# Patient Record
Sex: MALE | Race: WHITE | NOT HISPANIC OR LATINO | Employment: OTHER | URBAN - NONMETROPOLITAN AREA
[De-identification: names, ages, dates, MRNs, and addresses within clinical notes are randomized per-mention and may not be internally consistent; named-entity substitution may affect disease eponyms.]

---

## 2017-01-25 ENCOUNTER — OFFICE VISIT (OUTPATIENT)
Dept: NEUROSURGERY | Facility: CLINIC | Age: 72
End: 2017-01-25

## 2017-01-25 VITALS
WEIGHT: 162 LBS | BODY MASS INDEX: 25.43 KG/M2 | DIASTOLIC BLOOD PRESSURE: 78 MMHG | HEIGHT: 67 IN | SYSTOLIC BLOOD PRESSURE: 130 MMHG

## 2017-01-25 DIAGNOSIS — G89.4 CHRONIC PAIN SYNDROME: Primary | ICD-10-CM

## 2017-01-25 DIAGNOSIS — F17.200 SMOKER: ICD-10-CM

## 2017-01-25 PROBLEM — S22.009A THORACIC SPINE FRACTURE (HCC): Chronic | Status: ACTIVE | Noted: 2017-01-25

## 2017-01-25 PROCEDURE — 99212 OFFICE O/P EST SF 10 MIN: CPT | Performed by: NEUROLOGICAL SURGERY

## 2017-01-25 RX ORDER — AZELASTINE 1 MG/ML
1 SPRAY, METERED NASAL DAILY
COMMUNITY
End: 2022-01-01 | Stop reason: HOSPADM

## 2017-01-25 RX ORDER — TRAMADOL HYDROCHLORIDE 50 MG/1
50 TABLET ORAL 3 TIMES DAILY PRN
COMMUNITY
End: 2017-01-25 | Stop reason: SDUPTHER

## 2017-01-25 RX ORDER — TRAMADOL HYDROCHLORIDE 50 MG/1
50 TABLET ORAL 3 TIMES DAILY PRN
Qty: 90 TABLET | Refills: 0 | Status: SHIPPED | OUTPATIENT
Start: 2017-01-25

## 2017-01-25 RX ORDER — MONTELUKAST SODIUM 10 MG/1
10 TABLET ORAL NIGHTLY
COMMUNITY

## 2017-01-25 RX ORDER — OMEPRAZOLE 20 MG/1
20 CAPSULE, DELAYED RELEASE ORAL 2 TIMES DAILY
COMMUNITY

## 2017-01-25 RX ORDER — METAXALONE 800 MG/1
800 TABLET ORAL 2 TIMES DAILY PRN
Qty: 60 TABLET | Refills: 0 | Status: SHIPPED | OUTPATIENT
Start: 2017-01-25

## 2017-01-25 RX ORDER — METAXALONE 800 MG/1
800 TABLET ORAL 2 TIMES DAILY PRN
COMMUNITY
End: 2017-01-25 | Stop reason: SDUPTHER

## 2017-01-25 RX ORDER — SIMVASTATIN 40 MG
40 TABLET ORAL NIGHTLY
Status: ON HOLD | COMMUNITY
End: 2021-01-01

## 2017-01-25 RX ORDER — TRIAMCINOLONE ACETONIDE 0.1 %
PASTE (GRAM) DENTAL
Refills: 0 | Status: ON HOLD | COMMUNITY
Start: 2016-10-20 | End: 2021-01-01

## 2017-01-25 RX ORDER — NITROGLYCERIN 0.4 MG/1
TABLET SUBLINGUAL
Refills: 5 | COMMUNITY
Start: 2016-12-14

## 2017-01-25 RX ORDER — CILOSTAZOL 100 MG/1
100 TABLET ORAL 2 TIMES DAILY
Refills: 1 | COMMUNITY
Start: 2016-11-07 | End: 2022-01-01 | Stop reason: HOSPADM

## 2017-01-25 RX ORDER — METOPROLOL SUCCINATE 25 MG/1
25 TABLET, EXTENDED RELEASE ORAL DAILY
Status: ON HOLD | COMMUNITY
End: 2021-01-01

## 2017-01-25 RX ORDER — FLUOROURACIL 50 MG/G
CREAM TOPICAL
Refills: 0 | Status: ON HOLD | COMMUNITY
Start: 2017-01-19 | End: 2021-01-01

## 2017-01-25 NOTE — LETTER
January 25, 2017     KARLA Bell  [address] [place] [address]    Patient: Chucky Jamison   YOB: 1945   Date of Visit: 1/25/2017       Dear KARLA Dill:    Chucky Jamison was in my office today. Below is a copy of my note.    If you have questions, please do not hesitate to call me. I look forward to following Chucky along with you.         Sincerely,        Baldev Phipps MD        CC: Jaswinder Curry III, MD    Patient ID: Chucky Jamison is a 71 y.o. 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  He has had chronic pain.  He had a thoracic fracture.  He states he has reached a settlement.  He has continued chronic pain.  He intermittently takes Ultram and Robaxin.  He has had no new injury.  He gets some numbness in his feet which is vascular in nature.  He has had a lesion in his left long followed by his physician in [facility].  Probably calcification.  An attempted ability to bend.  Standard sit long periods of time due to the interscapular back pain related to old thoracic fracture    The following portions of the patient's history were reviewed and updated as appropriate: allergies, current medications, past family history, past medical history, past social history, past surgical history and problem list.    Review of Systems   Constitutional: Negative.    HENT: Negative.    Eyes: Negative.    Respiratory: Negative.         C OPD, uses inhalers   Cardiovascular: Negative.         Coronary artery disease with previous stents   Gastrointestinal: Negative.    Endocrine: Negative.    Genitourinary: Negative.    Musculoskeletal: Positive for arthralgias and back pain.   Skin: Negative.    Allergic/Immunologic: Negative.    Neurological: Positive for numbness.   Hematological: Negative.    Psychiatric/Behavioral: Negative.    All other systems  reviewed and are negative.      No past medical history on file.    No family history on file.    Social History   Substance Use Topics   • Smoking status: Current Some Day Smoker     Packs/day: 0.50   • Smokeless tobacco: None      Comment: Trying to cut back   • Alcohol use No       No past surgical history on file.    Azithromycin; Penicillins; and Shellfish-derived products      Physical Exam:  []  Awake alert oriented properly.  Speech normal gait normal with limitation forward flexion and with straightening of  thoracolumbar area.  Chest clear and heart regular without murmur or bruit.  Cranial nerves intact good equal strength upper and lower extremities.  Reflexes 1-2+ and equal.  Cerebellar function intact.  No specific sensory abnormality.    Review of Radiographic Studies:  [] No recent x-rays    Medical Decision Making:  []  He may be seen on an as necessary basis.  His primary care physician may monitor his chronic pain this point.    No diagnosis found. closed thoracic spine fracture,  chronic pain    No Follow-up on file.  prn

## 2017-01-25 NOTE — PROGRESS NOTES
Patient ID: Chucky Jamison is a 71 y.o. 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  He has had chronic pain.  He had a thoracic fracture.  He states he has reached a settlement.  He has continued chronic pain.  He intermittently takes Ultram and Robaxin.  He has had no new injury.  He gets some numbness in his feet which is vascular in nature.  He has had a lesion in his left long followed by his physician in [facility].  Probably calcification.  An attempted ability to bend.  Standard sit long periods of time due to the interscapular back pain related to old thoracic fracture    The following portions of the patient's history were reviewed and updated as appropriate: allergies, current medications, past family history, past medical history, past social history, past surgical history and problem list.    Review of Systems   Constitutional: Negative.    HENT: Negative.    Eyes: Negative.    Respiratory: Negative.         C OPD, uses inhalers   Cardiovascular: Negative.         Coronary artery disease with previous stents   Gastrointestinal: Negative.    Endocrine: Negative.    Genitourinary: Negative.    Musculoskeletal: Positive for arthralgias and back pain.   Skin: Negative.    Allergic/Immunologic: Negative.    Neurological: Positive for numbness.   Hematological: Negative.    Psychiatric/Behavioral: Negative.    All other systems reviewed and are negative.      No past medical history on file.    No family history on file.    Social History   Substance Use Topics   • Smoking status: Current Some Day Smoker     Packs/day: 0.50   • Smokeless tobacco: None      Comment: Trying to cut back   • Alcohol use No       No past surgical history on file.    Azithromycin; Penicillins; and Shellfish-derived products      Physical Exam:  []  Awake alert oriented properly.  Speech normal gait normal with  limitation forward flexion and with straightening of  thoracolumbar area.  Chest clear and heart regular without murmur or bruit.  Cranial nerves intact good equal strength upper and lower extremities.  Reflexes 1-2+ and equal.  Cerebellar function intact.  No specific sensory abnormality.    Review of Radiographic Studies:  [] No recent x-rays    Medical Decision Making:  []  He may be seen on an as necessary basis.  His primary care physician may monitor his chronic pain this point.    No diagnosis found. closed thoracic spine fracture,  chronic pain    No Follow-up on file.  prn

## 2017-01-25 NOTE — MR AVS SNAPSHOT
Chucky Jamison   1/25/2017 10:00 AM   Office Visit    Dept Phone:  [phone]   Encounter #:  [id number]    Provider:  Baldev Phipps MD   Department:  [facility] NEUROSURGERY                Your Full Care Plan              Where to Get Your Medications      You can get these medications from any pharmacy     Bring a paper prescription for each of these medications     metaxalone 800 MG tablet    traMADol 50 MG tablet            Your Updated Medication List          This list is accurate as of: 1/25/17 10:23 AM.  Always use your most recent med list.                aspirin 81 MG tablet       azelastine 0.1 % nasal spray   Commonly known as:  ASTELIN       BREO ELLIPTA 200-25 MCG/INH aerosol powder    Generic drug:  Fluticasone Furoate-Vilanterol       cilostazol 100 MG tablet   Commonly known as:  PLETAL       fluorouracil 5 % cream   Commonly known as:  EFUDEX       metaxalone 800 MG tablet   Commonly known as:  SKELAXIN   Take 1 tablet by mouth 2 (Two) Times a Day As Needed for muscle spasms.       metoprolol succinate XL 25 MG 24 hr tablet   Commonly known as:  TOPROL-XL       metoprolol tartrate 25 MG tablet   Commonly known as:  LOPRESSOR       montelukast 10 MG tablet   Commonly known as:  SINGULAIR       nitroglycerin 0.4 MG SL tablet   Commonly known as:  NITROSTAT       omeprazole 20 MG capsule   Commonly known as:  priLOSEC       PULMICORT FLEXHALER IN       simvastatin 40 MG tablet   Commonly known as:  ZOCOR       traMADol 50 MG tablet   Commonly known as:  ULTRAM   Take 1 tablet by mouth 3 (Three) Times a Day As Needed for moderate pain (4-6).       triamcinolone 0.1 % paste   Commonly known as:  KENALOG       Vitamin B12 1000 MCG tablet controlled-release       Vitamin D3 2000 UNITS capsule               You Were Diagnosed With        Codes Comments    Chronic pain syndrome    -  Primary ICD-10-CM: G89.4  ICD-9-CM: 338.4     Smoker     ICD-10-CM:  "F17.200  ICD-9-CM: 305.1       Instructions     None    Patient Instructions History      Upcoming Appointments     Visit Type Date Time Department    FOLLOW UP 1/25/2017 10:00 AM [facility]      MyChart Signup     Our records indicate that you have declined [facility] MyChart signup. If you would like to sign up for [facility], please email [email or web address] or call [phone] to obtain an activation code.             Other Info from Your Visit           Allergies     Azithromycin      abd pain.     Penicillins  Rash    And pain    Shellfish-derived Products  Itching, Rash      Reason for Visit     Back Pain Chronic      Vital Signs     Blood Pressure Height Weight Body Mass Index Smoking Status       130/78 67\" (170.2 cm) 162 lb (73.5 kg) 25.37 kg/m2 Current Some Day Smoker       Problems and Diagnoses Noted     Chronic pain syndrome    -  Primary    Smoker            "

## 2017-03-07 ENCOUNTER — TELEPHONE (OUTPATIENT)
Dept: NEUROSURGERY | Facility: CLINIC | Age: 72
End: 2017-03-07

## 2017-03-07 NOTE — TELEPHONE ENCOUNTER
Patient called requesting a letter for his W/C rep Price Benitez regarding release to PCP for medical care.  Mr. Jamison worked for [employer], this was a W/C claim. [employer].  Chucky called Mr. Benitez and was told they would need a letter to be relased to his PCP. Phone#[phone] Ext [phone] Fax # [phone].

## 2017-06-01 ENCOUNTER — OFFICE VISIT (OUTPATIENT)
Dept: CARDIOLOGY | Age: 72
End: 2017-06-01
Payer: COMMERCIAL

## 2017-06-01 VITALS
HEART RATE: 74 BPM | BODY MASS INDEX: 24.64 KG/M2 | WEIGHT: 157 LBS | HEIGHT: 67 IN | SYSTOLIC BLOOD PRESSURE: 108 MMHG | DIASTOLIC BLOOD PRESSURE: 72 MMHG

## 2017-06-01 DIAGNOSIS — Z72.0 TOBACCO ABUSE: ICD-10-CM

## 2017-06-01 DIAGNOSIS — I25.10 CORONARY ARTERY DISEASE INVOLVING NATIVE CORONARY ARTERY OF NATIVE HEART WITHOUT ANGINA PECTORIS: Primary | ICD-10-CM

## 2017-06-01 DIAGNOSIS — E78.2 MIXED HYPERLIPIDEMIA: ICD-10-CM

## 2017-06-01 DIAGNOSIS — I73.9 PVD (PERIPHERAL VASCULAR DISEASE) (HCC): ICD-10-CM

## 2017-06-01 PROCEDURE — 99406 BEHAV CHNG SMOKING 3-10 MIN: CPT | Performed by: CLINICAL NURSE SPECIALIST

## 2017-06-01 PROCEDURE — 99213 OFFICE O/P EST LOW 20 MIN: CPT | Performed by: CLINICAL NURSE SPECIALIST

## 2017-06-01 RX ORDER — ROSUVASTATIN CALCIUM 10 MG/1
10 TABLET, COATED ORAL DAILY
COMMUNITY
End: 2018-06-13 | Stop reason: ALTCHOICE

## 2017-06-01 ASSESSMENT — ENCOUNTER SYMPTOMS
ABDOMINAL PAIN: 0
NAUSEA: 0
COUGH: 0
ORTHOPNEA: 0
BLURRED VISION: 0
HEARTBURN: 0
VOMITING: 0
CHEST TIGHTNESS: 0
SHORTNESS OF BREATH: 1

## 2017-06-06 ENCOUNTER — TELEPHONE (OUTPATIENT)
Dept: NEUROSURGERY | Facility: CLINIC | Age: 72
End: 2017-06-06

## 2017-06-06 NOTE — TELEPHONE ENCOUNTER
Chucky called wanting to know if Dr. Phipps knew of someone he could be referred to.  His PCP does not take W/C.  Advised Dr. Phipps would be happy to refer him to anyone he would like. The doctors here does not see patient's long term if surgery is not needed.  Chucky will check with his union to see if they could recommend someone.

## 2017-12-01 ENCOUNTER — OFFICE VISIT (OUTPATIENT)
Dept: CARDIOLOGY | Age: 72
End: 2017-12-01
Payer: COMMERCIAL

## 2017-12-01 VITALS
DIASTOLIC BLOOD PRESSURE: 62 MMHG | BODY MASS INDEX: 24.64 KG/M2 | SYSTOLIC BLOOD PRESSURE: 112 MMHG | HEIGHT: 67 IN | WEIGHT: 157 LBS | HEART RATE: 68 BPM

## 2017-12-01 DIAGNOSIS — E78.2 MIXED HYPERLIPIDEMIA: ICD-10-CM

## 2017-12-01 DIAGNOSIS — F17.219 NICOTINE DEPENDENCE, CIGARETTES, W UNSP DISORDERS: ICD-10-CM

## 2017-12-01 DIAGNOSIS — I25.10 CORONARY ARTERY DISEASE INVOLVING NATIVE CORONARY ARTERY OF NATIVE HEART WITHOUT ANGINA PECTORIS: Primary | ICD-10-CM

## 2017-12-01 DIAGNOSIS — I73.9 PVD (PERIPHERAL VASCULAR DISEASE) (HCC): ICD-10-CM

## 2017-12-01 PROCEDURE — 99213 OFFICE O/P EST LOW 20 MIN: CPT | Performed by: CLINICAL NURSE SPECIALIST

## 2017-12-01 PROCEDURE — 93000 ELECTROCARDIOGRAM COMPLETE: CPT | Performed by: CLINICAL NURSE SPECIALIST

## 2017-12-01 PROCEDURE — 99406 BEHAV CHNG SMOKING 3-10 MIN: CPT | Performed by: CLINICAL NURSE SPECIALIST

## 2017-12-01 RX ORDER — NITROGLYCERIN 0.4 MG/1
0.4 TABLET SUBLINGUAL EVERY 5 MIN PRN
Qty: 25 TABLET | Refills: 5 | Status: SHIPPED | OUTPATIENT
Start: 2017-12-01 | End: 2021-12-02 | Stop reason: SDUPTHER

## 2017-12-01 ASSESSMENT — ENCOUNTER SYMPTOMS
NAUSEA: 0
HEARTBURN: 0
VOMITING: 0
BLURRED VISION: 0
ORTHOPNEA: 0
SHORTNESS OF BREATH: 1
COUGH: 0

## 2017-12-01 NOTE — PROGRESS NOTES
[facility], [address], [address] [address]  Phone: [phone]  Fax: [phone]    OFFICE VISIT:  12/1/2017    [address]: 1945    Reason For Visit:  Anais Rider is a 81 y.o. male who is here for 6 Month Follow-Up (pt states no cardiac symptoms at this time ) and Coronary Artery Disease    HPI   Patient is here for follow-up with a history of CAD. His PCI's were done at [address] [place] [address]. He is denying any cardiac symptoms such as chest pain, worsening dyspnea, orthopnea, PND, edema, or palpitations. He is followed by vascular surgery for PVD. He continues to smoke    Lorna Rodriguez NP, APRN is PCP.   Dilan Carrion has the following history as recorded in [facility]:    Patient Active Problem List    Diagnosis Date Noted    Nicotine dependence, cigarettes, w unsp disorders 12/01/2017    CAD (coronary artery disease)     Hyperlipidemia     P[facility]) [facility] [address]     Tobacco abuse      Past Medical History:   Diagnosis Date    CAD (coronary artery disease)     RUFINO to LCX 1/13    Hyperlipidemia     Nicotine dependence, cigarettes, w unsp disorders 12/1/2017    PVD (peripheral vascular disease) ([facility] [address]     Tobacco abuse      Past Surgical History:   Procedure Laterality Date    CARDIAC CATHETERIZATION  02/02/2013    EF 65%, patent stent to LCX, no new occlusive disease    CATARACT REMOVAL WITH IMPLANT Bilateral 2014    CORONARY ANGIOPLASTY  01/28/2013    RUFINO to LCX    SHOULDER SURGERY Left      Family History   Problem Relation Age of Onset    Hypertension Mother     Stroke Mother      Social History   Substance Use Topics    Smoking status: Current Every Day Smoker     Packs/day: 0.50    Smokeless tobacco: Never Used    Alcohol use No      Current Outpatient Prescriptions   Medication Sig Dispense Refill    nitroGLYCERIN (NITROSTAT) 0.4 MG SL tablet Place 1 tablet under the tongue every 5 minutes as needed for Chest pain As directed 25 tablet 5    metoprolol tartrate (LOPRESSOR) 25 MG tablet TAKE 1 TABLET BY MOUTH TWICE A  tablet 3    rosuvastatin (CRESTOR) 10 MG tablet Take 10 mg by mouth daily      omeprazole (PRILOSEC) 20 MG delayed release capsule Take 20 mg by mouth daily      Multiple Minerals-Vitamins (CALCIUM & VIT D3 BONE HEALTH PO) Take by mouth      Cholecalciferol (VITAMIN D3) 2000 UNITS CAPS Take by mouth      vitamin B-12 (CYANOCOBALAMIN) 1000 MCG tablet Take 1,000 mcg by mouth daily      aspirin 81 MG tablet Take 81 mg by mouth 2 times daily      folic acid (FOLVITE) 1 MG tablet Take 1 mg by mouth daily      Budesonide (PULMICORT FLEXHALER IN) Inhale into the lungs      Fluticasone Furoate-Vilanterol (BREO ELLIPTA) 200-25 MCG/INH AEPB Inhale into the lungs      azelastine HCl 0.15 % SOLN by Nasal route      cilostazol (PLETAL) 100 MG tablet Take 100 mg by mouth 2 times daily       No current facility-administered medications for this visit. Allergies: Zithromax [azithromycin]; Penicillins; and Shellfish-derived products    Review of Systems  Review of Systems   Constitutional: Negative for chills, fever and malaise/fatigue. HENT: Negative for nosebleeds. Eyes: Negative for blurred vision. Respiratory: Positive for shortness of breath. Negative for cough. Sputum production: over exertion. Cardiovascular: Negative for chest pain, palpitations, orthopnea, leg swelling and PND. Gastrointestinal: Negative for heartburn, nausea and vomiting. Musculoskeletal: Negative for falls and myalgias. Skin: Negative for rash. Neurological: Negative for dizziness, sensory change, speech change and focal weakness. Endo/Heme/Allergies: Does not bruise/bleed easily. Psychiatric/Behavioral: Negative for depression. The patient is not nervous/anxious.         Objective  Vital Signs - /62   Pulse 68   Ht 5' 7\" (1.702 m)   Wt 157 lb (71.2 kg)   BMI 24.59 kg/m²   Physical Exam Constitutional: He is oriented to person, place, and time. He appears well-developed and well-nourished. No distress. HENT:   Head: Normocephalic and atraumatic. Eyes: Pupils are equal, round, and reactive to light. Right eye exhibits no discharge. Left eye exhibits no discharge. Neck: No JVD present. No tracheal deviation present. Cardiovascular: Normal rate, regular rhythm, normal heart sounds and intact distal pulses. Exam reveals no gallop and no friction rub. No murmur heard. No carotid bruit   Pulmonary/Chest: Effort normal and breath sounds normal. No respiratory distress. He has no wheezes. He has no rales. Abdominal: Soft. There is no tenderness. Musculoskeletal: He exhibits no edema. Normal gait and station   Neurological: He is alert and oriented to person, place, and time. No cranial nerve deficit. Skin: Skin is warm and dry. No rash noted. Psychiatric: He has a normal mood and affect. His behavior is normal. Judgment normal.   Nursing note and vitals reviewed. Assessment:    1. Coronary artery disease involving native coronary artery of native heart without angina pectoris  EKG 12 lead    nitroGLYCERIN (NITROSTAT) 0.4 MG SL tablet   2. PVD (peripheral vascular disease) ([address]     3. Mixed hyperlipidemia     4. Nicotine dependence, cigarettes, w unsp disorders       Patient is taking medications as prescribed  EKG shows normal sinus rhythm at rate 68    CAD-stable without angina  PVD-followed by vascular surgery  Hyperlipidemia-on statin and managed by PCP. Good control per patient report  Nicotine dependence-Instructed patient in smoking cessation rationales, strategies, and available resources x 3 minutes. Smokes 0.5ppd. He will consider not smoking    Stable cardiovascular status. No evidence of overt heart failure, angina or dysrhythmia. Plan    Orders Placed This Encounter   Procedures    EKG 12 lead     Order Specific Question:   Reason for Exam?     Answer:    Other

## 2017-12-01 NOTE — PATIENT INSTRUCTIONS
Followup With Dr. Venessa Gallardo 6mo  Quit smoking. Call the [address] [place] [address] [phone]    Call with any questions or concerns  Follow up with Dominga Byrd NP, APRN for non cardiac problems  Report any new problems  Cardiovascular Fitness-Exercise as tolerated. Strive for 15 minutes of exercise most days of the week. Cardiac / Healthy Diet  Continue current medications as directed  Continue plan of treatment  It is always recommended that you bring your medications bottles with you to each visit - this is for your safety! Patient Education        Stopping Smoking: Care Instructions  Your Care Instructions  Cigarette smokers crave the nicotine in cigarettes. Giving it up is much harder than simply changing a habit. Your body has to stop craving the nicotine. It is hard to quit, but you can do it. There are many tools that people use to quit smoking. You may find that combining tools works best for you. There are several steps to quitting. First you get ready to quit. Then you get support to help you. After that, you learn new skills and behaviors to become a nonsmoker. For many people, a necessary step is getting and using medicine. Your doctor will help you set up the plan that best meets your needs. You may want to attend a smoking cessation program to help you quit smoking. When you choose a program, look for one that has proven success. Ask your doctor for ideas. You will greatly increase your chances of success if you take medicine as well as get counseling or join a cessation program.  Some of the changes you feel when you first quit tobacco are uncomfortable. Your body will miss the nicotine at first, and you may feel short-tempered and grumpy. You may have trouble sleeping or concentrating. Medicine can help you deal with these symptoms. You may struggle with changing your smoking habits and rituals. The last step is the tricky one: Be prepared for the smoking urge to continue for a time. inhaler  · Ask your doctor about bupropion (Wellbutrin) or varenicline (Chantix), which are prescription medicines. They do not contain nicotine. They help you by reducing withdrawal symptoms, such as stress and anxiety. · Some people find hypnosis, acupuncture, and massage helpful for ending the smoking habit. · Eat a healthy diet and get regular exercise. Having healthy habits will help your body move past its craving for nicotine. · Be prepared to keep trying. Most people are not successful the first few times they try to quit. Do not get mad at yourself if you smoke again. Make a list of things you learned and think about when you want to try again, such as next week, next month, or next year. Where can you learn more? Go to https://[employer]. org and sign in to your [employer] account. Enter I106 in the [employer] box to learn more about \"Stopping Smoking: Care Instructions. \"     If you do not have an account, please click on the \"Sign Up Now\" link. Current as of: March 20, 2017  Content Version: 11.3  © 6701-7975 [employer]. Care instructions adapted under license by [facility] ([facility]). If you have questions about a medical condition or this instruction, always ask your healthcare professional. [address] any warranty or liability for your use of this information.

## 2018-06-13 ENCOUNTER — OFFICE VISIT (OUTPATIENT)
Dept: CARDIOLOGY | Age: 73
End: 2018-06-13
Payer: COMMERCIAL

## 2018-06-13 VITALS
HEIGHT: 67 IN | DIASTOLIC BLOOD PRESSURE: 76 MMHG | BODY MASS INDEX: 25.11 KG/M2 | SYSTOLIC BLOOD PRESSURE: 124 MMHG | WEIGHT: 160 LBS | HEART RATE: 65 BPM | RESPIRATION RATE: 16 BRPM

## 2018-06-13 DIAGNOSIS — I10 ESSENTIAL HYPERTENSION: ICD-10-CM

## 2018-06-13 DIAGNOSIS — I25.10 ARTERIOSCLEROTIC HEART DISEASE: Primary | ICD-10-CM

## 2018-06-13 PROCEDURE — 99213 OFFICE O/P EST LOW 20 MIN: CPT | Performed by: INTERNAL MEDICINE

## 2018-06-13 RX ORDER — METAXALONE 800 MG/1
800 TABLET ORAL
COMMUNITY
Start: 2017-01-25

## 2018-06-13 RX ORDER — SIMVASTATIN 40 MG
40 TABLET ORAL
COMMUNITY
End: 2019-06-10 | Stop reason: ALTCHOICE

## 2018-06-13 RX ORDER — TRAMADOL HYDROCHLORIDE 50 MG/1
50 TABLET ORAL
COMMUNITY
Start: 2017-01-25

## 2018-12-05 ENCOUNTER — OFFICE VISIT (OUTPATIENT)
Dept: CARDIOLOGY | Age: 73
End: 2018-12-05
Payer: COMMERCIAL

## 2018-12-05 VITALS
DIASTOLIC BLOOD PRESSURE: 60 MMHG | HEART RATE: 62 BPM | WEIGHT: 158 LBS | SYSTOLIC BLOOD PRESSURE: 118 MMHG | BODY MASS INDEX: 24.8 KG/M2 | HEIGHT: 67 IN

## 2018-12-05 DIAGNOSIS — I25.10 CORONARY ARTERY DISEASE INVOLVING NATIVE CORONARY ARTERY OF NATIVE HEART WITHOUT ANGINA PECTORIS: Primary | ICD-10-CM

## 2018-12-05 DIAGNOSIS — E78.2 MIXED HYPERLIPIDEMIA: ICD-10-CM

## 2018-12-05 DIAGNOSIS — I10 ESSENTIAL HYPERTENSION: ICD-10-CM

## 2018-12-05 PROCEDURE — 93000 ELECTROCARDIOGRAM COMPLETE: CPT | Performed by: NURSE PRACTITIONER

## 2018-12-05 PROCEDURE — 99214 OFFICE O/P EST MOD 30 MIN: CPT | Performed by: NURSE PRACTITIONER

## 2018-12-05 RX ORDER — NITROGLYCERIN 0.4 MG/1
0.4 TABLET SUBLINGUAL EVERY 5 MIN PRN
Qty: 25 TABLET | Refills: 3 | Status: SHIPPED | OUTPATIENT
Start: 2018-12-05 | End: 2019-06-10 | Stop reason: SDUPTHER

## 2018-12-05 NOTE — PATIENT INSTRUCTIONS
Continue current medications as prescribed. Continue to follow up with primary care provider for non cardiac medical problems. Call the office with any problems, questions or concerns at [phone]. Follow up as scheduled with your cardiologist- Dr. Ever Sawyer 6 months. The following educational material has been included in this after visit summary for your review: Life simple 7. Heart health. Smoking cessation.     Additional instructions:  Coronary artery disease risk factors you can control: Smoking, high blood pressure, high cholesterol, diabetes, being overweight, lack of exercise and stress. Continue heart healthy diet. Take medications as directed. Exercise as tolerated. Strive for 15 minutes of exercise most days of the week. If asked to keep a blood pressure log, do so for 2 weeks. Call the office to report readings at [phone]. Blood pressure goal  is less than 120/70. Elevated blood pressure at 120-129/80 or less. High blood pressure at 130-139/80-89. If you are taking cholesterol lowering medications, it is recommended that lab work be checked annually. Always keep a current medication list. Bring your medications to every office visit. Life simple 7  1) Manage blood pressure - high blood pressure is a major risk factor for heart disease and stroke. Keeping blood pressure in health range reduces strain on your heart, arteries and kidneys. 2) Control cholesterol - contributes to plaque, which can clog arteries and lead to heart disease and stroke. When you control your cholesterol you are giving your arteries their best chance to remain clear. 3) Reduce blood sugar - most of the food we eat is turning into glucose or blood sugar that our body uses for energy. Over time, high levels of blood sugar can damage your heart, kidneys, eyes and nerves. 4) Get active - living an active life is one of the most rewarding gifts you can give yourself and those you love.   Simply put, daily physical activity increases your length and quality of life. 5)  Eat better - A healthy diet is one of your best weapons for fighting cardiovascular disease. When you eat a heart healthy diet, you improve your chances for feeling good and staying healthy for life. 6)  Lose weight - when you shed extra fat an unnecessary pounds, you reduce the burden on your hear, lungs, blood vessels and skeleton. You give yourself the gift of active living, you lower your blood pressure and help yourself feel better. 7) Stop smoking - cigarette smokers have a higher risk of developing cardiovascular disease. If  You smoke, quitting is the best thing you can do for your health. Check American Heart Association on line for more information on Life's Simple 7 and tips for healthy living.     How to take:  NITROGLYCERIN (Nitrostat) 0.4 mg tablets, sublingual.  Nitroglycerin is in a group of drugs called nitrates. Nitroglycerin dilates (widens) blood vessels, making it easier for blood to flow through them and easier for the heart to pump. Dosing Guidelines for Nitroglycerin Tablets  · At the start of an angina (chest pain) attack, place one tablet under the tongue or between the cheek and gum. Do not swallow or chew the tablet; let it dissolve on its own. If necessary, a second and third tablet may be used, with five minutes between using each tablet. If you use a third tablet and your chest pain continues, it is time to seek immediate medical attention. Call 911 immediately and have someone drive you to the emergency room. You may be having a heart attack or other serious heart problem. · To prevent angina from exercise or stress, use 1 tablet 5 to 10 minutes before the activity. Patient Education        A Healthy Heart: Care Instructions  Your Care Instructions    Heart disease occurs when a substance called plaque builds up in the vessels that supply oxygen-rich blood to your heart.  This can narrow the blood problems. Where can you learn more? Go to [email or web address] org and sign in to your [employer] account. Enter U672 in the [facility] box to learn more about \"A Healthy Heart: Care Instructions. \"     If you do not have an account, please click on the \"Sign Up Now\" link. Current as of: December 6, 2017  Content Version: 11.8  © 2272-5253 Healthwise, [employer]. Care instructions adapted under license by [facility] ([facility]). If you have questions about a medical condition or this instruction, always ask your healthcare professional. [place] [address] any warranty or liability for your use of this information. QUIT NOW [place] SMOKING CESSATION PROGRAM    How Do I Get Started  Quitting tobacco is a process. The first step is the hardest. When you are ready to quit, [employer] [place] can help you with each step in the quitting process. The Program  Quit Now [place] is a FREE online service available to [place] residents 13years of age and over. When you become a member, you get special tools, a support team of coaches, research-based information, and a community of others trying to become tobacco free. Our expert coaches can talk to you about overcoming common barriers, such as dealing with stress, fighting cravings, coping with irritability, and controlling weight gain. We also offer a free telephone service, so you can speak to a  in person, if you would prefer. Call the [facility] or [phone]. What if I don't qualify for the Program?  You must be 13years of age or older to participate in the program. It is also helpful to discuss quitting with your doctor. How do I enroll in the Quit Now [place] online program?  Complete the brief Enroll Now form. If you are a [place] resident over 13years of age, you will receive an email letting you know that you are enrolled. You can use the online service as often as you want!    How do I enroll in both the online and telephone program?  Complete the brief Enroll Now form. If you qualify, you will receive an email letting you know that you are enrolled. You can use the online service as often as you want! While completing the Enroll Now form you indicate the best time for a  to give you a call. If you would like, you can call the QuitLine at [phone]-QUITNOW. We're here 7 days a week. Monday - Sunday 7 a.m. - 12 a.m. CST  Monday - Sunday 8 a.m. - 1 a.m. EST  If you call when we are closed, please leave a message and we will call you back. Tips to Help You Stop Smoking   Cigarette smoking is a preventable cause of death in the [place]. If you have thought about quitting but haven't been able to, here are some reasons why you should and some ways to do it. Here's Why   Quitting smoking now can decrease your risk of getting smoking-related illnesses like:   Heart disease, Stroke,  Cataracts, Macular degeneration, Thyroid conditions, Hearing loss, Erectile dysfunction, Dementia, Osteoporosis. Several types of cancer, including:   Lung, Mouth, Esophagus, Larynx, Bladder, Pancreas, Kidney   Chronic lung diseases:   Bronchitis, Emphysema, Asthma   Here's How   Once you've decided to quit smoking, set your target quit date a few weeks away. In the time leading up to your quit day, try some of these ideas offered by the [address] to help you successfully quit smoking. For the best results, work with your doctor. Together, you can test your lung function and compare the results to those of a nonsmoking person. The results can be given to you as your lung age. Finding out your lung age right after having the test done may help you to stop smoking. Your doctor can also discuss with you all of your options and refer you to smoking-cessation support groups.  You may wish to use nicotine replacement (gum, patches, inhaler)

## 2018-12-05 NOTE — PROGRESS NOTES
significant hearing loss. Eyes - no sudden vision change or amaurosis. No corneal arcus, xantholasma, subconjunctival hemorrhage or discharge. Respiratory - no significant wheezing, stridor, apnea or cough. No dyspnea on exertion or shortness of air. Cardiovascular - no exertional chest pain to suggest myocardial ischemia. No orthopnea or PND. No sensation of sustained arrythmia. No occurrence of slow heart rate. No palpitations. No claudication. No leg edema. Gastrointestinal - no abdominal swelling or pain. No blood in stool. No severe constipation, diarrhea, nausea, or vomiting. Genitourinary - no dysuria, frequency, or urgency. No flank pain or hematuria. Musculoskeletal - no back pain or myalgia. No problems with gait. Extremities - no clubbing, cyanosis or edema. Skin - no color change or rash. No pallor. No new surgical incision. Neurologic - no speech difficulty, facial asymmetry or lateralizing weakness. No seizures, presyncope or syncope. No significant dizziness. Hematologic - no easy bruising or excessive bleeding. Psychiatric - no severe anxiety or insomnia. No confusion. All other review of systems are negative. Objective  Vital Signs - /60   Pulse 62   Ht 5' 7\" (1.702 m)   Wt 158 lb (71.7 kg)   BMI 24.75 kg/m²   General - Soraida Ortez is alert, cooperative, and pleasant. Well groomed. No acute distress. Body habitus - Body mass index is 24.75 kg/m². HEENT - Head is normocephalic. No circumoral cyanosis. Dentition is normal.  EYES -   Lids normal without ptosis. No discharge, edema or subconjunctival hemorrhage. Neck - Symmetrical without apparent mass or lymphadenopathy. Respiratory - Normal respiratory effort without use of accessory muscles. Ausculatation reveals vesicular breath sounds without crackles, wheezes, rub or rhonchi. Cardiovascular - No jugular venous distention. Auscultation reveals regular rate and rhythm.   No audible clicks, gallop or rub. No murmur. No lower extremity varicosities. No carotid bruits. Abdominal -  No visible distention, mass or pulsations. Extremities - No clubbing or cyanosis. No statis dermatitis or ulcers. No edema. Musculoskeletal -   No Osler's nodes. No kyphosis or scoliosis. Gait is even and regular without limp or shuffle. Ambulates without assistance. Skin -  Warm and dry; no rash or pallor. No new surgical wound. Neurological - No focal neurological deficits. Thought processes coherent. No apparent tremor. Oriented to person, place and time. Psychiatric -  Appropriate affect and mood. Assessment:     Diagnosis Orders   1. Coronary artery disease involving native coronary artery of native heart without angina pectoris     2. Essential hypertension     3. Mixed hyperlipidemia       EKG reviewed:  NSR 61 BPM; no acute ischemic changes or ectopy. Stable CV status without symptoms of overt heart failure, arrhythmia or angina. CAD medical management includes Lopressor, Zocor and ASA. BP well controlled. PCP follows lipids - tolerating Zocor well. Patient continues to smoke but reports cutting back. Contact for [address] provided as well as tips to help stop smoking handout. Patient is compliant with medication regimen. BP Readings from Last 3 Encounters:   12/05/18 118/60   06/13/18 124/76   12/01/17 112/62    Pulse Readings from Last 3 Encounters:   12/05/18 62   06/13/18 65   12/01/17 68        Wt Readings from Last 3 Encounters:   12/05/18 158 lb (71.7 kg)   06/13/18 160 lb (72.6 kg)   12/01/17 157 lb (71.2 kg)     Plan  Previous cardiac history and records reviewed. Continue current medications as prescribed. Continue to follow up with primary care provider for non cardiac medical problems. Call the office with any problems, questions or concerns at [phone]. Follow up as scheduled with your cardiologist- Dr. Verena Beckwith 6 months.   The following educational material has been included in this after visit summary for your review: Life simple 7. Heart health. Smoking cessation.     Additional instructions:  Coronary artery disease risk factors you can control: Smoking, high blood pressure, high cholesterol, diabetes, being overweight, lack of exercise and stress. Continue heart healthy diet. Take medications as directed. Exercise as tolerated. Strive for 15 minutes of exercise most days of the week. If asked to keep a blood pressure log, do so for 2 weeks. Call the office to report readings at [phone]. Blood pressure goal  is less than 120/70. Elevated blood pressure at 120-129/80 or less. High blood pressure at 130-139/80-89. If you are taking cholesterol lowering medications, it is recommended that lab work be checked annually. Always keep a current medication list. Bring your medications to every office visit. Life simple 7  1) Manage blood pressure - high blood pressure is a major risk factor for heart disease and stroke. Keeping blood pressure in health range reduces strain on your heart, arteries and kidneys. 2) Control cholesterol - contributes to plaque, which can clog arteries and lead to heart disease and stroke. When you control your cholesterol you are giving your arteries their best chance to remain clear. 3) Reduce blood sugar - most of the food we eat is turning into glucose or blood sugar that our body uses for energy. Over time, high levels of blood sugar can damage your heart, kidneys, eyes and nerves. 4) Get active - living an active life is one of the most rewarding gifts you can give yourself and those you love. Simply put, daily physical activity increases your length and quality of life. 5)  Eat better - A healthy diet is one of your best weapons for fighting cardiovascular disease. When you eat a heart healthy diet, you improve your chances for feeling good and staying healthy for life.   6)  Lose weight - when you shed extra fat an

## 2019-06-10 ENCOUNTER — OFFICE VISIT (OUTPATIENT)
Dept: CARDIOLOGY | Age: 74
End: 2019-06-10
Payer: COMMERCIAL

## 2019-06-10 VITALS
WEIGHT: 152 LBS | BODY MASS INDEX: 23.86 KG/M2 | HEIGHT: 67 IN | DIASTOLIC BLOOD PRESSURE: 80 MMHG | SYSTOLIC BLOOD PRESSURE: 128 MMHG | HEART RATE: 73 BPM

## 2019-06-10 DIAGNOSIS — I25.10 CORONARY ARTERY DISEASE INVOLVING NATIVE CORONARY ARTERY OF NATIVE HEART WITHOUT ANGINA PECTORIS: Primary | ICD-10-CM

## 2019-06-10 DIAGNOSIS — Z72.0 TOBACCO ABUSE: ICD-10-CM

## 2019-06-10 DIAGNOSIS — Z95.5 H/O HEART ARTERY STENT: ICD-10-CM

## 2019-06-10 DIAGNOSIS — I10 ESSENTIAL HYPERTENSION: ICD-10-CM

## 2019-06-10 DIAGNOSIS — E78.2 MIXED HYPERLIPIDEMIA: ICD-10-CM

## 2019-06-10 DIAGNOSIS — R06.09 DYSPNEA ON EFFORT: ICD-10-CM

## 2019-06-10 PROCEDURE — 99214 OFFICE O/P EST MOD 30 MIN: CPT | Performed by: INTERNAL MEDICINE

## 2019-06-10 PROCEDURE — 93000 ELECTROCARDIOGRAM COMPLETE: CPT | Performed by: INTERNAL MEDICINE

## 2019-06-10 RX ORDER — IBUPROFEN 200 MG
1 CAPSULE ORAL DAILY
COMMUNITY
End: 2022-08-29

## 2019-06-10 ASSESSMENT — ENCOUNTER SYMPTOMS
GASTROINTESTINAL NEGATIVE: 1
VOMITING: 0
DIARRHEA: 0
SHORTNESS OF BREATH: 0
RESPIRATORY NEGATIVE: 1
NAUSEA: 0
EYES NEGATIVE: 1

## 2019-06-10 NOTE — PROGRESS NOTES
[facility]                            Date:  6/10/2019  Patient: Gustavo Marx  Age:  72 y.o., 1945      Reason for evaluation:         SUBJECTIVE:  Seen today follow-up assessment. Underwent previous stent placement January 2013. No recent testing. Scheduled for prostate procedure 7/29/2019. Continues to smoke 1/2 pack/day. Has some moderate exertional dyspnea denies anginal chest pain. He has not taken any nitroglycerin in the past 12 months. No other complaints. Review of Systems   Constitutional: Negative. Negative for chills, fever and unexpected weight change. HENT: Negative. Eyes: Negative. Respiratory: Negative. Negative for shortness of breath. Cardiovascular: Negative. Negative for chest pain. Gastrointestinal: Negative. Negative for diarrhea, nausea and vomiting. Endocrine: Negative. Genitourinary: Negative. Musculoskeletal: Negative. Skin: Negative. Neurological: Negative. All other systems reviewed and are negative. OBJECTIVE:     /80   Pulse 73   Ht 5' 7\" (1.702 m)   Wt 152 lb (68.9 kg)   BMI 23.81 kg/m²     Labs:   CBC: No results for input(s): WBC, HGB, HCT, PLT in the last 72 hours. BMP:No results for input(s): NA, K, CO2, BUN, CREATININE, LABGLOM, GLUCOSE in the last 72 hours. BNP: No results for input(s): BNP in the last 72 hours. PT/INR: No results for input(s): PROTIME, INR in the last 72 hours. APTT:No results for input(s): APTT in the last 72 hours. CARDIAC ENZYMES:No results for input(s): CKTOTAL, CKMB, CKMBINDEX, TROPONINI in the last 72 hours. FASTING LIPID PANEL:No results found for: HDL, LDLDIRECT, LDLCALC, TRIG  LIVER PROFILE:No results for input(s): AST, ALT, LABALBU in the last 72 hours.         Past Medical History:   Diagnosis Date    CAD (coronary artery disease)     RUFINO to LCX 1/13    Hyperlipidemia     Nicotine dependence, cigarettes, w unsp disorders 12/1/2017    PVD [facility]peripheral vascular disease) ([facility] [address]     Tobacco abuse      Past Surgical History:   Procedure Laterality Date    CARDIAC CATHETERIZATION  02/02/2013    EF 65%, patent stent to LCX, no new occlusive disease    CATARACT REMOVAL WITH IMPLANT Bilateral 2014    CORONARY ANGIOPLASTY  01/28/2013    RUFINO to LCX    SHOULDER SURGERY Left      Family History   Problem Relation Age of Onset    Hypertension Mother     Stroke Mother      Allergies   Allergen Reactions    Zithromax [Azithromycin]      abd pain.  Penicillins Rash     And pain      Shellfish-Derived Products Rash     Current Outpatient Medications   Medication Sig Dispense Refill    rosuvastatin (CRESTOR) 10 MG tablet Take 10 mg by mouth daily      calcium citrate (CALCITRATE) 950 MG tablet Take 1 tablet by mouth daily      metoprolol tartrate (LOPRESSOR) 25 MG tablet TAKE 1 TABLET BY MOUTH TWICE A  tablet 3    metaxalone (SKELAXIN) 800 MG tablet Take 800 mg by mouth      traMADol (ULTRAM) 50 MG tablet Take 50 mg by mouth. Arcadio Aguilar nitroGLYCERIN (NITROSTAT) 0.4 MG SL tablet Place 1 tablet under the tongue every 5 minutes as needed for Chest pain As directed 25 tablet 5    omeprazole (PRILOSEC) 20 MG delayed release capsule Take 20 mg by mouth daily      Multiple Minerals-Vitamins (CALCIUM & VIT D3 BONE HEALTH PO) Take by mouth      Cholecalciferol (VITAMIN D3) 2000 UNITS CAPS Take by mouth      vitamin B-12 (CYANOCOBALAMIN) 1000 MCG tablet Take 1,000 mcg by mouth daily      aspirin 81 MG tablet Take 81 mg by mouth 2 times daily      folic acid (FOLVITE) 1 MG tablet Take 1 mg by mouth daily      Budesonide (PULMICORT FLEXHALER IN) Inhale into the lungs      Fluticasone Furoate-Vilanterol (BREO ELLIPTA) 200-25 MCG/INH AEPB Inhale into the lungs      azelastine HCl 0.15 % SOLN by Nasal route      cilostazol (PLETAL) 100 MG tablet Take 100 mg by mouth 2 times daily       No current facility-administered medications for this visit.

## 2019-06-20 ENCOUNTER — TELEPHONE (OUTPATIENT)
Dept: CARDIOLOGY | Age: 74
End: 2019-06-20

## 2019-06-20 NOTE — TELEPHONE ENCOUNTER
Request for prostatectomy clearance and ASA/Pletal hold. Pt having [facility] 7/9 waiting results. Seen by Dr Meryle Senters 6/10/19.

## 2019-07-09 ENCOUNTER — HOSPITAL ENCOUNTER (OUTPATIENT)
Dept: NUCLEAR MEDICINE | Age: 74
Discharge: HOME | End: 2019-07-11
Payer: MEDICARE

## 2019-07-09 ENCOUNTER — HOSPITAL ENCOUNTER (OUTPATIENT)
Dept: NON INVASIVE DIAGNOSTICS | Age: 74
Discharge: HOME | End: 2019-07-09
Payer: MEDICARE

## 2019-07-09 DIAGNOSIS — I10 ESSENTIAL HYPERTENSION: ICD-10-CM

## 2019-07-09 DIAGNOSIS — I25.10 CORONARY ARTERY DISEASE INVOLVING NATIVE CORONARY ARTERY OF NATIVE HEART WITHOUT ANGINA PECTORIS: ICD-10-CM

## 2019-07-09 DIAGNOSIS — E78.2 MIXED HYPERLIPIDEMIA: ICD-10-CM

## 2019-07-09 DIAGNOSIS — Z72.0 TOBACCO ABUSE: ICD-10-CM

## 2019-07-09 LAB
LV EF: 58 %
LVEF MODALITY: NORMAL

## 2019-07-09 PROCEDURE — A9500 TC99M SESTAMIBI: HCPCS | Performed by: INTERNAL MEDICINE

## 2019-07-09 PROCEDURE — 6360000002 HC RX W HCPCS: Performed by: INTERNAL MEDICINE

## 2019-07-09 PROCEDURE — 78452 HT MUSCLE IMAGE SPECT MULT: CPT

## 2019-07-09 PROCEDURE — 93306 TTE W/DOPPLER COMPLETE: CPT

## 2019-07-09 PROCEDURE — 93017 CV STRESS TEST TRACING ONLY: CPT

## 2019-07-09 PROCEDURE — 3430000000 HC RX DIAGNOSTIC RADIOPHARMACEUTICAL: Performed by: INTERNAL MEDICINE

## 2019-07-09 RX ADMIN — TETRAKIS(2-METHOXYISOBUTYLISOCYANIDE)COPPER(I) TETRAFLUOROBORATE 10 MILLICURIE: 1 INJECTION, POWDER, LYOPHILIZED, FOR SOLUTION INTRAVENOUS at 16:08

## 2019-07-09 RX ADMIN — REGADENOSON 0.4 MG: 0.08 INJECTION, SOLUTION INTRAVENOUS at 15:35

## 2019-07-09 RX ADMIN — TETRAKIS(2-METHOXYISOBUTYLISOCYANIDE)COPPER(I) TETRAFLUOROBORATE 30 MILLICURIE: 1 INJECTION, POWDER, LYOPHILIZED, FOR SOLUTION INTRAVENOUS at 16:08

## 2019-07-11 LAB
LV EF: 82 %
LVEF MODALITY: NORMAL

## 2019-07-11 NOTE — TELEPHONE ENCOUNTER
Dr. Toni Wilson,  This pt Ivone was done on 7/9/19 for his clearance for Prostatectomy on 7/29/19. It does not appear to have been read yet. Can you please dictate it and let me know if this pt is cleared for his surgery? They are also asking for him to stop ASA and Pletal prior to his surgery. They did not give a length of time.  Unsure reason for Pletal.

## 2019-07-12 NOTE — TELEPHONE ENCOUNTER
Grossly negative Cardiolyte for the presence of ischemia or infarction   with normal wall motion and ejection fraction at rest     Just looked to see if this was resulted yet. I'm not sure if he will advise on the Pletal but I can ask him when he returns.

## 2019-07-15 NOTE — TELEPHONE ENCOUNTER
Dr. Brain Mason says he should not have to stop those two medications for this procedure. He did clear for him to have this but advised holding those meds were not necessary.

## 2019-12-19 ENCOUNTER — OFFICE VISIT (OUTPATIENT)
Dept: CARDIOLOGY | Age: 74
End: 2019-12-19
Payer: MEDICARE

## 2019-12-19 VITALS
BODY MASS INDEX: 24.01 KG/M2 | WEIGHT: 153 LBS | DIASTOLIC BLOOD PRESSURE: 72 MMHG | HEIGHT: 67 IN | HEART RATE: 68 BPM | SYSTOLIC BLOOD PRESSURE: 118 MMHG

## 2019-12-19 DIAGNOSIS — I25.10 CORONARY ARTERY DISEASE INVOLVING NATIVE CORONARY ARTERY OF NATIVE HEART WITHOUT ANGINA PECTORIS: Primary | ICD-10-CM

## 2019-12-19 DIAGNOSIS — R06.09 DYSPNEA ON EFFORT: ICD-10-CM

## 2019-12-19 DIAGNOSIS — E78.2 MIXED HYPERLIPIDEMIA: ICD-10-CM

## 2019-12-19 DIAGNOSIS — Z95.5 H/O HEART ARTERY STENT: ICD-10-CM

## 2019-12-19 DIAGNOSIS — I73.9 PVD (PERIPHERAL VASCULAR DISEASE) (HCC): ICD-10-CM

## 2019-12-19 DIAGNOSIS — I10 ESSENTIAL HYPERTENSION: ICD-10-CM

## 2019-12-19 PROCEDURE — 99213 OFFICE O/P EST LOW 20 MIN: CPT | Performed by: INTERNAL MEDICINE

## 2019-12-19 ASSESSMENT — ENCOUNTER SYMPTOMS
SHORTNESS OF BREATH: 0
DIARRHEA: 0
NAUSEA: 0
EYES NEGATIVE: 1
GASTROINTESTINAL NEGATIVE: 1
VOMITING: 0
RESPIRATORY NEGATIVE: 1

## 2020-02-27 ENCOUNTER — OFFICE VISIT (OUTPATIENT)
Dept: CARDIOLOGY | Age: 75
End: 2020-02-27
Payer: MEDICARE

## 2020-02-27 VITALS
HEIGHT: 67 IN | DIASTOLIC BLOOD PRESSURE: 76 MMHG | HEART RATE: 76 BPM | SYSTOLIC BLOOD PRESSURE: 128 MMHG | BODY MASS INDEX: 24.8 KG/M2 | WEIGHT: 158 LBS

## 2020-02-27 PROBLEM — I71.40 ABDOMINAL AORTIC ANEURYSM (AAA): Status: ACTIVE | Noted: 2020-02-27

## 2020-02-27 PROCEDURE — 99406 BEHAV CHNG SMOKING 3-10 MIN: CPT | Performed by: CLINICAL NURSE SPECIALIST

## 2020-02-27 PROCEDURE — 99214 OFFICE O/P EST MOD 30 MIN: CPT | Performed by: CLINICAL NURSE SPECIALIST

## 2020-02-27 RX ORDER — GABAPENTIN 100 MG/1
100 CAPSULE ORAL DAILY
COMMUNITY
End: 2021-12-23 | Stop reason: SDUPTHER

## 2020-02-27 RX ORDER — MONTELUKAST SODIUM 10 MG/1
10 TABLET ORAL NIGHTLY
COMMUNITY

## 2020-02-27 ASSESSMENT — ENCOUNTER SYMPTOMS
NAUSEA: 0
COUGH: 0
WHEEZING: 0
CHEST TIGHTNESS: 0
FACIAL SWELLING: 0
VOMITING: 0
EYE REDNESS: 0
SHORTNESS OF BREATH: 1
ABDOMINAL PAIN: 0

## 2020-02-27 NOTE — PROGRESS NOTES
[facility], [address] [place] [address], [address] [id number]  Phone: [phone]  Fax: [phone]    OFFICE VISIT:  2/27/2020    [address]: 1945    Reason For Visit:  Rachel Nelson is a 69 y.o. male who is here for Follow-up (pt has had SOB) and Coronary Artery Disease       Diagnosis Orders   1. Coronary artery disease involving native coronary artery of native heart without angina pectoris     2. Mixed hyperlipidemia     3. Essential hypertension     4. Tobacco abuse     5. Nicotine dependence, cigarettes, w unsp disorders     6. Abdominal aortic aneurysm (AAA) without rupture (HCC)         HPI  Patient returns for office for early follow-up. He recently had a CT scan that showed an abdominal aortic aneurysm. His PCP wanted him to come to our office for follow-up. He has a history of CAD, hypertension, hyperlipidemia, smoking. He denies any chest pain, unusual dyspnea, orthopnea, PND, edema, palpitations. He had prostate surgery in July and states he has been fairly sedentary since this time. Patient reports he was recently diagnosed with neuropathy in his legs and has started gabapentin which has been helpful     Jose David Gresham NP, APRN - CNP is PCP.   Akbar Paul has the following history as [facility] in [facility]:    Patient Active Problem List    Diagnosis Date Noted    Dyspnea on effort 06/10/2019     Priority: High    H/O heart artery stent 06/10/2019     Priority: High    Abdominal aortic aneurysm (AAA) ([facility] [address] 02/27/2020    Essential hypertension 12/05/2018    Nicotine dependence, cigarettes, w unsp disorders 12/01/2017    CAD (coronary artery disease)     Mixed hyperlipidemia     PVD (peripheral vascular disease) ([address]     Tobacco abuse      Past Medical History:   Diagnosis Date    CAD (coronary artery disease)     RUFINO to LCX 1/13    Hyperlipidemia     Nicotine dependence, cigarettes, w unsp disorders 12/1/2017    Prostate cancer ([address]     PVD [facility]peripheral vascular disease) ([facility] [address]     Tobacco abuse      Past Surgical History:   Procedure Laterality Date    CARDIAC CATHETERIZATION  02/02/2013    EF 65%, patent stent to LCX, no new occlusive disease    CATARACT REMOVAL WITH IMPLANT Bilateral 2014    CORONARY ANGIOPLASTY  01/28/2013    RUFINO to LCX    PROSTATE SURGERY      SHOULDER SURGERY Left      Family History   Problem Relation Age of Onset    Hypertension Mother     Stroke Mother      Social History     Tobacco Use    Smoking status: Current Every Day Smoker     Packs/day: 0.50    Smokeless tobacco: Never Used   Substance Use Topics    Alcohol use: No      Current Outpatient Medications   Medication Sig Dispense Refill    montelukast (SINGULAIR) 10 MG tablet Take 10 mg by mouth nightly      gabapentin (NEURONTIN) 100 MG capsule Take 100 mg by mouth daily.  metoprolol tartrate (LOPRESSOR) 25 MG tablet TAKE 1 TABLET BY MOUTH TWICE A  tablet 3    rosuvastatin (CRESTOR) 10 MG tablet Take 10 mg by mouth daily      calcium citrate (CALCITRATE) 950 MG tablet Take 1 tablet by mouth daily      metaxalone (SKELAXIN) 800 MG tablet Take 800 mg by mouth      traMADol (ULTRAM) 50 MG tablet Take 50 mg by mouth. Marioliva Bellcorey nitroGLYCERIN (NITROSTAT) 0.4 MG SL tablet Place 1 tablet under the tongue every 5 minutes as needed for Chest pain As directed 25 tablet 5    omeprazole (PRILOSEC) 20 MG delayed release capsule Take 20 mg by mouth daily      Multiple Minerals-Vitamins (CALCIUM & VIT D3 BONE HEALTH PO) Take by mouth      Cholecalciferol (VITAMIN D3) 2000 UNITS CAPS Take by mouth      vitamin B-12 (CYANOCOBALAMIN) 1000 MCG tablet Take 1,000 mcg by mouth daily      aspirin 81 MG tablet Take 81 mg by mouth 2 times daily      folic acid (FOLVITE) 1 MG tablet Take 1 mg by mouth daily      Budesonide (PULMICORT FLEXHALER IN) Inhale into the lungs      Fluticasone Furoate-Vilanterol (BREO ELLIPTA) 200-25 MCG/INH AEPB Inhale into the lungs      discussed starting with nicotine replacement therapy. He will consider this    Abdominal aortic aneurysm-patient has a 3.1 cm and 2.4 cm area. We discussed that yearly monitoring would be appropriate and he could have this done in his hometown. We discussed good blood pressure control and smoking cessation    Stable cardiovascular status. No evidence of overt heart failure,angina or dysrhythmia. Plan    Return in about 6 months (around 8/27/2020) for Dr. Kike Finch. Quit smoking. Call the [address] [place] [address] [phone]  Monitor abdominal aortic aneurysm yearly-    Call with any questionsor concerns  Follow up with Danay Bradley NP, APRN - CNP for non cardiac problems  Report any new problems  Cardiovascular Fitness-Exercise as tolerated. Strive for 15 minutes of exercise most days of the week. Cardiac / HealthyDiet  Continue current medications as directed  Continue plan of treatment  It is always recommended that you bring your medicationsbottles with you to each visit - this is for your safety!        Yola Guerrero APRN

## 2020-02-27 NOTE — PATIENT INSTRUCTIONS
Return in about 6 months (around 8/27/2020) for Dr. Gokul Freeman. Quit smoking.   Call the [address] [place] [address] [phone]  Monitor abdominal aortic aneurysm yearly-

## 2020-09-03 ENCOUNTER — OFFICE VISIT (OUTPATIENT)
Dept: CARDIOLOGY | Age: 75
End: 2020-09-03
Payer: MEDICARE

## 2020-09-03 VITALS
HEIGHT: 67 IN | HEART RATE: 64 BPM | BODY MASS INDEX: 23.54 KG/M2 | DIASTOLIC BLOOD PRESSURE: 70 MMHG | SYSTOLIC BLOOD PRESSURE: 120 MMHG | WEIGHT: 150 LBS

## 2020-09-03 PROCEDURE — 99213 OFFICE O/P EST LOW 20 MIN: CPT | Performed by: INTERNAL MEDICINE

## 2020-09-03 ASSESSMENT — ENCOUNTER SYMPTOMS
VOMITING: 0
RESPIRATORY NEGATIVE: 1
NAUSEA: 0
SHORTNESS OF BREATH: 0
DIARRHEA: 0
EYES NEGATIVE: 1
GASTROINTESTINAL NEGATIVE: 1

## 2020-09-03 NOTE — PROGRESS NOTES
[facility] Progress Note                            Date:  9/3/2020  Patient: Maci Garcia  Age:  70 y.o., 1945      Reason for evaluation:         SUBJECTIVE:    Returns today follow-up assessment. Lexiscan stress test 7/9/2019 ejection fraction 82% normal perfusion study. Echocardiogram 7/9/2019 unremarkable study except for evidence of grade 1 diastolic dysfunction. Overall feels well denies chest pain palpitations dyspnea etc.  He still smoking no other complaints. Review of Systems   Constitutional: Negative. Negative for chills, fever and unexpected weight change. HENT: Negative. Eyes: Negative. Respiratory: Negative. Negative for shortness of breath. Cardiovascular: Negative. Negative for chest pain. Gastrointestinal: Negative. Negative for diarrhea, nausea and vomiting. Endocrine: Negative. Genitourinary: Negative. Musculoskeletal: Negative. Skin: Negative. Neurological: Negative. All other systems reviewed and are negative. OBJECTIVE:     /70   Pulse 64   Ht 5' 7\" (1.702 m)   Wt 150 lb (68 kg)   BMI 23.49 kg/m²     Labs:   CBC: No results for input(s): WBC, HGB, HCT, PLT in the last 72 hours. BMP:No results for input(s): NA, K, CO2, BUN, CREATININE, LABGLOM, GLUCOSE in the last 72 hours. BNP: No results for input(s): BNP in the last 72 hours. PT/INR: No results for input(s): PROTIME, INR in the last 72 hours. APTT:No results for input(s): APTT in the last 72 hours. CARDIAC ENZYMES:No results for input(s): CKTOTAL, CKMB, CKMBINDEX, TROPONINI in the last 72 hours. FASTING LIPID PANEL:No results found for: HDL, LDLDIRECT, LDLCALC, TRIG  LIVER PROFILE:No results for input(s): AST, ALT, LABALBU in the last 72 hours.         Past Medical History:   Diagnosis Date    CAD (coronary artery disease)     RUFINO to LCX 1/13    Hyperlipidemia     Nicotine dependence, cigarettes, w unsp disorders 12/1/2017    Prostate cancer ([facility] [address]     PVD (peripheral vascular disease) ([facility] [address]     Tobacco abuse      Past Surgical History:   Procedure Laterality Date    CARDIAC CATHETERIZATION  02/02/2013    EF 65%, patent stent to LCX, no new occlusive disease    CATARACT REMOVAL WITH IMPLANT Bilateral 2014    CORONARY ANGIOPLASTY  01/28/2013    RUFINO to LCX    HERNIA REPAIR      PROSTATE SURGERY      SHOULDER SURGERY Left      Family History   Problem Relation Age of Onset    Hypertension Mother     Stroke Mother      Allergies   Allergen Reactions    Zithromax [Azithromycin]      abd pain.  Penicillins Rash     And pain      Shellfish-Derived Products Rash     Current Outpatient Medications   Medication Sig Dispense Refill    montelukast (SINGULAIR) 10 MG tablet Take 10 mg by mouth nightly      gabapentin (NEURONTIN) 100 MG capsule Take 100 mg by mouth daily.  metoprolol tartrate (LOPRESSOR) 25 MG tablet TAKE 1 TABLET BY MOUTH TWICE A  tablet 3    rosuvastatin (CRESTOR) 10 MG tablet Take 10 mg by mouth daily      calcium citrate (CALCITRATE) 950 MG tablet Take 1 tablet by mouth daily      metaxalone (SKELAXIN) 800 MG tablet Take 800 mg by mouth      traMADol (ULTRAM) 50 MG tablet Take 50 mg by mouth. Genetta Dieter nitroGLYCERIN (NITROSTAT) 0.4 MG SL tablet Place 1 tablet under the tongue every 5 minutes as needed for Chest pain As directed 25 tablet 5    omeprazole (PRILOSEC) 20 MG delayed release capsule Take 20 mg by mouth daily      Multiple Minerals-Vitamins (CALCIUM & VIT D3 BONE HEALTH PO) Take by mouth      Cholecalciferol (VITAMIN D3) 2000 UNITS CAPS Take by mouth      vitamin B-12 (CYANOCOBALAMIN) 1000 MCG tablet Take 1,000 mcg by mouth daily      aspirin 81 MG tablet Take 81 mg by mouth 2 times daily      folic acid (FOLVITE) 1 MG tablet Take 1 mg by mouth daily      Budesonide (PULMICORT FLEXHALER IN) Inhale into the lungs      Fluticasone Furoate-Vilanterol (BREO ELLIPTA) 200-25 MCG/INH AEPB Inhale into the lungs      azelastine HCl 0.15 % SOLN by Nasal route      cilostazol (PLETAL) 100 MG tablet Take 100 mg by mouth 2 times daily       No current facility-administered medications for this visit. Social History     Socioeconomic History    Marital status:      Spouse name: Not on file    Number of children: Not on file    Years of education: Not on file    Highest education level: Not on file   Occupational History    Not on file   Social Needs    Financial resource strain: Not on file    Food insecurity     Worry: Not on file     Inability: Not on file    Transportation needs     Medical: Not on file     Non-medical: Not on file   Tobacco Use    Smoking status: Current Every Day Smoker     Packs/day: 0.50    Smokeless tobacco: Never Used   Substance and Sexual Activity    Alcohol use: No    Drug use: No    Sexual activity: Never   Lifestyle    Physical activity     Days per week: Not on file     Minutes per session: Not on file    Stress: Not on file   Relationships    Social connections     Talks on phone: Not on file     Gets together: Not on file     Attends [employer] service: Not on file     Active member of club or organization: Not on file     Attends meetings of clubs or organizations: Not on file     Relationship status: Not on file    Intimate partner violence     Fear of current or ex partner: Not on file     Emotionally abused: Not on file     Physically abused: Not on file     Forced sexual activity: Not on file   Other Topics Concern    Not on file   Social History Narrative    Not on file       Physical Examination:  /70   Pulse 64   Ht 5' 7\" (1.702 m)   Wt 150 lb (68 kg)   BMI 23.49 kg/m²   Physical Exam  Vitals signs reviewed. Constitutional:       Appearance: He is well-developed. Neck:      Vascular: No carotid bruit or JVD. Cardiovascular:      Rate and Rhythm: Normal rate and regular rhythm. Heart sounds: Normal heart sounds. No murmur. No friction rub.  No gallop. Pulmonary:      Effort: Pulmonary effort is normal. No respiratory distress. Breath sounds: Normal breath sounds. No wheezing or rales. Abdominal:      General: There is no distension. Tenderness: There is no abdominal tenderness. Lymphadenopathy:      Cervical: No cervical adenopathy. Skin:     General: Skin is warm and dry. ASSESSMENT:     Diagnosis Orders   1. Coronary artery disease involving native coronary artery of native heart without angina pectoris     2. Essential hypertension     3. Mixed hyperlipidemia     4. PVD (peripheral vascular disease) ([address]         PLAN:  No orders of the defined types were placed in this encounter. No orders of the defined types were placed in this encounter. 1. Continue present medications  2. Recommend follow-up assessment in 6 months    Return in about 6 months (around 3/3/2021) for return to Dr. Carmen Boucher only. Teddy Foster MD 9/3/2020 10:56 AM CDT    [facility]      Thisdictation was generated by voice recognition computer software. Although all attempts are made to edit the dictation for accuracy, there may be errors in the transcription that are not intended.

## 2021-01-01 ENCOUNTER — LAB (OUTPATIENT)
Dept: LAB | Facility: HOSPITAL | Age: 76
End: 2021-01-01

## 2021-01-01 ENCOUNTER — ANESTHESIA (OUTPATIENT)
Dept: PERIOP | Facility: HOSPITAL | Age: 76
End: 2021-01-01

## 2021-01-01 ENCOUNTER — HOSPITAL ENCOUNTER (OUTPATIENT)
Facility: HOSPITAL | Age: 76
Discharge: HOME | End: 2021-11-18
Attending: OTOLARYNGOLOGY | Admitting: OTOLARYNGOLOGY

## 2021-01-01 ENCOUNTER — HOSPITAL ENCOUNTER (OUTPATIENT)
Dept: GENERAL RADIOLOGY | Facility: HOSPITAL | Age: 76
Setting detail: HOSPITAL OUTPATIENT SURGERY
Discharge: HOME | End: 2021-11-17

## 2021-01-01 ENCOUNTER — OFFICE VISIT (OUTPATIENT)
Dept: OTOLARYNGOLOGY | Facility: CLINIC | Age: 76
End: 2021-01-01

## 2021-01-01 ENCOUNTER — APPOINTMENT (OUTPATIENT)
Dept: MRI IMAGING | Facility: HOSPITAL | Age: 76
End: 2021-01-01

## 2021-01-01 ENCOUNTER — ANESTHESIA EVENT (OUTPATIENT)
Dept: PERIOP | Facility: HOSPITAL | Age: 76
End: 2021-01-01

## 2021-01-01 ENCOUNTER — TRANSCRIBE ORDERS (OUTPATIENT)
Dept: ADMINISTRATIVE | Facility: HOSPITAL | Age: 76
End: 2021-01-01

## 2021-01-01 ENCOUNTER — TELEPHONE (OUTPATIENT)
Dept: OTOLARYNGOLOGY | Facility: CLINIC | Age: 76
End: 2021-01-01

## 2021-01-01 VITALS
HEART RATE: 67 BPM | SYSTOLIC BLOOD PRESSURE: 98 MMHG | OXYGEN SATURATION: 98 % | RESPIRATION RATE: 15 BRPM | WEIGHT: 143.96 LBS | DIASTOLIC BLOOD PRESSURE: 57 MMHG | HEIGHT: 67 IN | BODY MASS INDEX: 22.6 KG/M2 | TEMPERATURE: 97.6 F

## 2021-01-01 VITALS
BODY MASS INDEX: 22.43 KG/M2 | SYSTOLIC BLOOD PRESSURE: 103 MMHG | WEIGHT: 142.9 LBS | HEART RATE: 82 BPM | HEIGHT: 67 IN | TEMPERATURE: 97.7 F | DIASTOLIC BLOOD PRESSURE: 71 MMHG

## 2021-01-01 VITALS
SYSTOLIC BLOOD PRESSURE: 119 MMHG | TEMPERATURE: 98.4 F | RESPIRATION RATE: 16 BRPM | WEIGHT: 144 LBS | HEART RATE: 84 BPM | BODY MASS INDEX: 22.6 KG/M2 | HEIGHT: 67 IN | DIASTOLIC BLOOD PRESSURE: 62 MMHG

## 2021-01-01 DIAGNOSIS — Z01.818 PREOP EXAMINATION: Primary | ICD-10-CM

## 2021-01-01 DIAGNOSIS — R13.14 PHARYNGOESOPHAGEAL DYSPHAGIA: Primary | ICD-10-CM

## 2021-01-01 DIAGNOSIS — C34.02 MALIGNANT NEOPLASM OF HILUS OF LEFT LUNG (HCC): ICD-10-CM

## 2021-01-01 DIAGNOSIS — C34.12 MALIGNANT NEOPLASM OF UPPER LOBE OF LEFT LUNG (HCC): ICD-10-CM

## 2021-01-01 DIAGNOSIS — M79.89 MASS OF SOFT TISSUE OF NECK: ICD-10-CM

## 2021-01-01 DIAGNOSIS — R22.1 MASS OF RIGHT SIDE OF NECK: ICD-10-CM

## 2021-01-01 DIAGNOSIS — C73 THYROID CARCINOMA (HCC): Primary | ICD-10-CM

## 2021-01-01 DIAGNOSIS — C76.0 SQUAMOUS CELL CARCINOMA OF HEAD AND NECK (HCC): Primary | ICD-10-CM

## 2021-01-01 DIAGNOSIS — J38.01 UNILATERAL COMPLETE PARALYSIS OF VOCAL CORD: ICD-10-CM

## 2021-01-01 DIAGNOSIS — C34.92 MALIGNANT NEOPLASM OF LEFT LUNG, UNSPECIFIED PART OF LUNG (HCC): Primary | ICD-10-CM

## 2021-01-01 DIAGNOSIS — R13.14 PHARYNGOESOPHAGEAL DYSPHAGIA: ICD-10-CM

## 2021-01-01 DIAGNOSIS — R22.1 MASS OF RIGHT SIDE OF NECK: Primary | ICD-10-CM

## 2021-01-01 DIAGNOSIS — R51.9 NEW ONSET HEADACHE: Primary | ICD-10-CM

## 2021-01-01 LAB
ALBUMIN SERPL-MCNC: 3.9 G/DL (ref 3.5–5.2)
ALBUMIN/GLOB SERPL: 1.2 G/DL
ALP SERPL-CCNC: 82 U/L (ref 39–117)
ALT SERPL W P-5'-P-CCNC: 17 U/L (ref 1–41)
ANION GAP SERPL CALCULATED.3IONS-SCNC: 11 MMOL/L (ref 5–15)
AST SERPL-CCNC: 18 U/L (ref 1–40)
BILIRUB SERPL-MCNC: 0.2 MG/DL (ref 0–1.2)
BUN SERPL-MCNC: 15 MG/DL (ref 8–23)
BUN/CREAT SERPL: 19 (ref 7–25)
CALCIUM SPEC-SCNC: 9.8 MG/DL (ref 8.6–10.5)
CHLORIDE SERPL-SCNC: 102 MMOL/L (ref 98–107)
CO2 SERPL-SCNC: 28 MMOL/L (ref 22–29)
CREAT SERPL-MCNC: 0.79 MG/DL (ref 0.76–1.27)
CYTO UR: NORMAL
GFR SERPL CREATININE-BSD FRML MDRD: 96 ML/MIN/1.73
GLOBULIN UR ELPH-MCNC: 3.3 GM/DL
GLUCOSE SERPL-MCNC: 93 MG/DL (ref 65–99)
LAB AP CASE REPORT: NORMAL
LAB AP DIAGNOSIS COMMENT: NORMAL
PATH REPORT.FINAL DX SPEC: NORMAL
PATH REPORT.GROSS SPEC: NORMAL
POTASSIUM SERPL-SCNC: 4.3 MMOL/L (ref 3.5–5.2)
PROT SERPL-MCNC: 7.2 G/DL (ref 6–8.5)
QT INTERVAL: 390 MS
QTC INTERVAL: 427 MS
SARS-COV-2 ORF1AB RESP QL NAA+PROBE: NOT DETECTED
SODIUM SERPL-SCNC: 141 MMOL/L (ref 136–145)

## 2021-01-01 PROCEDURE — C1889 IMPLANT/INSERT DEVICE, NOC: HCPCS | Performed by: OTOLARYNGOLOGY

## 2021-01-01 PROCEDURE — 99024 POSTOP FOLLOW-UP VISIT: CPT | Performed by: OTOLARYNGOLOGY

## 2021-01-01 PROCEDURE — 25010000002 FENTANYL CITRATE (PF) 100 MCG/2ML SOLUTION: Performed by: NURSE ANESTHETIST, CERTIFIED REGISTERED

## 2021-01-01 PROCEDURE — 25010000002 DEXAMETHASONE PER 1 MG: Performed by: NURSE ANESTHETIST, CERTIFIED REGISTERED

## 2021-01-01 PROCEDURE — 88342 IMHCHEM/IMCYTCHM 1ST ANTB: CPT | Performed by: OTOLARYNGOLOGY

## 2021-01-01 PROCEDURE — 93010 ELECTROCARDIOGRAM REPORT: CPT | Performed by: INTERNAL MEDICINE

## 2021-01-01 PROCEDURE — 80053 COMPREHEN METABOLIC PANEL: CPT

## 2021-01-01 PROCEDURE — 31575 DIAGNOSTIC LARYNGOSCOPY: CPT | Performed by: OTOLARYNGOLOGY

## 2021-01-01 PROCEDURE — 0 POTASSIUM CHLORIDE PER 2 MEQ: Performed by: OTOLARYNGOLOGY

## 2021-01-01 PROCEDURE — 94799 UNLISTED PULMONARY SVC/PX: CPT

## 2021-01-01 PROCEDURE — 99204 OFFICE O/P NEW MOD 45 MIN: CPT | Performed by: OTOLARYNGOLOGY

## 2021-01-01 PROCEDURE — 93005 ELECTROCARDIOGRAM TRACING: CPT | Performed by: OTOLARYNGOLOGY

## 2021-01-01 PROCEDURE — 60220 PARTIAL REMOVAL OF THYROID: CPT | Performed by: OTOLARYNGOLOGY

## 2021-01-01 PROCEDURE — 25010000002 ONDANSETRON PER 1 MG: Performed by: NURSE ANESTHETIST, CERTIFIED REGISTERED

## 2021-01-01 PROCEDURE — 25010000002 PROPOFOL 10 MG/ML EMULSION: Performed by: NURSE ANESTHETIST, CERTIFIED REGISTERED

## 2021-01-01 PROCEDURE — U0004 COV-19 TEST NON-CDC HGH THRU: HCPCS

## 2021-01-01 PROCEDURE — 21552 EXC NECK LES SC 3 CM/>: CPT | Performed by: OTOLARYNGOLOGY

## 2021-01-01 PROCEDURE — 88307 TISSUE EXAM BY PATHOLOGIST: CPT | Performed by: OTOLARYNGOLOGY

## 2021-01-01 PROCEDURE — 36415 COLL VENOUS BLD VENIPUNCTURE: CPT

## 2021-01-01 PROCEDURE — C9803 HOPD COVID-19 SPEC COLLECT: HCPCS

## 2021-01-01 PROCEDURE — 94640 AIRWAY INHALATION TREATMENT: CPT

## 2021-01-01 PROCEDURE — 71046 X-RAY EXAM CHEST 2 VIEWS: CPT

## 2021-01-01 PROCEDURE — 88341 IMHCHEM/IMCYTCHM EA ADD ANTB: CPT | Performed by: OTOLARYNGOLOGY

## 2021-01-01 DEVICE — SEAL HEMO SURG ARISTA/AH ABS/PWDR 3GM: Type: IMPLANTABLE DEVICE | Site: NECK | Status: FUNCTIONAL

## 2021-01-01 RX ORDER — MORPHINE SULFATE 2 MG/ML
4 INJECTION, SOLUTION INTRAMUSCULAR; INTRAVENOUS
Status: DISCONTINUED | OUTPATIENT
Start: 2021-01-01 | End: 2021-01-01 | Stop reason: HOSPADM

## 2021-01-01 RX ORDER — UREA 10 %
LOTION (ML) TOPICAL DAILY
COMMUNITY

## 2021-01-01 RX ORDER — LIDOCAINE HYDROCHLORIDE 20 MG/ML
INJECTION, SOLUTION EPIDURAL; INFILTRATION; INTRACAUDAL; PERINEURAL AS NEEDED
Status: DISCONTINUED | OUTPATIENT
Start: 2021-01-01 | End: 2021-01-01 | Stop reason: SURG

## 2021-01-01 RX ORDER — DEXAMETHASONE SODIUM PHOSPHATE 4 MG/ML
INJECTION, SOLUTION INTRA-ARTICULAR; INTRALESIONAL; INTRAMUSCULAR; INTRAVENOUS; SOFT TISSUE AS NEEDED
Status: DISCONTINUED | OUTPATIENT
Start: 2021-01-01 | End: 2021-01-01 | Stop reason: SURG

## 2021-01-01 RX ORDER — LABETALOL HYDROCHLORIDE 5 MG/ML
5 INJECTION, SOLUTION INTRAVENOUS
Status: DISCONTINUED | OUTPATIENT
Start: 2021-01-01 | End: 2021-01-01 | Stop reason: HOSPADM

## 2021-01-01 RX ORDER — FLUMAZENIL 0.1 MG/ML
0.2 INJECTION INTRAVENOUS AS NEEDED
Status: DISCONTINUED | OUTPATIENT
Start: 2021-01-01 | End: 2021-01-01 | Stop reason: HOSPADM

## 2021-01-01 RX ORDER — SODIUM CHLORIDE AND POTASSIUM CHLORIDE 150; 450 MG/100ML; MG/100ML
100 INJECTION, SOLUTION INTRAVENOUS CONTINUOUS
Status: DISCONTINUED | OUTPATIENT
Start: 2021-01-01 | End: 2021-01-01 | Stop reason: HOSPADM

## 2021-01-01 RX ORDER — NALOXONE HCL 0.4 MG/ML
0.4 VIAL (ML) INJECTION
Status: DISCONTINUED | OUTPATIENT
Start: 2021-01-01 | End: 2021-01-01 | Stop reason: HOSPADM

## 2021-01-01 RX ORDER — LIDOCAINE HYDROCHLORIDE 10 MG/ML
0.5 INJECTION, SOLUTION EPIDURAL; INFILTRATION; INTRACAUDAL; PERINEURAL ONCE AS NEEDED
Status: DISCONTINUED | OUTPATIENT
Start: 2021-01-01 | End: 2021-01-01 | Stop reason: HOSPADM

## 2021-01-01 RX ORDER — MULTIPLE VITAMINS W/ MINERALS TAB 9MG-400MCG
1 TAB ORAL DAILY
COMMUNITY

## 2021-01-01 RX ORDER — SODIUM CHLORIDE, SODIUM LACTATE, POTASSIUM CHLORIDE, CALCIUM CHLORIDE 600; 310; 30; 20 MG/100ML; MG/100ML; MG/100ML; MG/100ML
1000 INJECTION, SOLUTION INTRAVENOUS CONTINUOUS
Status: DISCONTINUED | OUTPATIENT
Start: 2021-01-01 | End: 2021-01-01 | Stop reason: HOSPADM

## 2021-01-01 RX ORDER — PHENYLEPHRINE HCL IN 0.9% NACL 1 MG/10 ML
SYRINGE (ML) INTRAVENOUS AS NEEDED
Status: DISCONTINUED | OUTPATIENT
Start: 2021-01-01 | End: 2021-01-01 | Stop reason: SURG

## 2021-01-01 RX ORDER — PANTOPRAZOLE SODIUM 40 MG/1
40 TABLET, DELAYED RELEASE ORAL EVERY MORNING
Status: DISCONTINUED | OUTPATIENT
Start: 2021-01-01 | End: 2021-01-01 | Stop reason: HOSPADM

## 2021-01-01 RX ORDER — ROSUVASTATIN CALCIUM 20 MG/1
20 TABLET, COATED ORAL DAILY
COMMUNITY

## 2021-01-01 RX ORDER — IBUPROFEN 200 MG
400 TABLET ORAL EVERY 6 HOURS PRN
Status: DISCONTINUED | OUTPATIENT
Start: 2021-01-01 | End: 2021-01-01 | Stop reason: HOSPADM

## 2021-01-01 RX ORDER — SUCCINYLCHOLINE/SOD CL,ISO/PF 200MG/10ML
SYRINGE (ML) INTRAVENOUS AS NEEDED
Status: DISCONTINUED | OUTPATIENT
Start: 2021-01-01 | End: 2021-01-01 | Stop reason: SURG

## 2021-01-01 RX ORDER — ROSUVASTATIN CALCIUM 20 MG/1
20 TABLET, COATED ORAL DAILY
Status: DISCONTINUED | OUTPATIENT
Start: 2021-01-01 | End: 2021-01-01 | Stop reason: HOSPADM

## 2021-01-01 RX ORDER — FENTANYL CITRATE 50 UG/ML
25 INJECTION, SOLUTION INTRAMUSCULAR; INTRAVENOUS
Status: DISCONTINUED | OUTPATIENT
Start: 2021-01-01 | End: 2021-01-01 | Stop reason: HOSPADM

## 2021-01-01 RX ORDER — SODIUM CHLORIDE 0.9 % (FLUSH) 0.9 %
3 SYRINGE (ML) INJECTION AS NEEDED
Status: DISCONTINUED | OUTPATIENT
Start: 2021-01-01 | End: 2021-01-01 | Stop reason: HOSPADM

## 2021-01-01 RX ORDER — CALCIUM CARBONATE 200(500)MG
1 TABLET,CHEWABLE ORAL DAILY
Status: ON HOLD | COMMUNITY
End: 2021-01-01

## 2021-01-01 RX ORDER — SODIUM CHLORIDE 0.9 % (FLUSH) 0.9 %
10 SYRINGE (ML) INJECTION AS NEEDED
Status: DISCONTINUED | OUTPATIENT
Start: 2021-01-01 | End: 2021-01-01 | Stop reason: HOSPADM

## 2021-01-01 RX ORDER — EZETIMIBE 10 MG/1
10 TABLET ORAL DAILY
COMMUNITY

## 2021-01-01 RX ORDER — METAXALONE 800 MG/1
800 TABLET ORAL 2 TIMES DAILY PRN
Status: DISCONTINUED | OUTPATIENT
Start: 2021-01-01 | End: 2021-01-01 | Stop reason: HOSPADM

## 2021-01-01 RX ORDER — SODIUM CHLORIDE 0.9 % (FLUSH) 0.9 %
10 SYRINGE (ML) INJECTION EVERY 12 HOURS SCHEDULED
Status: DISCONTINUED | OUTPATIENT
Start: 2021-01-01 | End: 2021-01-01 | Stop reason: HOSPADM

## 2021-01-01 RX ORDER — OXYCODONE AND ACETAMINOPHEN 10; 325 MG/1; MG/1
1 TABLET ORAL ONCE AS NEEDED
Status: COMPLETED | OUTPATIENT
Start: 2021-01-01 | End: 2021-01-01

## 2021-01-01 RX ORDER — ONDANSETRON 2 MG/ML
INJECTION INTRAMUSCULAR; INTRAVENOUS AS NEEDED
Status: DISCONTINUED | OUTPATIENT
Start: 2021-01-01 | End: 2021-01-01 | Stop reason: SURG

## 2021-01-01 RX ORDER — IBUPROFEN 600 MG/1
600 TABLET ORAL ONCE AS NEEDED
Status: DISCONTINUED | OUTPATIENT
Start: 2021-01-01 | End: 2021-01-01 | Stop reason: HOSPADM

## 2021-01-01 RX ORDER — DEXTROSE MONOHYDRATE 25 G/50ML
12.5 INJECTION, SOLUTION INTRAVENOUS AS NEEDED
Status: DISCONTINUED | OUTPATIENT
Start: 2021-01-01 | End: 2021-01-01 | Stop reason: HOSPADM

## 2021-01-01 RX ORDER — MAGNESIUM HYDROXIDE 1200 MG/15ML
LIQUID ORAL AS NEEDED
Status: DISCONTINUED | OUTPATIENT
Start: 2021-01-01 | End: 2021-01-01 | Stop reason: HOSPADM

## 2021-01-01 RX ORDER — GABAPENTIN 100 MG/1
100 CAPSULE ORAL 2 TIMES DAILY
COMMUNITY
End: 2022-01-01

## 2021-01-01 RX ORDER — ONDANSETRON 2 MG/ML
4 INJECTION INTRAMUSCULAR; INTRAVENOUS AS NEEDED
Status: DISCONTINUED | OUTPATIENT
Start: 2021-01-01 | End: 2021-01-01 | Stop reason: HOSPADM

## 2021-01-01 RX ORDER — GABAPENTIN 100 MG/1
100 CAPSULE ORAL NIGHTLY
Status: DISCONTINUED | OUTPATIENT
Start: 2021-01-01 | End: 2021-01-01 | Stop reason: HOSPADM

## 2021-01-01 RX ORDER — ONDANSETRON 4 MG/1
4 TABLET, FILM COATED ORAL ONCE AS NEEDED
Status: DISCONTINUED | OUTPATIENT
Start: 2021-01-01 | End: 2021-01-01 | Stop reason: HOSPADM

## 2021-01-01 RX ORDER — NALOXONE HCL 0.4 MG/ML
0.04 VIAL (ML) INJECTION AS NEEDED
Status: DISCONTINUED | OUTPATIENT
Start: 2021-01-01 | End: 2021-01-01 | Stop reason: HOSPADM

## 2021-01-01 RX ORDER — BUDESONIDE AND FORMOTEROL FUMARATE DIHYDRATE 160; 4.5 UG/1; UG/1
2 AEROSOL RESPIRATORY (INHALATION)
Refills: 1 | Status: DISCONTINUED | OUTPATIENT
Start: 2021-01-01 | End: 2021-01-01 | Stop reason: HOSPADM

## 2021-01-01 RX ORDER — HYDROCODONE BITARTRATE AND ACETAMINOPHEN 7.5; 325 MG/1; MG/1
1 TABLET ORAL EVERY 4 HOURS PRN
Status: DISCONTINUED | OUTPATIENT
Start: 2021-01-01 | End: 2021-01-01 | Stop reason: HOSPADM

## 2021-01-01 RX ORDER — NITROGLYCERIN 0.4 MG/1
0.4 TABLET SUBLINGUAL
Status: DISCONTINUED | OUTPATIENT
Start: 2021-01-01 | End: 2021-01-01 | Stop reason: HOSPADM

## 2021-01-01 RX ORDER — ONDANSETRON 2 MG/ML
4 INJECTION INTRAMUSCULAR; INTRAVENOUS ONCE AS NEEDED
Status: DISCONTINUED | OUTPATIENT
Start: 2021-01-01 | End: 2021-01-01 | Stop reason: HOSPADM

## 2021-01-01 RX ORDER — SODIUM CHLORIDE, SODIUM LACTATE, POTASSIUM CHLORIDE, CALCIUM CHLORIDE 600; 310; 30; 20 MG/100ML; MG/100ML; MG/100ML; MG/100ML
9 INJECTION, SOLUTION INTRAVENOUS CONTINUOUS
Status: DISCONTINUED | OUTPATIENT
Start: 2021-01-01 | End: 2021-01-01 | Stop reason: HOSPADM

## 2021-01-01 RX ORDER — HYDROMORPHONE HYDROCHLORIDE 1 MG/ML
0.5 INJECTION, SOLUTION INTRAMUSCULAR; INTRAVENOUS; SUBCUTANEOUS
Status: DISCONTINUED | OUTPATIENT
Start: 2021-01-01 | End: 2021-01-01 | Stop reason: HOSPADM

## 2021-01-01 RX ORDER — PROPOFOL 10 MG/ML
VIAL (ML) INTRAVENOUS AS NEEDED
Status: DISCONTINUED | OUTPATIENT
Start: 2021-01-01 | End: 2021-01-01 | Stop reason: SURG

## 2021-01-01 RX ORDER — TRAMADOL HYDROCHLORIDE 50 MG/1
50 TABLET ORAL 3 TIMES DAILY PRN
Status: DISCONTINUED | OUTPATIENT
Start: 2021-01-01 | End: 2021-01-01 | Stop reason: HOSPADM

## 2021-01-01 RX ORDER — FENTANYL CITRATE 50 UG/ML
INJECTION, SOLUTION INTRAMUSCULAR; INTRAVENOUS AS NEEDED
Status: DISCONTINUED | OUTPATIENT
Start: 2021-01-01 | End: 2021-01-01 | Stop reason: SURG

## 2021-01-01 RX ADMIN — FENTANYL CITRATE 25 MCG: 50 INJECTION, SOLUTION INTRAMUSCULAR; INTRAVENOUS at 11:21

## 2021-01-01 RX ADMIN — IBUPROFEN 400 MG: 200 TABLET, FILM COATED ORAL at 02:00

## 2021-01-01 RX ADMIN — Medication 100 MCG: at 09:57

## 2021-01-01 RX ADMIN — Medication 100 MCG: at 10:21

## 2021-01-01 RX ADMIN — METOPROLOL TARTRATE 25 MG: 25 TABLET, FILM COATED ORAL at 20:40

## 2021-01-01 RX ADMIN — FENTANYL CITRATE 25 MCG: 50 INJECTION, SOLUTION INTRAMUSCULAR; INTRAVENOUS at 09:19

## 2021-01-01 RX ADMIN — METOPROLOL TARTRATE 25 MG: 25 TABLET, FILM COATED ORAL at 07:58

## 2021-01-01 RX ADMIN — POTASSIUM CHLORIDE AND SODIUM CHLORIDE 100 ML/HR: 450; 150 INJECTION, SOLUTION INTRAVENOUS at 15:45

## 2021-01-01 RX ADMIN — FENTANYL CITRATE 25 MCG: 50 INJECTION, SOLUTION INTRAMUSCULAR; INTRAVENOUS at 09:13

## 2021-01-01 RX ADMIN — BUDESONIDE AND FORMOTEROL FUMARATE DIHYDRATE 2 PUFF: 160; 4.5 AEROSOL RESPIRATORY (INHALATION) at 07:39

## 2021-01-01 RX ADMIN — Medication 100 MCG: at 09:41

## 2021-01-01 RX ADMIN — METOPROLOL TARTRATE 25 MG: 25 TABLET, FILM COATED ORAL at 15:45

## 2021-01-01 RX ADMIN — GABAPENTIN 100 MG: 100 CAPSULE ORAL at 20:40

## 2021-01-01 RX ADMIN — FENTANYL CITRATE 25 MCG: 50 INJECTION, SOLUTION INTRAMUSCULAR; INTRAVENOUS at 10:18

## 2021-01-01 RX ADMIN — PANTOPRAZOLE 40 MG: 40 TABLET, DELAYED RELEASE ORAL at 07:20

## 2021-01-01 RX ADMIN — PROPOFOL 50 MG: 10 INJECTION, EMULSION INTRAVENOUS at 10:03

## 2021-01-01 RX ADMIN — DEXAMETHASONE SODIUM PHOSPHATE 8 MG: 4 INJECTION, SOLUTION INTRA-ARTICULAR; INTRALESIONAL; INTRAMUSCULAR; INTRAVENOUS; SOFT TISSUE at 09:38

## 2021-01-01 RX ADMIN — ROSUVASTATIN CALCIUM 20 MG: 20 TABLET, FILM COATED ORAL at 15:44

## 2021-01-01 RX ADMIN — PROPOFOL 100 MG: 10 INJECTION, EMULSION INTRAVENOUS at 09:13

## 2021-01-01 RX ADMIN — ONDANSETRON 4 MG: 2 INJECTION INTRAMUSCULAR; INTRAVENOUS at 09:38

## 2021-01-01 RX ADMIN — ROSUVASTATIN CALCIUM 20 MG: 20 TABLET, FILM COATED ORAL at 07:58

## 2021-01-01 RX ADMIN — SODIUM CHLORIDE, POTASSIUM CHLORIDE, SODIUM LACTATE AND CALCIUM CHLORIDE: 600; 310; 30; 20 INJECTION, SOLUTION INTRAVENOUS at 10:52

## 2021-01-01 RX ADMIN — POTASSIUM CHLORIDE AND SODIUM CHLORIDE 100 ML/HR: 450; 150 INJECTION, SOLUTION INTRAVENOUS at 01:31

## 2021-01-01 RX ADMIN — OXYCODONE AND ACETAMINOPHEN 1 TABLET: 325; 10 TABLET ORAL at 11:51

## 2021-01-01 RX ADMIN — BUDESONIDE AND FORMOTEROL FUMARATE DIHYDRATE 2 PUFF: 160; 4.5 AEROSOL RESPIRATORY (INHALATION) at 20:02

## 2021-01-01 RX ADMIN — IBUPROFEN 400 MG: 200 TABLET, FILM COATED ORAL at 14:41

## 2021-01-01 RX ADMIN — SODIUM CHLORIDE, POTASSIUM CHLORIDE, SODIUM LACTATE AND CALCIUM CHLORIDE 1000 ML: 600; 310; 30; 20 INJECTION, SOLUTION INTRAVENOUS at 07:16

## 2021-01-01 RX ADMIN — LIDOCAINE HYDROCHLORIDE 100 MG: 20 INJECTION, SOLUTION EPIDURAL; INFILTRATION; INTRACAUDAL; PERINEURAL at 09:13

## 2021-01-01 RX ADMIN — Medication 100 MG: at 09:13

## 2021-01-11 DIAGNOSIS — I25.10 CORONARY ARTERY DISEASE INVOLVING NATIVE CORONARY ARTERY OF NATIVE HEART WITHOUT ANGINA PECTORIS: ICD-10-CM

## 2021-05-27 ENCOUNTER — OFFICE VISIT (OUTPATIENT)
Dept: CARDIOLOGY CLINIC | Age: 76
End: 2021-05-27
Payer: OTHER GOVERNMENT

## 2021-05-27 VITALS
HEART RATE: 88 BPM | HEIGHT: 67 IN | BODY MASS INDEX: 22.6 KG/M2 | DIASTOLIC BLOOD PRESSURE: 72 MMHG | SYSTOLIC BLOOD PRESSURE: 118 MMHG | WEIGHT: 144 LBS

## 2021-05-27 DIAGNOSIS — E78.2 MIXED HYPERLIPIDEMIA: ICD-10-CM

## 2021-05-27 DIAGNOSIS — Z95.5 H/O HEART ARTERY STENT: ICD-10-CM

## 2021-05-27 DIAGNOSIS — I10 ESSENTIAL HYPERTENSION: ICD-10-CM

## 2021-05-27 DIAGNOSIS — I25.10 CORONARY ARTERY DISEASE INVOLVING NATIVE CORONARY ARTERY OF NATIVE HEART WITHOUT ANGINA PECTORIS: Primary | ICD-10-CM

## 2021-05-27 DIAGNOSIS — R06.09 DYSPNEA ON EFFORT: ICD-10-CM

## 2021-05-27 PROCEDURE — 99213 OFFICE O/P EST LOW 20 MIN: CPT | Performed by: INTERNAL MEDICINE

## 2021-05-27 PROCEDURE — 93000 ELECTROCARDIOGRAM COMPLETE: CPT | Performed by: INTERNAL MEDICINE

## 2021-05-27 ASSESSMENT — ENCOUNTER SYMPTOMS
VOMITING: 0
SHORTNESS OF BREATH: 0
NAUSEA: 0
RESPIRATORY NEGATIVE: 1
DIARRHEA: 0
GASTROINTESTINAL NEGATIVE: 1
EYES NEGATIVE: 1

## 2021-05-27 NOTE — PROGRESS NOTES
[facility]                            Date:  5/27/2021  Patient: Loida Recinos  Age:  70 y.o., 1945      Reason for evaluation:         SUBJECTIVE:    Returns today follow-up assessment coronary artery disease hypertension hyperlipidemia previous stent placement. Recently completed 38 radiation treatments in March for prostate cancer. Feels tired and worn out all the time a little bit dyspneic no definite anginal chest pain does not take nitroglycerin. Blood pressure 118/72 heart 88. Overall just not feeling very well. Review of Systems   Constitutional: Negative. Negative for chills, fever and unexpected weight change. HENT: Negative. Eyes: Negative. Respiratory: Negative. Negative for shortness of breath. Cardiovascular: Negative. Negative for chest pain. Gastrointestinal: Negative. Negative for diarrhea, nausea and vomiting. Endocrine: Negative. Genitourinary: Negative. Musculoskeletal: Negative. Skin: Negative. Neurological: Negative. All other systems reviewed and are negative. OBJECTIVE:     /72   Pulse 88   Ht 5' 7\" (1.702 m)   Wt 144 lb (65.3 kg)   BMI 22.55 kg/m²     Labs:   CBC: No results for input(s): WBC, HGB, HCT, PLT in the last 72 hours. BMP:No results for input(s): NA, K, CO2, BUN, CREATININE, LABGLOM, GLUCOSE in the last 72 hours. BNP: No results for input(s): BNP in the last 72 hours. PT/INR: No results for input(s): PROTIME, INR in the last 72 hours. APTT:No results for input(s): APTT in the last 72 hours. CARDIAC ENZYMES:No results for input(s): CKTOTAL, CKMB, CKMBINDEX, TROPONINI in the last 72 hours. FASTING LIPID PANEL:No results found for: HDL, LDLDIRECT, LDLCALC, TRIG  LIVER PROFILE:No results for input(s): AST, ALT, LABALBU in the last 72 hours.         Past Medical History:   Diagnosis Date    CAD (coronary artery disease)     RUFINO to LCX 1/13    Hyperlipidemia     Nicotine dependence, cigarettes, w unsp disorders 12/1/2017    Pro[facility] ([facility] [address]     PVD (peripheral vascular disease) ([facility] [address]     Tobacco abuse      Past Surgical History:   Procedure Laterality Date    CARDIAC CATHETERIZATION  02/02/2013    EF 65%, patent stent to LCX, no new occlusive disease    CATARACT REMOVAL WITH IMPLANT Bilateral 2014    CORONARY ANGIOPLASTY  01/28/2013    RUFINO to LCX    HERNIA REPAIR      PROSTATE SURGERY      SHOULDER SURGERY Left      Family History   Problem Relation Age of Onset    Hypertension Mother     Stroke Mother      Allergies   Allergen Reactions    Zithromax [Azithromycin]      abd pain.  Penicillins Rash     And pain      Shellfish-Derived Products Rash     Current Outpatient Medications   Medication Sig Dispense Refill    metoprolol tartrate (LOPRESSOR) 25 MG tablet TAKE 1 TABLET BY MOUTH TWICE A  tablet 3    montelukast (SINGULAIR) 10 MG tablet Take 10 mg by mouth nightly      gabapentin (NEURONTIN) 100 MG capsule Take 100 mg by mouth daily.       rosuvastatin (CRESTOR) 10 MG tablet Take 10 mg by mouth daily      calcium citrate (CALCITRATE) 950 MG tablet Take 1 tablet by mouth daily      metaxalone (SKELAXIN) 800 MG tablet Take 800 mg by mouth      nitroGLYCERIN (NITROSTAT) 0.4 MG SL tablet Place 1 tablet under the tongue every 5 minutes as needed for Chest pain As directed 25 tablet 5    omeprazole (PRILOSEC) 20 MG delayed release capsule Take 20 mg by mouth daily      Multiple Minerals-Vitamins (CALCIUM & VIT D3 BONE HEALTH PO) Take by mouth      Cholecalciferol (VITAMIN D3) 2000 UNITS CAPS Take by mouth      vitamin B-12 (CYANOCOBALAMIN) 1000 MCG tablet Take 1,000 mcg by mouth daily      aspirin 81 MG tablet Take 81 mg by mouth 2 times daily      folic acid (FOLVITE) 1 MG tablet Take 1 mg by mouth daily      Budesonide (PULMICORT FLEXHALER IN) Inhale into the lungs      Fluticasone Furoate-Vilanterol (BREO ELLIPTA) 200-25 MCG/INH AEPB Inhale into the or JVD. Cardiovascular:      Rate and Rhythm: Normal rate and regular rhythm. Heart sounds: Normal heart sounds. No murmur heard. No friction rub. No gallop. Pulmonary:      Effort: Pulmonary effort is normal. No respiratory distress. Breath sounds: Normal breath sounds. No wheezing or rales. Abdominal:      General: There is no distension. Tenderness: There is no abdominal tenderness. Lymphadenopathy:      Cervical: No cervical adenopathy. Skin:     General: Skin is warm and dry. ASSESSMENT:     Diagnosis Orders   1. Coronary artery disease involving native coronary artery of native heart without angina pectoris  EKG 12 lead   2. Essential hypertension     3. Mixed hyperlipidemia     4. Dyspnea on effort     5. H/O heart artery stent         PLAN:  Orders Placed This Encounter   Procedures    EKG 12 lead     No orders of the defined types were placed in this encounter. 1. Continue present medications  2. Recommend follow-up assessment in 6 months    Return in about 6 months (around 11/27/2021) for return to Dr. Celeste Moss only. Martín Nguyen MD 5/27/2021 2:02 PM CDT    [facility]      Thisdictation was generated by voice recognition computer software. Although all attempts are made to edit the dictation for accuracy, there may be errors in the transcription that are not intended.

## 2021-11-04 ENCOUNTER — OFFICE VISIT (OUTPATIENT)
Dept: PULMONOLOGY | Age: 76
End: 2021-11-04
Payer: MEDICARE

## 2021-11-04 VITALS
WEIGHT: 143.8 LBS | TEMPERATURE: 97.6 F | OXYGEN SATURATION: 95 % | HEART RATE: 90 BPM | DIASTOLIC BLOOD PRESSURE: 64 MMHG | SYSTOLIC BLOOD PRESSURE: 114 MMHG | HEIGHT: 67 IN | BODY MASS INDEX: 22.57 KG/M2

## 2021-11-04 DIAGNOSIS — Z01.818 PREOPERATIVE TESTING: ICD-10-CM

## 2021-11-04 DIAGNOSIS — I10 ESSENTIAL HYPERTENSION: ICD-10-CM

## 2021-11-04 DIAGNOSIS — F17.210 CIGARETTE NICOTINE DEPENDENCE WITHOUT COMPLICATION: ICD-10-CM

## 2021-11-04 DIAGNOSIS — R91.8 LUNG MASS: Primary | ICD-10-CM

## 2021-11-04 DIAGNOSIS — R05.9 COUGH: ICD-10-CM

## 2021-11-04 DIAGNOSIS — Z01.818 PREOPERATIVE TESTING: Primary | ICD-10-CM

## 2021-11-04 LAB — SARS-COV-2, NAAT: NOT DETECTED

## 2021-11-04 PROCEDURE — 99406 BEHAV CHNG SMOKING 3-10 MIN: CPT | Performed by: INTERNAL MEDICINE

## 2021-11-04 PROCEDURE — 99204 OFFICE O/P NEW MOD 45 MIN: CPT | Performed by: INTERNAL MEDICINE

## 2021-11-04 RX ORDER — EZETIMIBE 10 MG/1
10 TABLET ORAL DAILY
COMMUNITY

## 2021-11-04 ASSESSMENT — ENCOUNTER SYMPTOMS
COUGH: 1
ABDOMINAL DISTENTION: 0
CHEST TIGHTNESS: 0
ANAL BLEEDING: 0
ABDOMINAL PAIN: 0
APNEA: 0
RHINORRHEA: 0
SHORTNESS OF BREATH: 0
WHEEZING: 0
BACK PAIN: 0

## 2021-11-04 NOTE — PROGRESS NOTES
Pulmonary and Sleep Medicine    Mali Ferrara (:  1945) is a 78 y.o. male,New patient, here for evaluation of the following chief complaint(s):  New Patient (Lung nodules on PET scand and CT (Dr. Ariella Eaton))      ASSESSMENT/PLAN:  1. Lung mass  -     Case Request  2. Cough  3. Cigarette nicotine dependence without complication  4. Essential hypertension        Findings on the CT of the chest are highly suggestive for malignancy. Discussed bronchoscopy and biopsy the patient is in agreement. We will arrange for the procedure to be done in the morning. Ada Kellogg MD[facility], [facility]    Return in about 2 weeks (around 2021). SUBJECTIVE/OBJECTIVE:  He is here for evaluation of lung mass. He developed a cough for which he had a CXR done and did show changes suspicious of lung mass. CT of the chest follow and then a PET CT that showed a hilar lung mass on the left that FDG avid and highly suggestive of malignancy. I was asked to see him for the above. He lost about 25 pounds. Prior to Visit Medications    Medication Sig Taking? Authorizing Provider   ezetimibe (ZETIA) 10 MG tablet Take 10 mg by mouth daily Yes Historical Provider, MD   metoprolol tartrate (LOPRESSOR) 25 MG tablet TAKE 1 TABLET BY MOUTH TWICE A DAY Yes DIVYA Herring   montelukast (SINGULAIR) 10 MG tablet Take 10 mg by mouth nightly Yes Historical Provider, MD   gabapentin (NEURONTIN) 100 MG capsule Take 100 mg by mouth daily. Yes Historical Provider, MD   rosuvastatin (CRESTOR) 10 MG tablet Take 10 mg by mouth daily Yes Historical Provider, MD   calcium citrate (CALCITRATE) 950 MG tablet Take 1 tablet by mouth daily Yes Historical Provider, MD   metaxalone (SKELAXIN) 800 MG tablet Take 800 mg by mouth Yes Historical Provider, MD   traMADol (ULTRAM) 50 MG tablet Take 50 mg by mouth. . Yes Historical Provider, MD   nitroGLYCERIN (NITROSTAT) 0.4 MG SL tablet Place 1 tablet under the tongue every 5 minutes as 1414   BP: 114/64   Pulse: 90   Temp: 97.6 °F (36.4 °C)   SpO2: 95%     Physical Exam  Vitals reviewed. Constitutional:       Appearance: Normal appearance. HENT:      Head: Normocephalic and atraumatic. Nose: Nose normal.   Eyes:      Extraocular Movements: Extraocular movements intact. Conjunctiva/sclera: Conjunctivae normal.   Cardiovascular:      Rate and Rhythm: Normal rate and regular rhythm. Heart sounds: No murmur heard. No friction rub. Pulmonary:      Effort: Pulmonary effort is normal. No respiratory distress. Breath sounds: Normal breath sounds. No stridor. No wheezing, rhonchi or rales. Abdominal:      General: There is no distension. Palpations: There is no mass. Tenderness: There is no abdominal tenderness. There is no guarding or rebound. Musculoskeletal:      Cervical back: Normal range of motion and neck supple. Neurological:      Mental Status: He is alert and oriented to person, place, and time. This note was generated used a voice recognition software. Errors in voice recognition may have occurred. An electronic signature was used to authenticate this note.     --Yamilet Campoverde MD

## 2021-11-05 ENCOUNTER — HOSPITAL ENCOUNTER (OUTPATIENT)
Age: 76
Setting detail: OUTPATIENT SURGERY
Discharge: HOME | End: 2021-11-05
Attending: INTERNAL MEDICINE | Admitting: INTERNAL MEDICINE
Payer: MEDICARE

## 2021-11-05 ENCOUNTER — PREP FOR PROCEDURE (OUTPATIENT)
Dept: PULMONOLOGY | Age: 76
End: 2021-11-05

## 2021-11-05 ENCOUNTER — ANESTHESIA (OUTPATIENT)
Dept: ENDOSCOPY | Age: 76
End: 2021-11-05
Payer: MEDICARE

## 2021-11-05 ENCOUNTER — ANESTHESIA EVENT (OUTPATIENT)
Dept: ENDOSCOPY | Age: 76
End: 2021-11-05
Payer: MEDICARE

## 2021-11-05 VITALS — TEMPERATURE: 97 F | DIASTOLIC BLOOD PRESSURE: 56 MMHG | OXYGEN SATURATION: 97 % | SYSTOLIC BLOOD PRESSURE: 95 MMHG

## 2021-11-05 VITALS
BODY MASS INDEX: 22.29 KG/M2 | TEMPERATURE: 97.8 F | RESPIRATION RATE: 14 BRPM | HEIGHT: 67 IN | OXYGEN SATURATION: 96 % | SYSTOLIC BLOOD PRESSURE: 113 MMHG | DIASTOLIC BLOOD PRESSURE: 73 MMHG | WEIGHT: 142 LBS | HEART RATE: 77 BPM

## 2021-11-05 DIAGNOSIS — R91.8 LUNG MASS: ICD-10-CM

## 2021-11-05 DIAGNOSIS — R91.8 LUNG MASS: Primary | ICD-10-CM

## 2021-11-05 PROCEDURE — 7100000011 HC PHASE II RECOVERY - ADDTL 15 MIN: Performed by: INTERNAL MEDICINE

## 2021-11-05 PROCEDURE — 3700000001 HC ADD 15 MINUTES (ANESTHESIA): Performed by: INTERNAL MEDICINE

## 2021-11-05 PROCEDURE — 88112 CYTOPATH CELL ENHANCE TECH: CPT

## 2021-11-05 PROCEDURE — 31652 BRONCH EBUS SAMPLNG 1/2 NODE: CPT | Performed by: INTERNAL MEDICINE

## 2021-11-05 PROCEDURE — 7100000010 HC PHASE II RECOVERY - FIRST 15 MIN: Performed by: INTERNAL MEDICINE

## 2021-11-05 PROCEDURE — 88329 PATH CONSLTJ DRG SURG: CPT

## 2021-11-05 PROCEDURE — 7100000001 HC PACU RECOVERY - ADDTL 15 MIN: Performed by: INTERNAL MEDICINE

## 2021-11-05 PROCEDURE — 88305 TISSUE EXAM BY PATHOLOGIST: CPT

## 2021-11-05 PROCEDURE — 6360000002 HC RX W HCPCS: Performed by: NURSE ANESTHETIST, CERTIFIED REGISTERED

## 2021-11-05 PROCEDURE — 2580000003 HC RX 258: Performed by: NURSE ANESTHETIST, CERTIFIED REGISTERED

## 2021-11-05 PROCEDURE — 88173 CYTOPATH EVAL FNA REPORT: CPT

## 2021-11-05 PROCEDURE — 2580000003 HC RX 258: Performed by: INTERNAL MEDICINE

## 2021-11-05 PROCEDURE — 2500000003 HC RX 250 WO HCPCS: Performed by: NURSE ANESTHETIST, CERTIFIED REGISTERED

## 2021-11-05 PROCEDURE — 2709999900 HC NON-CHARGEABLE SUPPLY: Performed by: INTERNAL MEDICINE

## 2021-11-05 PROCEDURE — 88333 PATH CONSLTJ SURG CYTO XM 1: CPT

## 2021-11-05 PROCEDURE — 88334 PATH CONSLTJ SURG CYTO XM EA: CPT

## 2021-11-05 PROCEDURE — 31625 BRONCHOSCOPY W/BIOPSY(S): CPT | Performed by: INTERNAL MEDICINE

## 2021-11-05 PROCEDURE — 3700000000 HC ANESTHESIA ATTENDED CARE: Performed by: INTERNAL MEDICINE

## 2021-11-05 PROCEDURE — 7100000000 HC PACU RECOVERY - FIRST 15 MIN: Performed by: INTERNAL MEDICINE

## 2021-11-05 PROCEDURE — 3603165200 HC BRNCHSC EBUS GUIDED SAMPL 1/2 NODE STATION/STRUX: Performed by: INTERNAL MEDICINE

## 2021-11-05 RX ORDER — MEPERIDINE HYDROCHLORIDE 25 MG/ML
12.5 INJECTION INTRAMUSCULAR; INTRAVENOUS; SUBCUTANEOUS EVERY 5 MIN PRN
Status: DISCONTINUED | OUTPATIENT
Start: 2021-11-05 | End: 2021-11-05 | Stop reason: HOSPADM

## 2021-11-05 RX ORDER — ENALAPRILAT 2.5 MG/2ML
1.25 INJECTION INTRAVENOUS
Status: DISCONTINUED | OUTPATIENT
Start: 2021-11-05 | End: 2021-11-05 | Stop reason: HOSPADM

## 2021-11-05 RX ORDER — SODIUM CHLORIDE 9 MG/ML
INJECTION, SOLUTION INTRAVENOUS CONTINUOUS
Status: DISCONTINUED | OUTPATIENT
Start: 2021-11-05 | End: 2021-11-05 | Stop reason: HOSPADM

## 2021-11-05 RX ORDER — FENTANYL CITRATE 50 UG/ML
INJECTION, SOLUTION INTRAMUSCULAR; INTRAVENOUS PRN
Status: DISCONTINUED | OUTPATIENT
Start: 2021-11-05 | End: 2021-11-05 | Stop reason: SDUPTHER

## 2021-11-05 RX ORDER — HYDRALAZINE HYDROCHLORIDE 20 MG/ML
5 INJECTION INTRAMUSCULAR; INTRAVENOUS EVERY 10 MIN PRN
Status: DISCONTINUED | OUTPATIENT
Start: 2021-11-05 | End: 2021-11-05 | Stop reason: HOSPADM

## 2021-11-05 RX ORDER — DIPHENHYDRAMINE HYDROCHLORIDE 50 MG/ML
12.5 INJECTION INTRAMUSCULAR; INTRAVENOUS
Status: DISCONTINUED | OUTPATIENT
Start: 2021-11-05 | End: 2021-11-05 | Stop reason: HOSPADM

## 2021-11-05 RX ORDER — LABETALOL HYDROCHLORIDE 5 MG/ML
5 INJECTION, SOLUTION INTRAVENOUS EVERY 10 MIN PRN
Status: DISCONTINUED | OUTPATIENT
Start: 2021-11-05 | End: 2021-11-05 | Stop reason: HOSPADM

## 2021-11-05 RX ORDER — ONDANSETRON 2 MG/ML
INJECTION INTRAMUSCULAR; INTRAVENOUS PRN
Status: DISCONTINUED | OUTPATIENT
Start: 2021-11-05 | End: 2021-11-05 | Stop reason: SDUPTHER

## 2021-11-05 RX ORDER — MORPHINE SULFATE 2 MG/ML
2 INJECTION, SOLUTION INTRAMUSCULAR; INTRAVENOUS EVERY 5 MIN PRN
Status: DISCONTINUED | OUTPATIENT
Start: 2021-11-05 | End: 2021-11-05 | Stop reason: HOSPADM

## 2021-11-05 RX ORDER — ONDANSETRON 2 MG/ML
INJECTION INTRAMUSCULAR; INTRAVENOUS PRN
Status: DISCONTINUED | OUTPATIENT
Start: 2021-11-05 | End: 2021-11-05

## 2021-11-05 RX ORDER — PROMETHAZINE HYDROCHLORIDE 25 MG/ML
6.25 INJECTION, SOLUTION INTRAMUSCULAR; INTRAVENOUS
Status: DISCONTINUED | OUTPATIENT
Start: 2021-11-05 | End: 2021-11-05 | Stop reason: HOSPADM

## 2021-11-05 RX ORDER — MORPHINE SULFATE 4 MG/ML
4 INJECTION, SOLUTION INTRAMUSCULAR; INTRAVENOUS EVERY 5 MIN PRN
Status: DISCONTINUED | OUTPATIENT
Start: 2021-11-05 | End: 2021-11-05 | Stop reason: HOSPADM

## 2021-11-05 RX ORDER — HYDROMORPHONE HYDROCHLORIDE 1 MG/ML
0.5 INJECTION, SOLUTION INTRAMUSCULAR; INTRAVENOUS; SUBCUTANEOUS EVERY 5 MIN PRN
Status: DISCONTINUED | OUTPATIENT
Start: 2021-11-05 | End: 2021-11-05 | Stop reason: HOSPADM

## 2021-11-05 RX ORDER — LIDOCAINE HYDROCHLORIDE 10 MG/ML
INJECTION, SOLUTION INFILTRATION; PERINEURAL PRN
Status: DISCONTINUED | OUTPATIENT
Start: 2021-11-05 | End: 2021-11-05 | Stop reason: SDUPTHER

## 2021-11-05 RX ORDER — HYDROMORPHONE HYDROCHLORIDE 1 MG/ML
0.25 INJECTION, SOLUTION INTRAMUSCULAR; INTRAVENOUS; SUBCUTANEOUS EVERY 5 MIN PRN
Status: DISCONTINUED | OUTPATIENT
Start: 2021-11-05 | End: 2021-11-05 | Stop reason: HOSPADM

## 2021-11-05 RX ORDER — SODIUM CHLORIDE 9 MG/ML
INJECTION, SOLUTION INTRAVENOUS CONTINUOUS PRN
Status: DISCONTINUED | OUTPATIENT
Start: 2021-11-05 | End: 2021-11-05 | Stop reason: SDUPTHER

## 2021-11-05 RX ORDER — METOCLOPRAMIDE HYDROCHLORIDE 5 MG/ML
10 INJECTION INTRAMUSCULAR; INTRAVENOUS
Status: DISCONTINUED | OUTPATIENT
Start: 2021-11-05 | End: 2021-11-05 | Stop reason: HOSPADM

## 2021-11-05 RX ORDER — PROPOFOL 10 MG/ML
INJECTION, EMULSION INTRAVENOUS PRN
Status: DISCONTINUED | OUTPATIENT
Start: 2021-11-05 | End: 2021-11-05 | Stop reason: SDUPTHER

## 2021-11-05 RX ORDER — EPHEDRINE SULFATE 50 MG/ML
INJECTION, SOLUTION INTRAVENOUS PRN
Status: DISCONTINUED | OUTPATIENT
Start: 2021-11-05 | End: 2021-11-05 | Stop reason: SDUPTHER

## 2021-11-05 RX ADMIN — FENTANYL CITRATE 25 MCG: 50 INJECTION INTRAMUSCULAR; INTRAVENOUS at 12:06

## 2021-11-05 RX ADMIN — FENTANYL CITRATE 25 MCG: 50 INJECTION INTRAMUSCULAR; INTRAVENOUS at 12:29

## 2021-11-05 RX ADMIN — FENTANYL CITRATE 25 MCG: 50 INJECTION INTRAMUSCULAR; INTRAVENOUS at 12:44

## 2021-11-05 RX ADMIN — FENTANYL CITRATE 25 MCG: 50 INJECTION INTRAMUSCULAR; INTRAVENOUS at 12:48

## 2021-11-05 ASSESSMENT — PAIN - FUNCTIONAL ASSESSMENT: PAIN_FUNCTIONAL_ASSESSMENT: 0-10

## 2021-11-05 ASSESSMENT — ENCOUNTER SYMPTOMS: SHORTNESS OF BREATH: 1

## 2021-11-05 ASSESSMENT — LIFESTYLE VARIABLES: SMOKING_STATUS: 1

## 2021-11-05 NOTE — ANESTHESIA POSTPROCEDURE EVALUATION
Department of Anesthesiology  Postprocedure Note    Patient: Julius Falk  MRN: [id number]  YOB: 1945  Date of evaluation: 11/5/2021  Time:  1:17 PM     Procedure Summary     Date: 11/05/21 Room / Location: [address]    Anesthesia Start: 1159 Anesthesia Stop: 2944    Procedure: BRONCHOSCOPY ENDOBRONCHIAL ULTRASOUND (Left Chest) Diagnosis:       Lung mass      (lung mass)    Surgeons: Prem Alexander MD Responsible Provider: DIVYA Fuentes CRNA    Anesthesia Type: general ASA Status: 4          Anesthesia Type: general    Adrienne Phase I: Adrienne Score: 5    Adrienne Phase II:      Last vitals: Reviewed and per EMR flowsheets.        Anesthesia Post Evaluation    Patient location during evaluation: PACU  Patient participation: complete - patient participated  Level of consciousness: sleepy but conscious  Pain score: 2  Airway patency: patent  Nausea & Vomiting: no nausea and no vomiting  Complications: no  Cardiovascular status: hemodynamically stable  Respiratory status: acceptable, spontaneous ventilation and face mask  Hydration status: euvolemic

## 2021-11-05 NOTE — ANESTHESIA PRE PROCEDURE
Department of Anesthesiology  Preprocedure Note       Name:  Eric Overall   Age:  78 y.o.  :  1945                                          MRN:  [id number]         Date:  2021      Surgeon: Jodi Mcclendon): Yu Tay MD    Procedure: Procedure(s):  BRONCHOSCOPY ENDOBRONCHIAL ULTRASOUND    Medications prior to admission:   Prior to Admission medications    Medication Sig Start Date End Date Taking? Authorizing Provider   ezetimibe (ZETIA) 10 MG tablet Take 10 mg by mouth daily    Historical Provider, MD   metoprolol tartrate (LOPRESSOR) 25 MG tablet TAKE 1 TABLET BY MOUTH TWICE A DAY 21   DIVYA Brooks   montelukast (SINGULAIR) 10 MG tablet Take 10 mg by mouth nightly    Historical Provider, MD   gabapentin (NEURONTIN) 100 MG capsule Take 100 mg by mouth daily. Historical Provider, MD   rosuvastatin (CRESTOR) 10 MG tablet Take 10 mg by mouth daily    Historical Provider, MD   calcium citrate (CALCITRATE) 950 MG tablet Take 1 tablet by mouth daily    Historical Provider, MD   metaxalone (SKELAXIN) 800 MG tablet Take 800 mg by mouth 17   Historical Provider, MD   traMADol (ULTRAM) 50 MG tablet Take 50 mg by mouth. . 17   Historical Provider, MD   nitroGLYCERIN (NITROSTAT) 0.4 MG SL tablet Place 1 tablet under the tongue every 5 minutes as needed for Chest pain As directed 17   DIVYA Brooks   omeprazole (PRILOSEC) 20 MG delayed release capsule Take 20 mg by mouth daily    Historical Provider, MD   Multiple Minerals-Vitamins (CALCIUM & VIT D3 BONE HEALTH PO) Take by mouth    Historical Provider, MD   Cholecalciferol (VITAMIN D3) 2000 UNITS CAPS Take by mouth    Historical Provider, MD   vitamin B-12 (CYANOCOBALAMIN) 1000 MCG tablet Take 1,000 mcg by mouth daily    Historical Provider, MD   aspirin 81 MG tablet Take 81 mg by mouth 2 times daily    Historical Provider, MD   folic acid (FOLVITE) 1 MG tablet Take 1 mg by mouth daily    Historical Provider, MD Fluticasone Furoate-Vilanterol (BREO ELLIPTA) 200-25 MCG/INH AEPB Inhale into the lungs    Historical Provider, MD   azelastine HCl 0.15 % SOLN by Nasal route    Historical Provider, MD   cilostazol (PLETAL) 100 MG tablet Take 100 mg by mouth 2 times daily    Historical Provider, MD       Current medications:    Current Facility-Administered Medications   Medication Dose Route Frequency Provider Last Rate Last Admin    0.9 % sodium chloride infusion   IntraVENous Continuous Bharath Tyler  mL/hr at 11/05/21 1000 New Bag at 11/05/21 1000    meperidine (DEMEROL) injection 12.5 mg  12.5 mg IntraVENous Q5 Min PRN Wally Mackintosh, APRN - CRNA        HYDROmorphone HCl PF (DILAUDID) injection 0.25 mg  0.25 mg IntraVENous Q5 Min PRN Wally Mackintosh, APRN - CRNA        HYDROmorphone HCl PF (DILAUDID) injection 0.5 mg  0.5 mg IntraVENous Q5 Min PRN Wally Mackintosh, APRN - CRNA        morphine (PF) injection 2 mg  2 mg IntraVENous Q5 Min PRN Wally Mackintosh, APRN - CRNA        morphine injection 4 mg  4 mg IntraVENous Q5 Min PRN Wally Mackintosh, APRN - CRNA        promethazine (PHENERGAN) injection 6.25 mg  6.25 mg IntraVENous Once PRN Wally Mackintosh, APRN - CRNA        metoclopramide (REGLAN) injection 10 mg  10 mg IntraVENous Once PRN Wally Mackintosh, APRN - CRNA        diphenhydrAMINE (BENADRYL) injection 12.5 mg  12.5 mg IntraVENous Once PRN Wally Mackintosh, APRN - CRNA        labetalol (NORMODYNE;TRANDATE) injection 5 mg  5 mg IntraVENous Q10 Min PRN Wally Mackintosh, APRN - CRNA        hydrALAZINE (APRESOLINE) injection 5 mg  5 mg IntraVENous Q10 Min PRN Wally Mackintosh, APRN - CRNA        enalaprilat (VASOTEC) injection 1.25 mg  1.25 mg IntraVENous Once PRN Wally Mackintosh, APRN - CRNA           Allergies: Allergies   Allergen Reactions    Zithromax [Azithromycin]      abd pain.      Penicillins Rash     And pain      Shellfish-Derived Products Rash       Problem List:    Patient Active Problem List Diagnosis Code    CAD (coronary artery disease) I25.10    Mixed hyperlipidemia E78.2    PVD (peripheral vascular disease) ([facility] [address] I73.9    Tobacco abuse Z72.0    Cigarette nicotine dependence without complication Y21.089    Essential hypertension I10    Dyspnea on effort R06.00    H/O heart artery stent Z95.5    Abdominal aortic aneurysm (AAA) (HCC) I71.4    Cough R05.9    Lung mass R91.8       Past Medical History:        Diagnosis Date    CAD (coronary artery disease)     RUFINO to LCX 1/13    Hyperlipidemia     Nicotine dependence, cigarettes, w unsp disorders 12/1/2017    Prostate cancer ([facility] [address]     [facility]     Tobacco abuse        Past Surgical History:        Procedure Laterality Date    CARDIAC CATHETERIZATION  02/02/2013    EF 65%, patent stent to LCX, no new occlusive disease    CATARACT REMOVAL WITH IMPLANT Bilateral 2014    CORONARY ANGIOPLASTY  01/28/2013    RUFINO to LCX    HERNIA REPAIR      PROSTATE SURGERY      SHOULDER SURGERY Left        Social History:    Social History     Tobacco Use    Smoking status: Current Every Day Smoker     Packs/day: 0.50     Years: 60.00     Pack years: 30.00     Types: Cigarettes    Smokeless tobacco: Never Used   Substance Use Topics    Alcohol use:  No                                Ready to quit: Not Answered  Counseling given: Not Answered      Vital Signs (Current):   Vitals:    11/05/21 0958   BP: 137/83   Pulse: 96   Resp: 16   Temp: 98.4 °F (36.9 °C)   TempSrc: Tympanic   SpO2: 95%   Weight: 142 lb (64.4 kg)   Height: 5' 7\" (1.702 m)                                              BP Readings from Last 3 Encounters:   11/05/21 137/83   11/04/21 114/64   05/27/21 118/72       NPO Status: Time of last liquid consumption: 0000                        Time of last solid consumption: 0000                        Date of last liquid consumption: 11/04/21                        Date of last solid food consumption: 11/04/21    BMI:   Wt Readings from Last 3 Encounters:   11/05/21 142 lb (64.4 kg)   11/04/21 143 lb 12.8 oz (65.2 kg)   05/27/21 144 lb (65.3 kg)     Body mass index is 22.24 kg/m². CBC: No results found for: WBC, RBC, HGB, HCT, MCV, RDW, PLT    CMP: No results found for: NA, K, CL, CO2, BUN, CREATININE, GFRAA, AGRATIO, LABGLOM, GLUCOSE, PROT, CALCIUM, BILITOT, ALKPHOS, AST, ALT    POC Tests: No results for input(s): POCGLU, POCNA, POCK, POCCL, POCBUN, POCHEMO, POCHCT in the last 72 hours. Coags: No results found for: PROTIME, INR, APTT    HCG (If Applicable): No results found for: PREGTESTUR, PREGSERUM, HCG, HCGQUANT     ABGs: No results found for: PHART, PO2ART, IGH5LDY, SHK1UJG, BEART, U0OADBGQ     Type & Screen (If Applicable):  No results found for: LABABO, LABRH    Drug/Infectious Status (If Applicable):  No results found for: HIV, HEPCAB    COVID-19 Screening (If Applicable):   Lab Results   Component Value Date    COVID19 Not Detected 11/04/2021           Anesthesia Evaluation    Airway: Mallampati: II  TM distance: >3 FB   Neck ROM: full  Mouth opening: > = 3 FB Dental:    (+) edentulous      Pulmonary:normal exam    (+) COPD:  shortness of breath:  current smoker          Patient did not smoke on day of surgery. Cardiovascular:  Exercise tolerance: poor (<4 METS),   (+) hypertension:, CAD:, LOVING:, hyperlipidemia        Rhythm: regular  Rate: normal           Beta Blocker:  Not on Beta Blocker         Neuro/Psych:   (+) psychiatric history:            GI/Hepatic/Renal:   (+) GERD: well controlled,           Endo/Other:    (+) malignancy/cancer. Abdominal:             Vascular: negative vascular ROS. Other Findings:             Anesthesia Plan      general     ASA 4       Induction: intravenous. MIPS: Prophylactic antiemetics administered. Anesthetic plan and risks discussed with patient. Use of blood products discussed with patient whom. Wally Dennison, APRN - CRNA   11/5/2021

## 2021-11-05 NOTE — PROCEDURES
After consent and under anesthesia delivered by the anesthesia service the patient underwent bronchoscopy through the LMA. The vocal cords were normal.  Trachea was normal.  Patricia was sharp and midline. Right bronchial tree was unremarkable. No endobronchial lesions. 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 The invasion was at about 2.5 to 3 cm from the patricia. There was extrinsic compression and total obliteration of the left lower lobe bronchus. At the lingula and left upper lobe bronchi were normal.  Endobronchial biopsy was done with touch prep done intraoperatively and confirmation of malignancy by Dr. Laura Andrews. Endobronchial ultrasound scope was then used to localize the left hilar mass. Fine-needle aspiration was done x3. No immediate complication. Estimated blood loss: 3 cc. Complications: None. Specimen:  1. Endobronchial biopsy left mainstem bronchus endobronchial tumor x3 for pathology. 2.  Endobronchial ultrasound-guided needle aspiration left hilar mass x3 for cytology. 3.  Bronchial washing left mainstem bronchus for cytology. Disposition: PACU per anesthesia.

## 2021-11-05 NOTE — H&P
Pulmonary and Sleep Medicine    Patricia Tim (:  1945) is a 78 y.o. male,New patient, here for evaluation of the following chief complaint(s):  New Patient (Lung nodules on PET scand and CT (Dr. Nazia Johnson))      ASSESSMENT/PLAN:  1. Lung mass  -     Case Request  2. Cough  3. Cigarette nicotine dependence without complication  4. Essential hypertension        Findings on the CT of the chest are highly suggestive for malignancy. Discussed bronchoscopy and biopsy the patient is in agreement. We will arrange for the procedure to be done in the morning. Reggie Schmidt [facility], [facility]    Return in about 2 weeks (around 2021). SUBJECTIVE/OBJECTIVE:  He is here for evaluation of lung mass. He developed a cough for which he had a CXR done and did show changes suspicious of lung mass. CT of the chest follow and then a PET CT that showed a hilar lung mass on the left that FDG avid and highly suggestive of malignancy. I was asked to see him for the above. He lost about 25 pounds. Prior to Visit Medications    Medication Sig Taking? Authorizing Provider   ezetimibe (ZETIA) 10 MG tablet Take 10 mg by mouth daily Yes Historical Provider, MD   metoprolol tartrate (LOPRESSOR) 25 MG tablet TAKE 1 TABLET BY MOUTH TWICE A DAY Yes DIVYA Rendon   montelukast (SINGULAIR) 10 MG tablet Take 10 mg by mouth nightly Yes Historical Provider, MD   gabapentin (NEURONTIN) 100 MG capsule Take 100 mg by mouth daily. Yes Historical Provider, MD   rosuvastatin (CRESTOR) 10 MG tablet Take 10 mg by mouth daily Yes Historical Provider, MD   calcium citrate (CALCITRATE) 950 MG tablet Take 1 tablet by mouth daily Yes Historical Provider, MD   metaxalone (SKELAXIN) 800 MG tablet Take 800 mg by mouth Yes Historical Provider, MD   traMADol (ULTRAM) 50 MG tablet Take 50 mg by mouth. . Yes Historical Provider, MD   nitroGLYCERIN (NITROSTAT) 0.4 MG SL tablet Place 1 tablet under the tongue every 5 minutes as needed for Chest pain As directed Yes DIVYA Brooks   omeprazole (PRILOSEC) 20 MG delayed release capsule Take 20 mg by mouth daily Yes Historical Provider, MD   Multiple Minerals-Vitamins (CALCIUM & VIT D3 BONE HEALTH PO) Take by mouth Yes Historical Provider, MD   Cholecalciferol (VITAMIN D3) 2000 UNITS CAPS Take by mouth Yes Historical Provider, MD   vitamin B-12 (CYANOCOBALAMIN) 1000 MCG tablet Take 1,000 mcg by mouth daily Yes Historical Provider, MD   aspirin 81 MG tablet Take 81 mg by mouth 2 times daily Yes Historical Provider, MD   folic acid (FOLVITE) 1 MG tablet Take 1 mg by mouth daily Yes Historical Provider, MD   Fluticasone Furoate-Vilanterol (BREO ELLIPTA) 200-25 MCG/INH AEPB Inhale into the lungs Yes Historical Provider, MD   azelastine HCl 0.15 % SOLN by Nasal route Yes Historical Provider, MD   cilostazol (PLETAL) 100 MG tablet Take 100 mg by mouth 2 times daily Yes Historical Provider, MD   Budesonide (PULMICORT FLEXHALER IN) Inhale into the lungs  Patient not taking: Reported on 11/4/2021  Historical Provider, MD        Review of Systems   Constitutional: Positive for appetite change. Negative for activity change, chills, diaphoresis and fatigue. HENT: Negative for congestion, dental problem, drooling, ear discharge, postnasal drip and rhinorrhea. Eyes: Negative for visual disturbance. Respiratory: Positive for cough. Negative for apnea, chest tightness, shortness of breath and wheezing. Gastrointestinal: Negative for abdominal distention, abdominal pain and anal bleeding. Endocrine: Negative for cold intolerance, heat intolerance and polydipsia. Genitourinary: Negative for difficulty urinating, dysuria, enuresis and flank pain. Musculoskeletal: Negative for arthralgias, back pain and gait problem. Allergic/Immunologic: Negative for environmental allergies. Neurological: Negative for dizziness, facial asymmetry, light-headedness and headaches.        Vitals:    11/04/21 1414   BP: 114/64   Pulse: 90   Temp: 97.6 °F (36.4 °C)   SpO2: 95%     Physical Exam  Vitals reviewed. Constitutional:       Appearance: Normal appearance. HENT:      Head: Normocephalic and atraumatic. Nose: Nose normal.   Eyes:      Extraocular Movements: Extraocular movements intact. Conjunctiva/sclera: Conjunctivae normal.   Cardiovascular:      Rate and Rhythm: Normal rate and regular rhythm. Heart sounds: No murmur heard. No friction rub. Pulmonary:      Effort: Pulmonary effort is normal. No respiratory distress. Breath sounds: Normal breath sounds. No stridor. No wheezing, rhonchi or rales. Abdominal:      General: There is no distension. Palpations: There is no mass. Tenderness: There is no abdominal tenderness. There is no guarding or rebound. Musculoskeletal:      Cervical back: Normal range of motion and neck supple. Neurological:      Mental Status: He is alert and oriented to person, place, and time. This note was generated used a voice recognition software. Errors in voice recognition may have occurred. An electronic signature was used to authenticate this note.     --Leila Brooks MD

## 2021-11-05 NOTE — H&P (VIEW-ONLY)
Pulmonary and Sleep Medicine    Nivia Hilton (:  1945) is a 78 y.o. male,New patient, here for evaluation of the following chief complaint(s):  New Patient (Lung nodules on PET scand and CT (Dr. Arianna Reyez))      ASSESSMENT/PLAN:  1. Lung mass  -     Case Request  2. Cough  3. Cigarette nicotine dependence without complication  4. Essential hypertension        Findings on the CT of the chest are highly suggestive for malignancy. Discussed bronchoscopy and biopsy the patient is in agreement. We will arrange for the procedure to be done in the morning. Urban Pickering [facility], [facility]    Return in about 2 weeks (around 2021). SUBJECTIVE/OBJECTIVE:  He is here for evaluation of lung mass. He developed a cough for which he had a CXR done and did show changes suspicious of lung mass. CT of the chest follow and then a PET CT that showed a hilar lung mass on the left that FDG avid and highly suggestive of malignancy. I was asked to see him for the above. He lost about 25 pounds. Prior to Visit Medications    Medication Sig Taking? Authorizing Provider   ezetimibe (ZETIA) 10 MG tablet Take 10 mg by mouth daily Yes Historical Provider, MD   metoprolol tartrate (LOPRESSOR) 25 MG tablet TAKE 1 TABLET BY MOUTH TWICE A DAY Yes DIVYA Velasquez   montelukast (SINGULAIR) 10 MG tablet Take 10 mg by mouth nightly Yes Historical Provider, MD   gabapentin (NEURONTIN) 100 MG capsule Take 100 mg by mouth daily. Yes Historical Provider, MD   rosuvastatin (CRESTOR) 10 MG tablet Take 10 mg by mouth daily Yes Historical Provider, MD   calcium citrate (CALCITRATE) 950 MG tablet Take 1 tablet by mouth daily Yes Historical Provider, MD   metaxalone (SKELAXIN) 800 MG tablet Take 800 mg by mouth Yes Historical Provider, MD   traMADol (ULTRAM) 50 MG tablet Take 50 mg by mouth. . Yes Historical Provider, MD   nitroGLYCERIN (NITROSTAT) 0.4 MG SL tablet Place 1 tablet under the tongue every 5 minutes as needed for Chest pain As directed Yes DIVYA Ochoa   omeprazole (PRILOSEC) 20 MG delayed release capsule Take 20 mg by mouth daily Yes Historical Provider, MD   Multiple Minerals-Vitamins (CALCIUM & VIT D3 BONE HEALTH PO) Take by mouth Yes Historical Provider, MD   Cholecalciferol (VITAMIN D3) 2000 UNITS CAPS Take by mouth Yes Historical Provider, MD   vitamin B-12 (CYANOCOBALAMIN) 1000 MCG tablet Take 1,000 mcg by mouth daily Yes Historical Provider, MD   aspirin 81 MG tablet Take 81 mg by mouth 2 times daily Yes Historical Provider, MD   folic acid (FOLVITE) 1 MG tablet Take 1 mg by mouth daily Yes Historical Provider, MD   Fluticasone Furoate-Vilanterol (BREO ELLIPTA) 200-25 MCG/INH AEPB Inhale into the lungs Yes Historical Provider, MD   azelastine HCl 0.15 % SOLN by Nasal route Yes Historical Provider, MD   cilostazol (PLETAL) 100 MG tablet Take 100 mg by mouth 2 times daily Yes Historical Provider, MD   Budesonide (PULMICORT FLEXHALER IN) Inhale into the lungs  Patient not taking: Reported on 11/4/2021  Historical Provider, MD        Review of Systems   Constitutional: Positive for appetite change. Negative for activity change, chills, diaphoresis and fatigue. HENT: Negative for congestion, dental problem, drooling, ear discharge, postnasal drip and rhinorrhea. Eyes: Negative for visual disturbance. Respiratory: Positive for cough. Negative for apnea, chest tightness, shortness of breath and wheezing. Gastrointestinal: Negative for abdominal distention, abdominal pain and anal bleeding. Endocrine: Negative for cold intolerance, heat intolerance and polydipsia. Genitourinary: Negative for difficulty urinating, dysuria, enuresis and flank pain. Musculoskeletal: Negative for arthralgias, back pain and gait problem. Allergic/Immunologic: Negative for environmental allergies. Neurological: Negative for dizziness, facial asymmetry, light-headedness and headaches.        Vitals:    11/04/21 1414   BP: 114/64   Pulse: 90   Temp: 97.6 °F (36.4 °C)   SpO2: 95%     Physical Exam  Vitals reviewed. Constitutional:       Appearance: Normal appearance. HENT:      Head: Normocephalic and atraumatic. Nose: Nose normal.   Eyes:      Extraocular Movements: Extraocular movements intact. Conjunctiva/sclera: Conjunctivae normal.   Cardiovascular:      Rate and Rhythm: Normal rate and regular rhythm. Heart sounds: No murmur heard. No friction rub. Pulmonary:      Effort: Pulmonary effort is normal. No respiratory distress. Breath sounds: Normal breath sounds. No stridor. No wheezing, rhonchi or rales. Abdominal:      General: There is no distension. Palpations: There is no mass. Tenderness: There is no abdominal tenderness. There is no guarding or rebound. Musculoskeletal:      Cervical back: Normal range of motion and neck supple. Neurological:      Mental Status: He is alert and oriented to person, place, and time. This note was generated used a voice recognition software. Errors in voice recognition may have occurred. An electronic signature was used to authenticate this note.     --Ulises Humphreys MD

## 2021-11-08 DIAGNOSIS — R91.8 LUNG MASS: Primary | ICD-10-CM

## 2021-11-10 DIAGNOSIS — R91.8 LUNG MASS: Primary | ICD-10-CM

## 2021-11-12 NOTE — PROGRESS NOTES
MEDICAL ONCOLOGY CONSULTATION    Pt Name: Carmen Beyer  MRN: [id number]  YOB: 1945  Date of evaluation: 11/15/2021    REASON FOR CONSULTATION:  Lung cancer  REQUESTING PHYSICIAN: Dr Bethany Alexander From:  patient and old medical records    HISTORY OF PRESENT ILLNESS:    Diagnosis  · Squamous cell carcinoma, Left lung, Nov 2021  · Invasive keratinizing  · [id number], stage III (??)    Treatment Summary  · To be determined    Cancer History  Francia Ojeda was first seen by me on 11/15/2021. He was referred by pulmonary at [facility] for a diagnosis of lung cancer. He developed complaints of cough. Chest x-ray was performed and showed a lung mass. Patient is a currently smoker. He lost about 25 pounds. · 9/28/21 CXR [facility]): Questionable mass density seen only on the lateral view with patchy density in left upper lobe. Further evaluation with computed tomography is recommended. COPD. · 10/4/21 CT chest [facility]): Mass lesion left hilum extending caudally along the mediastinum and inseparable from the mediastinum in this region. This is highly suspicious for malignancy. Consolidation and/or neoplastic mass left upper lobe as described above Emphysema. New right thyroid nodule, probably solid. 2 chronic cysts within the liver. · 10/22/21- PET scan [facility], [facility] [place]): Essentially, mass in the right side of the neck of the neck base measuring 2.8 cm x 2.4 cm. SUV 18.9. Also small focus soft tissue of the back at the base of the neck on the right. Lesion measured 9 mm. SUV 10.5. Large mass in the left hilum measures 5.1 x 4.2 cm (SUV 18.6). Extensive parenchymal abnormality with a high SUV uptake in the left upper lobe. Apical component with a small amount of cavitation measuring 2.9 x 2.6 cm with SUV 11.3.  · 11/5/21 Lung, left mainstem bronchus biopsy with touch imprint smears: Invasive keratinizing squamous cell carcinoma.  Lung, fine-needle aspiration of left hilar mass, ThinPrep and cell block: Keratinizing squamous cell carcinoma. Lung, left mainstem bronchial washing, ThinPrep and cell block: Keratinizing squamous cell carcinoma. · 11/15/2021-he was first seen by me. Recommended biopsy of right neck mass and also subcutaneous nodule right upper back. Discussed with ENT, Dr. Garrett Durbin.     Past Medical History:    Past Medical History:   Diagnosis Date    CAD (coronary artery disease)     RUFINO to LCX 1/13    Hyperlipidemia     Nicotine dependence, cigarettes, w unsp disorders 12/1/2017    Prostate cancer ([facility] [address]     PVD (peripheral vascular disease) ([facility] [address]     Tobacco abuse        Past Surgical History:    Past Surgical History:   Procedure Laterality Date    BRONCHOSCOPY Left 11/5/2021    BRONCHOSCOPY ENDOBRONCHIAL ULTRASOUND performed by Alexandrea Valadez MD at [facility]   [address]  02/02/2013    EF 65%, patent stent to LCX, no new occlusive disease    CATARACT REMOVAL WITH IMPLANT Bilateral 2014    CORONARY ANGIOPLASTY  01/28/2013    RUFINO to LCX    HERNIA REPAIR      HX VASCULAR STENT  2013    LUNG BIOPSY  11/05/2021    PROSTATE SURGERY      SHOULDER SURGERY Left        Social History:    Marital status:   Smoking status:Yes; 60 years; 5-6 a day  ETOH status:No  Resides:[place], [place]    Family History:   Family History   Problem Relation Age of Onset    Hypertension Mother     Stroke Mother     Cancer Brother 61        Lung    Cancer Brother 79        Prostate       Current Hospital Medications:    Current Outpatient Medications   Medication Sig Dispense Refill    LORazepam (ATIVAN) 0.5 MG tablet Take tablet before MRI Procedure 2 tablet 0    ezetimibe (ZETIA) 10 MG tablet Take 10 mg by mouth daily      metoprolol tartrate (LOPRESSOR) 25 MG tablet TAKE 1 TABLET BY MOUTH TWICE A  tablet 3    montelukast (SINGULAIR) 10 MG tablet Take 10 mg by mouth nightly      gabapentin (NEURONTIN) 100 MG capsule Take 100 mg by mouth daily.  rosuvastatin (CRESTOR) 10 MG tablet Take 10 mg by mouth daily      calcium citrate (CALCITRATE) 950 MG tablet Take 1 tablet by mouth daily      metaxalone (SKELAXIN) 800 MG tablet Take 800 mg by mouth      traMADol (ULTRAM) 50 MG tablet Take 50 mg by mouth. Melanieteantoni Damien nitroGLYCERIN (NITROSTAT) 0.4 MG SL tablet Place 1 tablet under the tongue every 5 minutes as needed for Chest pain As directed 25 tablet 5    omeprazole (PRILOSEC) 20 MG delayed release capsule Take 20 mg by mouth daily      Multiple Minerals-Vitamins (CALCIUM & VIT D3 BONE HEALTH PO) Take by mouth      Cholecalciferol (VITAMIN D3) 2000 UNITS CAPS Take by mouth      vitamin B-12 (CYANOCOBALAMIN) 1000 MCG tablet Take 1,000 mcg by mouth daily      aspirin 81 MG tablet Take 81 mg by mouth 2 times daily      folic acid (FOLVITE) 1 MG tablet Take 1 mg by mouth daily      Fluticasone Furoate-Vilanterol (BREO ELLIPTA) 200-25 MCG/INH AEPB Inhale into the lungs      azelastine HCl 0.15 % SOLN by Nasal route      cilostazol (PLETAL) 100 MG tablet Take 100 mg by mouth 2 times daily       No current facility-administered medications for this visit. Allergies: Allergies   Allergen Reactions    Zithromax [Azithromycin]      abd pain.  Penicillins Rash     And pain      Shellfish-Derived Products Rash         Subjective   REVIEW OF SYSTEMS:   CONSTITUTIONAL: no fever, no night sweats, fatigue, unintentional weight loss, night sweats;   HEENT: no blurring of vision, no double vision, no hearing difficulty, no tinnitus, no ulceration, no dysplasia, no epistaxis;  LUNGS:  Cough,wheezing, no hemoptysis, no wheeze,  shortness of breath;  CARDIOVASCULAR: no palpitation, no chest pain, shortness of breath;  GI: heartburn, no abdominal pain, no nausea, no vomiting, no diarrhea, no constipation;  LLUVIA: no dysuria, no hematuria, no frequency or urgency, no nephrolithiasis;  MUSCULOSKELETAL: mid back pain,  joint pain, no swelling, no stiffness;  ENDOCRINE: no polyuria, no polydipsia, no cold or heat intolerance;  HEMATOLOGY: no easy bruising or bleeding, no history of clotting disorder;  DERMATOLOGY: no skin rash, no eczema, no pruritus;  PSYCHIATRY: no depression, no anxiety, no panic attacks, no suicidal ideation, no homicidal ideation;  NEUROLOGY: new onset headaches,no syncope, no seizures, no numbness or tingling of hands, no numbness or tingling of feet, no paresis;    Objective   /60   Pulse 89   Ht 5' 7\" (1.702 m)   Wt 142 lb (64.4 kg)   SpO2 93%   BMI 22.24 kg/m²     PHYSICAL EXAM:  CONSTITUTIONAL: Alert, appropriate, no acute distress  EYES: Non icteric, EOM intact, pupils equal round   ENT:congestion, Mucus membranes moist, no oral pharyngeal lesions, external inspection of ears and nose are normal  NECK: Supple, no masses. No palpable thyroid mass  CHEST/LUNGS: CTA bilaterally, normal respiratory effort   CARDIOVASCULAR: RRR, no murmurs. No lower extremity edema  ABDOMEN: soft non-tender, active bowel sounds, no HSM. No palpable masses  EXTREMITIES: warm, full ROM in all 4 extremities, no focal weakness. SKIN: warm, dry with no rashes or lesions  LYMPH: No cervical, clavicular, axillary, or inguinal lymphadenopathy  NEUROLOGIC: follows commands, non focal   PSYCH: mood and affect appropriate. Alert and oriented to time, place, person      LABORATORY RESULTS REVIEWED/ANALYZED BY ME:  11/15/21 CBC  WBC 12.26  HGB 14    Neut 9.22    RADIOLOGY STUDIES REVIEWED BY ME:  [facility]Large left hilar mass with prominent metabolic activity. Consistent with malignancy. Extensive abnormality in the left upper lobe. This has the appearance of a relatively widespread infiltrate. Not typical of malignancy. An inflammatory process such as fungal disease or even tuberculosis could be responsible for this degree of increased activity as well but tumor is the most likely explanation. There is also a mass in the base of the neck on the right suspicious for malignancy. This appears to either arise from or invade the right thyroid gland. This could represent a second primary tumor or large lymph node. Small focus of activity with nodular implant in the subcutaneous fat at the base of the right side of the neck suspicious for   metastatic disease. ASSESSMENT:    Orders Placed This Encounter   Procedures    MRI BRAIN W WO CONTRAST     Standing Status:   Future     Standing Expiration Date:   11/15/2022     Scheduling Instructions:      Please refer to [facility] request straight board     Order Specific Question:   Reason for exam:     Answer:   New onset headaches    External Referral To ENT     Referral Priority:   Routine     Referral Type:   Eval and Treat     Referral Reason:   Specialty Services Required     Requested Specialty:   Otolaryngology     Number of Visits Requested:   1      Sherren Shadow was seen today for new patient. Diagnoses and all orders for this visit:    Lung mass    Care plan discussed with patient    Thyroid nodule  -     External Referral To ENT    New onset of headaches  -     MRI BRAIN W WO CONTRAST; Future    Anxiety  -     LORazepam (ATIVAN) 0.5 MG tablet; Take tablet before MRI Procedure    Non-small cell lung cancer, SCC [id number]?? The patient was counseled today about diagnosis, staging, prognosis, diagnostic tests, medications, side effects and disease management. Essentially, findings of squamous cell non-small cell lung cancer left lung. Abnormal SUV uptake right neck and also subcutaneous nodule at the base of the neck on the right and also a small focus of activity with nodular implant in the subcutaneous fat at the base of the right side of the neck. Discussed with ENT about biopsying both sites to help determine stage.   If sample turns out to be metastatic squamous cell then this is stage IV disease and therefore would recommend palliative chemoimmunotherapy. Otherwise, would recommend chemoradiation. Plan  ENT will see the patient today, discussed with Dr. Nishant Falcon  Brain MRI  RTC 2 weeks to determine treatment course  We will check PD-L1    New onset headaches-patient has complained of headaches for the last few weeks. This is new for him. Recommend a brain MRI. PLAN:  · Refer to  ENT at [facility]  · MRI Brain w/w/o Contrast  due to new onset headaches   · Recommend Ativan . 5mg for anxiety before MRI Procedure-script sent   · RTC with MD 6 weeks  after MRI  · Check PD-L1 on tumor specimen        Chaparrita WALSH am scribing for Fannie Knapp MD. Electronically signed by Chaparrita Barros MA on 11/15/2021 at 11:25 AM CST. I, Dr Raul Stevens, personally performed the services described in this documentation as scribed by Chaparrita Barros MA in my presence and is both accurate and complete. I have seen, examined and reviewed this patient medication list, appropriate labs and imaging studies. I reviewed relevant medical records and others physicians notes. I discussed the plans of care with the patient. I answered all the questions to the patients satisfaction. I have also reviewed the chief complaint (CC) and part of the history (History of Present Illness (HPI), Past Family Social History [facility]), or Review of Systems (ROS) and made changes when appropriated.        (Please note that portions of this note were completed with a voice recognition program. Efforts were made to edit the dictations but occasionally words are mis-transcribed.)    Electronically signed by Fannie Knapp MD on 11/15/2021 at 11:53 AM      The total time (65 minutes) I spent to see the patient today includes at least one or more of the following: preparing to see the patient by reviewing prior tests, prior notes or other relevant information, performing appropriate independent examination and evaluation, counseling, ordering of medications, tests or procedures, referrals, communicating with other healthcare professionals when appropriated to coordinate care, documenting clinic information in the electronic medical record or other health records, independently interpreting results of tests, managing test results and communicating the results to the patient/family or caregiver.

## 2021-11-15 ENCOUNTER — OFFICE VISIT (OUTPATIENT)
Dept: HEMATOLOGY | Age: 76
End: 2021-11-15
Payer: OTHER GOVERNMENT

## 2021-11-15 ENCOUNTER — HOSPITAL ENCOUNTER (OUTPATIENT)
Dept: INFUSION THERAPY | Age: 76
Discharge: HOME | End: 2021-11-15
Payer: OTHER GOVERNMENT

## 2021-11-15 ENCOUNTER — TELEPHONE (OUTPATIENT)
Dept: HEMATOLOGY | Age: 76
End: 2021-11-15

## 2021-11-15 VITALS
OXYGEN SATURATION: 93 % | DIASTOLIC BLOOD PRESSURE: 60 MMHG | HEIGHT: 67 IN | SYSTOLIC BLOOD PRESSURE: 102 MMHG | HEART RATE: 89 BPM | WEIGHT: 142 LBS | BODY MASS INDEX: 22.29 KG/M2

## 2021-11-15 DIAGNOSIS — E04.1 THYROID NODULE: ICD-10-CM

## 2021-11-15 DIAGNOSIS — F41.9 ANXIETY: ICD-10-CM

## 2021-11-15 DIAGNOSIS — R91.8 LUNG MASS: ICD-10-CM

## 2021-11-15 DIAGNOSIS — Z71.89 CARE PLAN DISCUSSED WITH PATIENT: ICD-10-CM

## 2021-11-15 DIAGNOSIS — R51.9 NEW ONSET OF HEADACHES: ICD-10-CM

## 2021-11-15 DIAGNOSIS — R91.8 LUNG MASS: Primary | ICD-10-CM

## 2021-11-15 PROBLEM — M79.89 MASS OF SOFT TISSUE OF NECK: Status: ACTIVE | Noted: 2021-01-01

## 2021-11-15 PROBLEM — C34.92 MALIGNANT NEOPLASM OF LEFT LUNG (HCC): Status: ACTIVE | Noted: 2021-01-01

## 2021-11-15 PROBLEM — R22.1 MASS OF RIGHT SIDE OF NECK: Status: ACTIVE | Noted: 2021-01-01

## 2021-11-15 LAB
BASOPHILS ABSOLUTE: 0.03 K/UL (ref 0.01–0.08)
BASOPHILS RELATIVE PERCENT: 0.2 % (ref 0.1–1.2)
EOSINOPHILS ABSOLUTE: 0.16 K/UL (ref 0.04–0.54)
EOSINOPHILS RELATIVE PERCENT: 1.3 % (ref 0.7–7)
HCT VFR BLD CALC: 42 % (ref 40.1–51)
HEMOGLOBIN: 14 G/DL (ref 13.7–17.5)
LYMPHOCYTES ABSOLUTE: 1.84 K/UL (ref 1.18–3.74)
LYMPHOCYTES RELATIVE PERCENT: 15 % (ref 19.3–53.1)
MCH RBC QN AUTO: 31.8 PG (ref 25.7–32.2)
MCHC RBC AUTO-ENTMCNC: 33.3 G/DL (ref 32.3–36.5)
MCV RBC AUTO: 95.5 FL (ref 79–92.2)
MONOCYTES ABSOLUTE: 1.01 K/UL (ref 0.24–0.82)
MONOCYTES RELATIVE PERCENT: 8.2 % (ref 4.7–12.5)
NEUTROPHILS ABSOLUTE: 9.22 K/UL (ref 1.56–6.13)
NEUTROPHILS RELATIVE PERCENT: 75.3 % (ref 34–71.1)
PDW BLD-RTO: 13.4 % (ref 11.6–14.4)
PLATELET # BLD: 163 K/UL (ref 163–337)
PMV BLD AUTO: 10.6 FL (ref 7.4–10.4)
RBC # BLD: 4.4 M/UL (ref 4.63–6.08)
WBC # BLD: 12.26 K/UL (ref 4.23–9.07)

## 2021-11-15 PROCEDURE — 99205 OFFICE O/P NEW HI 60 MIN: CPT | Performed by: INTERNAL MEDICINE

## 2021-11-15 PROCEDURE — 99212 OFFICE O/P EST SF 10 MIN: CPT

## 2021-11-15 PROCEDURE — 36415 COLL VENOUS BLD VENIPUNCTURE: CPT

## 2021-11-15 PROCEDURE — 85025 COMPLETE CBC W/AUTO DIFF WBC: CPT

## 2021-11-15 RX ORDER — LORAZEPAM 0.5 MG/1
TABLET ORAL
Qty: 2 TABLET | Refills: 0 | Status: SHIPPED | OUTPATIENT
Start: 2021-11-15 | End: 2021-11-16

## 2021-11-15 NOTE — PROGRESS NOTES
OPERATIVE NOTE:  Chucky Jamison    DATE OF PROCEDURE: 11/15/2021    PROCEDURE:   Flexible Fiberoptic Laryngoscopy    ANESTHESIA:  None    REASON FOR PROCEDURE:  Procedure was recommend for suspicious clinical behavior and history of a lung mass with a right neck mass associated with the inferior aspect of the right thyroid  Risks, benefits and alternatives were discussed.      DETAILS of OPERATION:  The patient was seated in the exam chair.  A flexible fiberoptic laryngoscopy was performed through the oral cavity.  The scope was introduced into the oral cavity and directed to the level of the glottis, examining the structures of the oropharynx, base of tongue, vallecula, supraglottic larynx, glottic larynx, and hypopharynx.      FINDINGS:  Mucosal surfaces:   The mucosal surfaces demonstrated normal mucosa surfaces with mild inflammation    Base of tongue:  The base of tongue was found to have no mass or lesion.    Epiglottis:  The epiglottis was found to have  no mass or lesion.    Aryepligottic fold:  The AE folds were found to have no mass or lesion.    False Vocal Fold:  The false cords were found to have no mass or lesion.    True Vocal Cord:  The true vocal cords were found to have no mass or lesion.  Both true vocal cords adduct and abduct normally.    Arytenoid:   The arytenoids were found to have no mass or lesion.    Hypopharynx:  The hypopharynx was found to have no mass or lesion.    The patient tolerated procedure well.

## 2021-11-15 NOTE — TELEPHONE ENCOUNTER
Patient does not have a voicemail set up. MRI Brain ---with straight board has been scheduled 11/30/2021 @ [facility]--- entering in Dr. Shoshana Damon 2 going to main registration.  Npo 4 hours prior

## 2021-11-15 NOTE — PROGRESS NOTES
YOB: 1945  Location: [facility]  Location Address: [address] [place] [address], [facility], [address] [place] [address]  Location Phone: [phone]    Chief Complaint   Patient presents with   • abnormal pet       History of Present Illness  Chucky Jamison is a 76 y.o. male.  Chucky Jamison is here for evaluation of ENT complaints. The patient has had a PET/CT due to recent diagnosis of lung mass. The PET scan indicated a mass at the mass of base of the neck on the right suspicious for malignancy. It appears to arise from or invade the right thyroid gland measuring SUV of 18.9. The patient also has a small focus of activity with nodular implant in the subcutaneous fat at the base of the right side or the neck suspicious for metastatic disease SUV measuring 10.5. Patient has nasal congestion, nasal drainage, postnasal drip, fatigue, cough, weight loss, sore throat, night sweats, and headache on the left side of the face. He denies neck swelling and tenderness, dysphagia, hoarseness and insomnia. Patient has not had treatment for his lung mass.                                Past Medical History:   Diagnosis Date   • COPD (chronic obstructive pulmonary disease) (HCC)    • Emphysema lung (HCC)    • Heart disease    • Hypercholesteremia    • Hypertension    • Lung cancer (HCC)    • Prostate cancer (HCC)        Past Surgical History:   Procedure Laterality Date   • CORONARY STENT PLACEMENT     • HERNIA REPAIR     • LUNG BIOPSY     • PROSTATE SURGERY         Outpatient Medications Marked as Taking for the 11/15/21 encounter (Office Visit) with Blair Walsh MD   Medication Sig Dispense Refill   • aspirin 81 MG tablet Take 81 mg by mouth.     • azelastine (ASTELIN) 0.1 % nasal spray 1 spray into each nostril.     • BREO ELLIPTA 200-25 MCG/INH aerosol powder  INHALE 1 PUFF DAILY  1   • calcium carbonate (TUMS) 500 MG chewable tablet Chew 1 tablet Daily.     • Cholecalciferol (VITAMIN D3) 2000 UNITS capsule Take 2  tablets by mouth.     • cilostazol (PLETAL) 100 MG tablet TAKE 1 TABLET BY MOUTH TWICE A DAY 30 MINUTES BEFORE OR 2 HOURS AFTER BREAKFAST & SUPPER  1   • Cyanocobalamin (VITAMIN B12) 1000 MCG tablet controlled-release Take 1,000 mcg by mouth.     • ezetimibe (ZETIA) 10 MG tablet Take 10 mg by mouth Daily.     • folic acid (FOLVITE) 1 MG tablet Take 1 mg by mouth Daily.     • gabapentin (NEURONTIN) 100 MG capsule Take 100 mg by mouth 3 (Three) Times a Day.     • metaxalone (SKELAXIN) 800 MG tablet Take 1 tablet by mouth 2 (Two) Times a Day As Needed for muscle spasms. 60 tablet 0   • metoprolol tartrate (LOPRESSOR) 25 MG tablet TAKE 1 TABLET BY MOUTH TWICE A DAY  3   • montelukast (SINGULAIR) 10 MG tablet Take 10 mg by mouth.     • multivitamin with minerals tablet tablet Take 1 tablet by mouth Daily.     • nitroglycerin (NITROSTAT) 0.4 MG SL tablet PLACE 1 TAB UNDER TONGUE EVERY 5 MIN FOR UP TO 3 DOSES FOR CHEST PAIN **SEEK ER IF NO RELIEF**  5   • omeprazole (priLOSEC) 20 MG capsule Take 20 mg by mouth.     • rosuvastatin (CRESTOR) 20 MG tablet Take 20 mg by mouth Daily.     • traMADol (ULTRAM) 50 MG tablet Take 1 tablet by mouth 3 (Three) Times a Day As Needed for moderate pain (4-6). 90 tablet 0       Azithromycin, Penicillins, and Shellfish-derived products    Family History   Problem Relation Age of Onset   • Heart disease Mother    • No Known Problems Father    • Cancer Brother    • Prostate cancer Brother        Social History     Socioeconomic History   • Marital status:    Tobacco Use   • Smoking status: Current Some Day Smoker     Packs/day: 0.50   • Smokeless tobacco: Never Used   • Tobacco comment: Trying to cut back   Substance and Sexual Activity   • Alcohol use: No   • Drug use: No   • Sexual activity: Defer       Review of Systems   Constitutional: Positive for fatigue and unexpected weight change.   HENT: Positive for congestion, postnasal drip, rhinorrhea, sinus pressure and sore throat.     Eyes: Negative.    Respiratory: Positive for cough.    Cardiovascular: Negative.    Gastrointestinal: Negative.    Endocrine:        Night sweats   Genitourinary: Negative.    Musculoskeletal: Negative.    Skin: Negative.    Allergic/Immunologic: Negative.    Neurological:        Left facial headache   Hematological: Negative.    Psychiatric/Behavioral: Negative.        Vitals:    11/15/21 1514   BP: 119/62   Pulse: 84   Resp: 16   Temp: 98.4 °F (36.9 °C)       Body mass index is 22.55 kg/m².    Objective     Physical Exam  CONSTITUTIONAL: well nourished, well-developed, alert, oriented, in no acute distress     COMMUNICATION AND VOICE: able to communicate normally, normal voice quality    HEAD: normocephalic, no lesions, atraumatic, no tenderness, no masses     FACE: appearance normal, no lesions, no tenderness, no deformities, facial motion symmetric    EYES: ocular motility normal, eyelids normal, orbits normal, no proptosis, conjunctiva normal , pupils equal, round     EARS:  Hearing: hearing to conversational voice intact bilaterally   External Ears: normal bilaterally, no lesions    NOSE:  External Nose: external nasal structure normal, no tenderness on palpation, no nasal discharge, no lesions, no evidence of trauma, nostrils patent     ORAL:  Lips: upper and lower lips without lesion     LARYNX:  See endoscopy    NECK:  Inspection and Palpation: neck appearance normal, no masses or tenderness is noted anteriorly but in the posterior right base of neck a 1.5 x 2 cm nodular mass is appreciated overlying the inferior aspect of the trapezius.    CHEST/RESPIRATORY: normal respiratory effort     CARDIOVASCULAR: no cyanosis or edema     NEUROLOGICAL/PSYCHIATRIC: oriented to time, place and person, mood normal, affect appropriate, CN II-XII intact grossly    Assessment/Plan   Diagnoses and all orders for this visit:    1. Mass of right side of neck (Primary)  -     COVID PRE-OP / PRE-PROCEDURE SCREENING ORDER (NO  ISOLATION) - Swab, Nasopharynx; Future  -     Case Request; Standing  -     Comprehensive Metabolic Panel; Future  -     ECG 12 Lead; Future  -     XR Chest 2 View; Future    2. Mass of soft tissue of neck  -     COVID PRE-OP / PRE-PROCEDURE SCREENING ORDER (NO ISOLATION) - Swab, Nasopharynx; Future  -     Case Request; Standing  -     Comprehensive Metabolic Panel; Future  -     ECG 12 Lead; Future  -     XR Chest 2 View; Future    3. Malignant neoplasm of hilus of left lung (HCC)  -     COVID PRE-OP / PRE-PROCEDURE SCREENING ORDER (NO ISOLATION) - Swab, Nasopharynx; Future  -     Case Request; Standing  -     Comprehensive Metabolic Panel; Future  -     ECG 12 Lead; Future  -     XR Chest 2 View; Future    Other orders  -     Follow Anesthesia Guidelines / Protocol; Future  -     Obtain Informed Consent; Future      Excisional biopsy of right posterior neck mass and excisional biopsy of right anterior neck mass (related to the inferior right thyroid lobe) (Right)  Orders Placed This Encounter   Procedures   • COVID PRE-OP / PRE-PROCEDURE SCREENING ORDER (NO ISOLATION) - Swab, Nasopharynx     Standing Status:   Future     Standing Expiration Date:   11/15/2022     Order Specific Question:   Please select your location:     Answer:    [place]     Order Specific Question:   COVID Screening Order:     Answer:   In-House: APTIMA PANTHER, 24 HR TAT [[id number]]     Order Specific Question:   Employed in healthcare setting?     Answer:   No     Order Specific Question:   Symptomatic for COVID-19 as defined by CDC?     Answer:   No     Order Specific Question:   Hospitalized for COVID-19?     Answer:   No     Order Specific Question:   Admitted to ICU for COVID-19?     Answer:   No     Order Specific Question:   Resident in a congregate (group) care setting?     Answer:   No     Order Specific Question:   Release to patient     Answer:   Immediate   • XR Chest 2 View     Standing Status:   Future     Standing Expiration  "Date:   11/15/2022     Order Specific Question:   Reason for Exam:     Answer:   Excisional biopsy of right posterior neck mass and excisional biopsy of right anterior neck mass (related to the inferior right thyroid lobe)     Order Specific Question:   Release to patient     Answer:   Immediate   • Comprehensive Metabolic Panel     Standing Status:   Future     Standing Expiration Date:   11/15/2022     Order Specific Question:   Release to patient     Answer:   Immediate   • Follow Anesthesia Guidelines / Protocol     Standing Status:   Future   • Obtain Informed Consent     Standing Status:   Future     Order Specific Question:   Informed Consent Given For     Answer:   Excisional biopsy of right posterior neck mass and excisional biopsy of right anterior neck mass (related to the inferior right thyroid lobe)   • ECG 12 Lead     Standing Status:   Future     Standing Expiration Date:   11/15/2022     Order Specific Question:   Reason for Exam:     Answer:   Excisional biopsy of right posterior neck mass and excisional biopsy of right anterior neck mass (related to the inferior right thyroid lobe)     Order Specific Question:   Release to patient     Answer:   Immediate     Return for postop.       Patient Instructions   Excisional biopsy of right posterior neck mass and excisional biopsy of right anterior neck mass (related to the inferior right thyroid lobe)    The risks, benefits and options of the procedure were explained to the patient. The possiblity of a persistent cosmetic defect as well as a \"flap\" or a graft to close the defect was discussed. The patient was instructed to stop all aspirin, NSAIDs and VIT E etc.  If appropriate, clearance with primary MD or specialist will be obtained preoperatively.  The patient was scheduled for a LOCAL/MAC Anesthesia with a frozen section for margin control.    For instructions on the proper use, care and disposal of controled substances, ask you doctor or refer to the " [place] Board of Medicine website: http://kbml.[place].gov/hb1/Pages/Considerations-For-Patient-Education.aspx

## 2021-11-15 NOTE — PATIENT INSTRUCTIONS
"Excisional biopsy of right posterior neck mass and excisional biopsy of right anterior neck mass (related to the inferior right thyroid lobe)    The risks, benefits and options of the procedure were explained to the patient. The possiblity of a persistent cosmetic defect as well as a \"flap\" or a graft to close the defect was discussed. The patient was instructed to stop all aspirin, NSAIDs and VIT E etc.  If appropriate, clearance with primary MD or specialist will be obtained preoperatively.  The patient was scheduled for a LOCAL/MAC Anesthesia with a frozen section for margin control.    For instructions on the proper use, care and disposal of controled substances, ask you doctor or refer to the [place] Board of Medicine website: http://kbml.[place].gov/hb1/Pages/Considerations-For-Patient-Education.aspx    "

## 2021-11-15 NOTE — H&P (VIEW-ONLY)
YOB: 1945  Location: [facility]  Location Address: [address] [place] [address], [facility], [address] [place] [address]  Location Phone: [phone]    Chief Complaint   Patient presents with   • abnormal pet       History of Present Illness  Chucky Jamison is a 76 y.o. male.  Chucky Jamison is here for evaluation of ENT complaints. The patient has had a PET/CT due to recent diagnosis of lung mass. The PET scan indicated a mass at the mass of base of the neck on the right suspicious for malignancy. It appears to arise from or invade the right thyroid gland measuring SUV of 18.9. The patient also has a small focus of activity with nodular implant in the subcutaneous fat at the base of the right side or the neck suspicious for metastatic disease SUV measuring 10.5. Patient has nasal congestion, nasal drainage, postnasal drip, fatigue, cough, weight loss, sore throat, night sweats, and headache on the left side of the face. He denies neck swelling and tenderness, dysphagia, hoarseness and insomnia. Patient has not had treatment for his lung mass.                                Past Medical History:   Diagnosis Date   • COPD (chronic obstructive pulmonary disease) (HCC)    • Emphysema lung (HCC)    • Heart disease    • Hypercholesteremia    • Hypertension    • Lung cancer (HCC)    • Prostate cancer (HCC)        Past Surgical History:   Procedure Laterality Date   • CORONARY STENT PLACEMENT     • HERNIA REPAIR     • LUNG BIOPSY     • PROSTATE SURGERY         Outpatient Medications Marked as Taking for the 11/15/21 encounter (Office Visit) with Blair Walsh MD   Medication Sig Dispense Refill   • aspirin 81 MG tablet Take 81 mg by mouth.     • azelastine (ASTELIN) 0.1 % nasal spray 1 spray into each nostril.     • BREO ELLIPTA 200-25 MCG/INH aerosol powder  INHALE 1 PUFF DAILY  1   • calcium carbonate (TUMS) 500 MG chewable tablet Chew 1 tablet Daily.     • Cholecalciferol (VITAMIN D3) 2000 UNITS capsule Take 2  tablets by mouth.     • cilostazol (PLETAL) 100 MG tablet TAKE 1 TABLET BY MOUTH TWICE A DAY 30 MINUTES BEFORE OR 2 HOURS AFTER BREAKFAST & SUPPER  1   • Cyanocobalamin (VITAMIN B12) 1000 MCG tablet controlled-release Take 1,000 mcg by mouth.     • ezetimibe (ZETIA) 10 MG tablet Take 10 mg by mouth Daily.     • folic acid (FOLVITE) 1 MG tablet Take 1 mg by mouth Daily.     • gabapentin (NEURONTIN) 100 MG capsule Take 100 mg by mouth 3 (Three) Times a Day.     • metaxalone (SKELAXIN) 800 MG tablet Take 1 tablet by mouth 2 (Two) Times a Day As Needed for muscle spasms. 60 tablet 0   • metoprolol tartrate (LOPRESSOR) 25 MG tablet TAKE 1 TABLET BY MOUTH TWICE A DAY  3   • montelukast (SINGULAIR) 10 MG tablet Take 10 mg by mouth.     • multivitamin with minerals tablet tablet Take 1 tablet by mouth Daily.     • nitroglycerin (NITROSTAT) 0.4 MG SL tablet PLACE 1 TAB UNDER TONGUE EVERY 5 MIN FOR UP TO 3 DOSES FOR CHEST PAIN **SEEK ER IF NO RELIEF**  5   • omeprazole (priLOSEC) 20 MG capsule Take 20 mg by mouth.     • rosuvastatin (CRESTOR) 20 MG tablet Take 20 mg by mouth Daily.     • traMADol (ULTRAM) 50 MG tablet Take 1 tablet by mouth 3 (Three) Times a Day As Needed for moderate pain (4-6). 90 tablet 0       Azithromycin, Penicillins, and Shellfish-derived products    Family History   Problem Relation Age of Onset   • Heart disease Mother    • No Known Problems Father    • Cancer Brother    • Prostate cancer Brother        Social History     Socioeconomic History   • Marital status:    Tobacco Use   • Smoking status: Current Some Day Smoker     Packs/day: 0.50   • Smokeless tobacco: Never Used   • Tobacco comment: Trying to cut back   Substance and Sexual Activity   • Alcohol use: No   • Drug use: No   • Sexual activity: Defer       Review of Systems   Constitutional: Positive for fatigue and unexpected weight change.   HENT: Positive for congestion, postnasal drip, rhinorrhea, sinus pressure and sore throat.     Eyes: Negative.    Respiratory: Positive for cough.    Cardiovascular: Negative.    Gastrointestinal: Negative.    Endocrine:        Night sweats   Genitourinary: Negative.    Musculoskeletal: Negative.    Skin: Negative.    Allergic/Immunologic: Negative.    Neurological:        Left facial headache   Hematological: Negative.    Psychiatric/Behavioral: Negative.        Vitals:    11/15/21 1514   BP: 119/62   Pulse: 84   Resp: 16   Temp: 98.4 °F (36.9 °C)       Body mass index is 22.55 kg/m².    Objective     Physical Exam  CONSTITUTIONAL: well nourished, well-developed, alert, oriented, in no acute distress     COMMUNICATION AND VOICE: able to communicate normally, normal voice quality    HEAD: normocephalic, no lesions, atraumatic, no tenderness, no masses     FACE: appearance normal, no lesions, no tenderness, no deformities, facial motion symmetric    EYES: ocular motility normal, eyelids normal, orbits normal, no proptosis, conjunctiva normal , pupils equal, round     EARS:  Hearing: hearing to conversational voice intact bilaterally   External Ears: normal bilaterally, no lesions    NOSE:  External Nose: external nasal structure normal, no tenderness on palpation, no nasal discharge, no lesions, no evidence of trauma, nostrils patent     ORAL:  Lips: upper and lower lips without lesion     LARYNX:  See endoscopy    NECK:  Inspection and Palpation: neck appearance normal, no masses or tenderness is noted anteriorly but in the posterior right base of neck a 1.5 x 2 cm nodular mass is appreciated overlying the inferior aspect of the trapezius.    CHEST/RESPIRATORY: normal respiratory effort     CARDIOVASCULAR: no cyanosis or edema     NEUROLOGICAL/PSYCHIATRIC: oriented to time, place and person, mood normal, affect appropriate, CN II-XII intact grossly    Assessment/Plan   Diagnoses and all orders for this visit:    1. Mass of right side of neck (Primary)  -     COVID PRE-OP / PRE-PROCEDURE SCREENING ORDER (NO  ISOLATION) - Swab, Nasopharynx; Future  -     Case Request; Standing  -     Comprehensive Metabolic Panel; Future  -     ECG 12 Lead; Future  -     XR Chest 2 View; Future    2. Mass of soft tissue of neck  -     COVID PRE-OP / PRE-PROCEDURE SCREENING ORDER (NO ISOLATION) - Swab, Nasopharynx; Future  -     Case Request; Standing  -     Comprehensive Metabolic Panel; Future  -     ECG 12 Lead; Future  -     XR Chest 2 View; Future    3. Malignant neoplasm of hilus of left lung (HCC)  -     COVID PRE-OP / PRE-PROCEDURE SCREENING ORDER (NO ISOLATION) - Swab, Nasopharynx; Future  -     Case Request; Standing  -     Comprehensive Metabolic Panel; Future  -     ECG 12 Lead; Future  -     XR Chest 2 View; Future    Other orders  -     Follow Anesthesia Guidelines / Protocol; Future  -     Obtain Informed Consent; Future      Excisional biopsy of right posterior neck mass and excisional biopsy of right anterior neck mass (related to the inferior right thyroid lobe) (Right)  Orders Placed This Encounter   Procedures   • COVID PRE-OP / PRE-PROCEDURE SCREENING ORDER (NO ISOLATION) - Swab, Nasopharynx     Standing Status:   Future     Standing Expiration Date:   11/15/2022     Order Specific Question:   Please select your location:     Answer:    [place]     Order Specific Question:   COVID Screening Order:     Answer:   In-House: APTIMA PANTHER, 24 HR TAT [[id number]]     Order Specific Question:   Employed in healthcare setting?     Answer:   No     Order Specific Question:   Symptomatic for COVID-19 as defined by CDC?     Answer:   No     Order Specific Question:   Hospitalized for COVID-19?     Answer:   No     Order Specific Question:   Admitted to ICU for COVID-19?     Answer:   No     Order Specific Question:   Resident in a congregate (group) care setting?     Answer:   No     Order Specific Question:   Release to patient     Answer:   Immediate   • XR Chest 2 View     Standing Status:   Future     Standing Expiration  "Date:   11/15/2022     Order Specific Question:   Reason for Exam:     Answer:   Excisional biopsy of right posterior neck mass and excisional biopsy of right anterior neck mass (related to the inferior right thyroid lobe)     Order Specific Question:   Release to patient     Answer:   Immediate   • Comprehensive Metabolic Panel     Standing Status:   Future     Standing Expiration Date:   11/15/2022     Order Specific Question:   Release to patient     Answer:   Immediate   • Follow Anesthesia Guidelines / Protocol     Standing Status:   Future   • Obtain Informed Consent     Standing Status:   Future     Order Specific Question:   Informed Consent Given For     Answer:   Excisional biopsy of right posterior neck mass and excisional biopsy of right anterior neck mass (related to the inferior right thyroid lobe)   • ECG 12 Lead     Standing Status:   Future     Standing Expiration Date:   11/15/2022     Order Specific Question:   Reason for Exam:     Answer:   Excisional biopsy of right posterior neck mass and excisional biopsy of right anterior neck mass (related to the inferior right thyroid lobe)     Order Specific Question:   Release to patient     Answer:   Immediate     Return for postop.       Patient Instructions   Excisional biopsy of right posterior neck mass and excisional biopsy of right anterior neck mass (related to the inferior right thyroid lobe)    The risks, benefits and options of the procedure were explained to the patient. The possiblity of a persistent cosmetic defect as well as a \"flap\" or a graft to close the defect was discussed. The patient was instructed to stop all aspirin, NSAIDs and VIT E etc.  If appropriate, clearance with primary MD or specialist will be obtained preoperatively.  The patient was scheduled for a LOCAL/MAC Anesthesia with a frozen section for margin control.    For instructions on the proper use, care and disposal of controled substances, ask you doctor or refer to the " [place] Board of Medicine website: http://kbml.ky.gov/hb1/Pages/Considerations-For-Patient-Education.aspx

## 2021-11-17 ENCOUNTER — TELEPHONE (OUTPATIENT)
Dept: HEMATOLOGY | Age: 76
End: 2021-11-17

## 2021-11-17 PROBLEM — E07.89 THYROID MASS OF UNCLEAR ETIOLOGY: Status: ACTIVE | Noted: 2021-01-01

## 2021-11-17 NOTE — OP NOTE
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     Approximately 3mL 1% Xylocaine with epinephrine was injected in the planned excision site in the right posterior inferior neck.  Excision was accomplished with a #15 blade without difficulty.  The excision was approximately 2.5 cm  x 2.1 cm.  The nodular mass was approximately 2.2 cm x 1.9 cm.  The margin was 1.5 mm. Upon excision the specimen was submitted for permanent pathologic examination.  The wound was irrigated with copious amounts of normal saline solution    Extensive and wide undermining was performed with curved iris scissors and double prong skin hooks.  This was performed in order to facilitate closure and to preserve normal anatomic relationships.  Minimal bleeding was encountered which was controlled with electrocautery on low settings.  The incision was reapproximated utilizing interrupted 4-0 Monocryl subcutaneously and interrupted 5-0 nylon  to reapproximate the epidermis.  Bactroban ointment was applied and a Mepilex also applied.  Attention was then turned to the anterior neck.    The recurrent laryngeal nerve was monitored throughout the case utilizing technologist assisted nerve monitoring Xomed nerve integrity monitor.    Approximately 7 mL of 1% Xylocaine with epinephrine was injected subcutaneously and an incision made 2 fingerbreadths above the manubrium for approximately 5 cm utilizing a #15 blade.  The incision was carried down through the superficial layer of deep cervical fascia and the strap musculature identified in the midline. The strap muscles on the right were transected horizontally utilizing the Ethicon Harmonic scalpel. Minimal bleeding was encountered throughout the case which was controlled with a combination of electrocautery, as well as 2-0 and 3-0 silk ties and the Ethicon Harmonic scalpel.  Right neck mass was intimately involved with the inferior and posterior aspects of the right thyroid lobe.  There appeared to be invasion into the lateral aspect of the trachea near the tracheal groove extending up to the cricoid.  The inferior aspect of the right thyroid lobe was approached first and dissected free from its surrounding fascial attachments with a Kitner dissector.  The inferior thyroid vasculature was identified and the artery and vein separately clamped cut and ligated with 3-0 silk near the capsule of the gland. The recurrent laryngeal nerve was subsequently identified in Siemens triangle.  The nerve was identified and traced superiorly and was going through what appeared to be malignant disease.  Recurrent laryngeal nerve was dissected free and a small segment of tumor was left at the cricoarytenoid joint    The right inferior parathyroid gland was identified and it's blood supply preserved during the dissection.  The recurrent nerve was then traced superiorly to its entrance into the larynx via the cricoarytenoid joint  as the thyroid lobe was mobilized medially.  The middle thyroid vein was identified and ligated near the capsule of the gland utilizing 3-0 silk.    The superior thyroid vascular pedicle was subsequently identified and the superior thyroid artery and vein separately identified, clamped cut and ligated with 3-0 silk near the capsule of the gland.  The right superior parathyroid gland was then identified and it's blood supply preserved as well.   The isthmus of the thyroid gland was transected subsequently utilizing the Ethicon Harmonic scalpel and the right thyroid lobe was extirpated and submitted for permanent pathologic examination. The nerve was intact but did not stimulated at the end of the case.    The wound was irrigated with copious amounts of normal saline solution.  A # 10 Jaleel drain was placed through a separate stab wound inferiorly in the neck.  Was placed in the depths of the wound.  The drain was subsequently secured to the skin of the anterior neck utilizing a single stitch of 2-0 silk.  Reapproximation of the incision was accomplished with interrupted 4-0 Vicryl to reapproximate the strap musculature, the platysma as well as subcutaneous tissues.  The epidermis was reapproximated utilizing a running subcuticular stitch of 5-0 Vicryl.  Steri-Strips, Mastisol, Bactroban ointment and a Mepilex were applied to the incision.    The patient tolerated the procedure well without complications and was transported upon extubation to the postanesthesia care unit in stable condition   .    SPECIMENS:  Right posterior inferior neck mass  Right thyroid isthmus and lobe with associated neck mass         COMPLICATIONS: NONE    ESTIMATED BLOOD LOSS:  < 25 cc    Blair Walsh MD  11/17/2021

## 2021-11-17 NOTE — ANESTHESIA POSTPROCEDURE EVALUATION
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        Post Anesthesia Care and Evaluation    Patient location during evaluation: PACU  Patient participation: complete - patient participated  Level of consciousness: awake and alert  Pain management: adequate  Airway patency: patent  Anesthetic complications: No anesthetic complications    Cardiovascular status: acceptable  Respiratory status: acceptable  Hydration status: acceptable    Comments: Blood pressure 104/66, pulse 83, temperature 97.8 °F (36.6 °C), temperature source Oral, resp. rate 16, height 170.2 cm (67.01\"), weight 65.3 kg (143 lb 15.4 oz), SpO2 96 %.    Pt discharged from PACU based on carlee score >8      "

## 2021-11-17 NOTE — TELEPHONE ENCOUNTER
Left message for his wife to call back to move up his appointment from Jan to the beginning of Dec per [address].

## 2021-11-17 NOTE — ANESTHESIA PROCEDURE NOTES
Airway  Urgency: elective    Date/Time: 11/17/2021 9:14 AM  Airway not difficult    General Information and Staff    Patient location during procedure: OR    Indications and Patient Condition  Indications for airway management: airway protection    Preoxygenated: yes  Mask difficulty assessment: 2 - vent by mask + OA or adjuvant +/- NMBA    Final Airway Details  Final airway type: endotracheal airway      Successful airway: NIM tube    Successful intubation technique: video laryngoscopy  Facilitating devices/methods: intubating stylet  Endotracheal tube insertion site: oral  Blade: Russell  Blade size: 3  Cormack-Lehane Classification: grade I - full view of glottis  Placement verified by: chest auscultation and capnometry   Measured from: gums  ETT/EBT to gums (cm): 21  Number of attempts at approach: 1  Assessment: lips, teeth, and gum same as pre-op and atraumatic intubation    Additional Comments  Placed by Dave Hinkle SRNA

## 2021-11-17 NOTE — ANESTHESIA PREPROCEDURE EVALUATION
Anesthesia Evaluation     Patient summary reviewed   no history of anesthetic complications:  NPO Solid Status: > 8 hours             Airway   Mallampati: II  Dental    (+) upper dentures and lower dentures    Pulmonary    (+) a smoker, lung cancer, COPD,   (-) asthma, sleep apnea  Cardiovascular   Exercise tolerance: good (4-7 METS)    ECG reviewed    (+) hypertension, CAD, cardiac stents (2013) hyperlipidemia,   (-) pacemaker, past MI, angina      Neuro/Psych  (-) seizures, TIA, CVA  GI/Hepatic/Renal/Endo    (+)  GERD,    (-) liver disease, no renal disease, diabetes    Musculoskeletal     Abdominal    Substance History      OB/GYN          Other                        Anesthesia Plan    ASA 3     general     intravenous induction     Anesthetic plan, all risks, benefits, and alternatives have been provided, discussed and informed consent has been obtained with: patient.

## 2021-11-18 ENCOUNTER — OFFICE VISIT (OUTPATIENT)
Dept: PULMONOLOGY | Age: 76
End: 2021-11-18
Payer: OTHER GOVERNMENT

## 2021-11-18 VITALS
TEMPERATURE: 97.8 F | HEIGHT: 67 IN | BODY MASS INDEX: 23.01 KG/M2 | WEIGHT: 146.6 LBS | SYSTOLIC BLOOD PRESSURE: 116 MMHG | HEART RATE: 64 BPM | OXYGEN SATURATION: 93 % | DIASTOLIC BLOOD PRESSURE: 66 MMHG

## 2021-11-18 DIAGNOSIS — I10 ESSENTIAL HYPERTENSION: ICD-10-CM

## 2021-11-18 DIAGNOSIS — F17.210 CIGARETTE NICOTINE DEPENDENCE WITHOUT COMPLICATION: ICD-10-CM

## 2021-11-18 DIAGNOSIS — C34.92 SQUAMOUS CELL CARCINOMA OF LEFT LUNG (HCC): Primary | ICD-10-CM

## 2021-11-18 DIAGNOSIS — E04.1 THYROID NODULE: ICD-10-CM

## 2021-11-18 PROCEDURE — 99214 OFFICE O/P EST MOD 30 MIN: CPT | Performed by: INTERNAL MEDICINE

## 2021-11-18 ASSESSMENT — ENCOUNTER SYMPTOMS
ABDOMINAL DISTENTION: 0
RHINORRHEA: 0
BACK PAIN: 0
WHEEZING: 0
ANAL BLEEDING: 0
CHEST TIGHTNESS: 0
APNEA: 0
COUGH: 1
ABDOMINAL PAIN: 0
SHORTNESS OF BREATH: 0

## 2021-11-18 NOTE — DISCHARGE SUMMARY
[facility]  ENT PROGRESS NOTES  2021      Patient Identification:  Name: Chucky Jamison  Age: 76 y.o.  Sex: male  :  1945  MRN: [id number]                     Date of Admission: 2021      CC:    Postop day #1 status post excision of right posterior cervical mass and right thyroidectomy for biopsy of right neck mass  Subjective     HISTORY   HPI, ROS, PMFSHx reviewed:   No changes       Objective     PE:    Temp:  [97.5 °F (36.4 °C)-98 °F (36.7 °C)] 97.6 °F (36.4 °C)  Heart Rate:  [56-83] 64  Resp:  [12-16] 14  BP: ()/(57-96) 98/57   Body mass index is 22.54 kg/m².     General appearance: alert, well appearing, and in no distress, oriented to person, place, and time and acyanotic, in no respiratory distress.   Ability to Communicate: Patient is able to communicate well but has a breathy voice.  He has no difficulty swallowing liquids or solids.   Facial exam: facial movement was normal and symmetrical, nontender   Ears - right ear normal, left ear normal.   Nasal exam - normal and patent, no erythema, discharge or polyps.   Oropharyngeal exam - mucous membranes moist, pharynx normal without lesions.   Neck exam - supple, no significant adenopathy, incision in the anterior neck looks good the drain is removed without difficulty.  There is essentially no drain output the last shift.  Drain site was dressed..     CVS exam: normal rate and regular rhythm.   Chest: no tachypnea, retractions or cyanosis.   No lymphadenopathy in the anterior or posterior neck, supraclavicular areas.   Neurological exam reveals alert, oriented, normal speech, no focal findings or movement disorder noted, neck supple without rigidity.    DATA      MEDICATIONS   I have reviewed the current MAR.     LABS AND IMAGING      I have reviewed the labs and imaging data since the patient was last seen.       Assessment     ASSESSMENT       Mass of right side of neck    Mass of soft tissue of neck    Thyroid mass of  unclear etiology      Doing well status post excisional biopsy of right posterior cervical mass and thyroid mass      Plan     PLAN     Discharge home  We will call the patient with pathology when available  Follow-up per oncology with Dr. Trujillo as previously arranged  Follow-up in 10 days for suture removal in the ENT office  Continue local wound care          Blair Walhs MD  11/18/2021  07:15 CST

## 2021-11-18 NOTE — PLAN OF CARE
Goal Outcome Evaluation:              Neuros in check. GABRIELA drain output 0 ml. Pt medicated for pain using PO pain meds, with noted relief.

## 2021-11-18 NOTE — PROGRESS NOTES
Pulmonary and Sleep Medicine    Nimesh Messer (:  1945) is a 78 y.o. male,Established patient, here for evaluation of the following chief complaint(s):  Follow-up (Bronch follow up, Pt was discharged from [facility] this morning. Pt had Bx on upper right shoulder and partial thyroidectomy.)      Referring physician:  No referring provider defined for this encounter. ASSESSMENT/PLAN:  1. Squamous cell carcinoma of left lung ([address]  2. Cigarette nicotine dependence without complication  3. Essential hypertension  4. Thyroid nodule        Squamous cell lung cancer of unclear stage at this time. He underwent a biopsy of his thyroid nodule and also of the subcutaneous abnormality on his back. Await pathology. Procedure was done yesterday at [facility] in [place]. Bharath Tyler MD, [facility], [facility]    No follow-ups on file. SUBJECTIVE/OBJECTIVE:  The patient is here for follow-up after his bronchoscopy. The bronchoscopy was consistent with squamous cell carcinoma had an evaluation by allergy. He was referred to ENT for biopsy of the thyroid lesion the lesion on his back that has been seen on PET/CT. Prior to Visit Medications    Medication Sig Taking? Authorizing Provider   ezetimibe (ZETIA) 10 MG tablet Take 10 mg by mouth daily Yes Historical Provider, MD   metoprolol tartrate (LOPRESSOR) 25 MG tablet TAKE 1 TABLET BY MOUTH TWICE A DAY Yes DIVYA Lizarraga   montelukast (SINGULAIR) 10 MG tablet Take 10 mg by mouth nightly Yes Historical Provider, MD   gabapentin (NEURONTIN) 100 MG capsule Take 100 mg by mouth daily.  Yes Historical Provider, MD   rosuvastatin (CRESTOR) 10 MG tablet Take 10 mg by mouth daily Yes Historical Provider, MD   calcium citrate (CALCITRATE) 950 MG tablet Take 1 tablet by mouth daily Yes Historical Provider, MD   metaxalone (SKELAXIN) 800 MG tablet Take 800 mg by mouth Yes Historical Provider, MD   traMADol (ULTRAM) 50 MG tablet Take 50 mg by mouth.. Yes Historical Provider, MD   nitroGLYCERIN (NITROSTAT) 0.4 MG SL tablet Place 1 tablet under the tongue every 5 minutes as needed for Chest pain As directed Yes DIVYA Ochoa   omeprazole (PRILOSEC) 20 MG delayed release capsule Take 20 mg by mouth daily Yes Historical Provider, MD   Multiple Minerals-Vitamins (CALCIUM & VIT D3 BONE HEALTH PO) Take by mouth Yes Historical Provider, MD   Cholecalciferol (VITAMIN D3) 2000 UNITS CAPS Take by mouth Yes Historical Provider, MD   vitamin B-12 (CYANOCOBALAMIN) 1000 MCG tablet Take 1,000 mcg by mouth daily Yes Historical Provider, MD   aspirin 81 MG tablet Take 81 mg by mouth 2 times daily Yes Historical Provider, MD   folic acid (FOLVITE) 1 MG tablet Take 1 mg by mouth daily Yes Historical Provider, MD   Fluticasone Furoate-Vilanterol (BREO ELLIPTA) 200-25 MCG/INH AEPB Inhale into the lungs Yes Historical Provider, MD   azelastine HCl 0.15 % SOLN by Nasal route Yes Historical Provider, MD   cilostazol (PLETAL) 100 MG tablet Take 100 mg by mouth 2 times daily Yes Historical Provider, MD        Review of Systems   Constitutional: Positive for appetite change. Negative for activity change, chills, diaphoresis and fatigue. HENT: Negative for congestion, dental problem, drooling, ear discharge, postnasal drip and rhinorrhea. Eyes: Negative for visual disturbance. Respiratory: Positive for cough. Negative for apnea, chest tightness, shortness of breath and wheezing. Gastrointestinal: Negative for abdominal distention, abdominal pain and anal bleeding. Endocrine: Negative for cold intolerance, heat intolerance and polydipsia. Genitourinary: Negative for difficulty urinating, dysuria, enuresis and flank pain. Musculoskeletal: Negative for arthralgias, back pain and gait problem. Allergic/Immunologic: Negative for environmental allergies. Neurological: Negative for dizziness, facial asymmetry, light-headedness and headaches.        Vitals: 11/18/21 0956   BP: 116/66   Pulse: 64   Temp: 97.8 °F (36.6 °C)   SpO2: 93%     Physical Exam  Vitals reviewed. Constitutional:       Appearance: Normal appearance. HENT:      Head: Normocephalic and atraumatic. Nose: Nose normal.   Eyes:      Extraocular Movements: Extraocular movements intact. Conjunctiva/sclera: Conjunctivae normal.   Cardiovascular:      Rate and Rhythm: Normal rate and regular rhythm. Heart sounds: No murmur heard. No friction rub. Pulmonary:      Effort: Pulmonary effort is normal. No respiratory distress. Breath sounds: Normal breath sounds. No stridor. No wheezing, rhonchi or rales. Abdominal:      General: There is no distension. Palpations: There is no mass. Tenderness: There is no abdominal tenderness. There is no guarding or rebound. Musculoskeletal:      Cervical back: Normal range of motion and neck supple. Neurological:      Mental Status: He is alert and oriented to person, place, and time. This note was generated used a voice recognition software. Errors in voice recognition may have occurred. An electronic signature was used to authenticate this note.     --Cordelia Monique MD

## 2021-11-22 NOTE — TELEPHONE ENCOUNTER
I have contacted wife with results. I have spoken with Rachael Valladares RN with Dr. Trujillo and given them results.

## 2021-11-22 NOTE — TELEPHONE ENCOUNTER
----- Message from Blair Walsh MD sent at 11/22/2021 12:47 PM CST -----  Please call the patient regarding his abnormal result.  Send a copy of the report to Dr. Trujillo's office as well.

## 2021-11-24 ENCOUNTER — HOSPITAL ENCOUNTER (OUTPATIENT)
Dept: MRI IMAGING | Age: 76
Discharge: HOME | End: 2021-11-24
Payer: OTHER GOVERNMENT

## 2021-11-24 DIAGNOSIS — R51.9 NEW ONSET OF HEADACHES: ICD-10-CM

## 2021-11-24 PROCEDURE — A9577 INJ MULTIHANCE: HCPCS | Performed by: INTERNAL MEDICINE

## 2021-11-24 PROCEDURE — 70553 MRI BRAIN STEM W/O & W/DYE: CPT

## 2021-11-24 PROCEDURE — 6360000004 HC RX CONTRAST MEDICATION: Performed by: INTERNAL MEDICINE

## 2021-11-24 RX ADMIN — GADOBENATE DIMEGLUMINE 13 ML: 529 INJECTION, SOLUTION INTRAVENOUS at 12:00

## 2021-11-29 NOTE — PROGRESS NOTES
Procedure   Suture Removal    Date/Time: 11/29/2021 11:11 AM  Performed by: Nuris Barba RN  Authorized by: Blair Walsh MD   Consent: Verbal consent obtained.  Consent given by: patient  Patient identity confirmed: verbally with patient  Body area: head/neck  Location details: neck  Comments: Patient presents for suture removal. The incisions are well-approximated with no redness or edema noted. Patient does have mild hoarseness. He has an appt with Dr. Trujillo. They will let me know if hoarseness does not get better.

## 2021-12-02 ENCOUNTER — OFFICE VISIT (OUTPATIENT)
Dept: CARDIOLOGY CLINIC | Age: 76
End: 2021-12-02
Payer: OTHER GOVERNMENT

## 2021-12-02 VITALS
WEIGHT: 143 LBS | HEART RATE: 69 BPM | DIASTOLIC BLOOD PRESSURE: 72 MMHG | SYSTOLIC BLOOD PRESSURE: 128 MMHG | HEIGHT: 67 IN | BODY MASS INDEX: 22.44 KG/M2 | OXYGEN SATURATION: 95 %

## 2021-12-02 DIAGNOSIS — I10 ESSENTIAL HYPERTENSION: ICD-10-CM

## 2021-12-02 DIAGNOSIS — E78.5 HYPERLIPIDEMIA, UNSPECIFIED HYPERLIPIDEMIA TYPE: ICD-10-CM

## 2021-12-02 DIAGNOSIS — I25.10 CORONARY ARTERY DISEASE INVOLVING NATIVE CORONARY ARTERY OF NATIVE HEART WITHOUT ANGINA PECTORIS: Primary | ICD-10-CM

## 2021-12-02 DIAGNOSIS — I25.10 CORONARY ARTERY DISEASE INVOLVING NATIVE CORONARY ARTERY OF NATIVE HEART WITHOUT ANGINA PECTORIS: ICD-10-CM

## 2021-12-02 DIAGNOSIS — R06.02 SHORTNESS OF BREATH: ICD-10-CM

## 2021-12-02 PROCEDURE — 99214 OFFICE O/P EST MOD 30 MIN: CPT | Performed by: NURSE PRACTITIONER

## 2021-12-02 RX ORDER — NITROGLYCERIN 0.4 MG/1
0.4 TABLET SUBLINGUAL EVERY 5 MIN PRN
Qty: 25 TABLET | Refills: 5 | Status: SHIPPED | OUTPATIENT
Start: 2021-12-02

## 2021-12-02 ASSESSMENT — ENCOUNTER SYMPTOMS
WHEEZING: 1
COUGH: 1
SORE THROAT: 0
CHEST TIGHTNESS: 0
SHORTNESS OF BREATH: 1

## 2021-12-02 NOTE — PROGRESS NOTES
[facility] Cardiology   Established Patient Office Visit  [address]  [address] [address] [facility]  [phone]        OFFICE VISIT:  2021    Julius Falk - : 1945    Reason For Visit:  Sheela Webb is a 78 y.o. male who is here for 6 Month Follow-Up    1. Coronary artery disease involving native coronary artery of native heart without angina pectoris    2. Essential hypertension    3. Hyperlipidemia, unspecified hyperlipidemia type    4. Shortness of breath      Patient with a history of coronary artery disease, hypertension and hyperlipidemia. He is a patient of Dr. Rosalba Webster. Patient seen pulmonology for lung mass. Patient will be starting treatment for his lung cancer and is due to see oncology next week. Patient presents to clinic today for 6-month follow-up. Patient denies any chest pain, pressure or tightness. He does have shortness of breath. There is no orthopnea or PND. Patient denies any lightheadedness, dizziness or syncope.       Subjective    Julius Falk is a 78 y.o. male with the following history as recorded in [facility]:    Patient Active Problem List    Diagnosis Date Noted    Dyspnea on effort 06/10/2019    H/O heart artery stent 06/10/2019    Thyroid nodule 2021    Squamous cell carcinoma of [facility] lung ([facility] 2021    Cough 2021    Lung mass 2021    Abdominal aortic aneurysm (AAA) ([facility] 2020    Essential hypertension 2018    Cigarette nicotine dependence without complication     CAD (coronary artery disease)     Mixed hyperlipidemia     PVD (peripheral vascular disease) (HCC)     Tobacco abuse      Current Outpatient Medications   Medication Sig Dispense Refill    ezetimibe (ZETIA) 10 MG tablet Take 10 mg by mouth daily      metoprolol tartrate (LOPRESSOR) 25 MG tablet TAKE 1 TABLET BY MOUTH TWICE A  tablet 3    montelukast (SINGULAIR) 10 MG tablet Take 10 mg by mouth nightly      gabapentin (NEURONTIN) 100 MG capsule Take 100 mg by mouth daily.  rosuvastatin (CRESTOR) 10 MG tablet Take 20 mg by mouth daily       calcium citrate (CALCITRATE) 950 MG tablet Take 1 tablet by mouth daily      metaxalone (SKELAXIN) 800 MG tablet Take 800 mg by mouth      traMADol (ULTRAM) 50 MG tablet Take 50 mg by mouth. [place] nitroGLYCERIN (NITROSTAT) 0.4 MG SL tablet Place 1 tablet under the tongue every 5 minutes as needed for Chest pain As directed 25 tablet 5    omeprazole (PRILOSEC) 20 MG delayed release capsule Take 20 mg by mouth daily      Multiple Minerals-Vitamins (CALCIUM & VIT D3 BONE HEALTH PO) Take by mouth      Cholecalciferol (VITAMIN D3) 2000 UNITS CAPS Take by mouth      vitamin B-12 (CYANOCOBALAMIN) 1000 MCG tablet Take 1,000 mcg by mouth daily      aspirin 81 MG tablet Take 81 mg by mouth 2 times daily      folic acid (FOLVITE) 1 MG tablet Take 1 mg by mouth daily      Fluticasone Furoate-Vilanterol (BREO ELLIPTA) 200-25 MCG/INH AEPB Inhale into the lungs      azelastine HCl 0.15 % SOLN by Nasal route      cilostazol (PLETAL) 100 MG tablet Take 100 mg by mouth 2 times daily       No current facility-administered medications for this visit.      Allergies: Zithromax [azithromycin], Penicillins, and Shellfish-derived products  Past Medical History:   Diagnosis Date    CAD (coronary artery disease)     RUFINO to LCX 1/13    Hyperlipidemia     Lung [facility] ([facility]     Nicotine dependence, cigarettes, w unsp disorders 12/1/2017    Prostate cancer ([facility]     PVD (peripheral vascular disease) ([facility]     Tobacco abuse      Past Surgical History:   Procedure Laterality Date    BRONCHOSCOPY Left 11/5/2021    BRONCHOSCOPY ENDOBRONCHIAL ULTRASOUND performed by Roberta Vaughn MD at [facility]   [address]  02/02/2013    EF 65%, patent stent to LCX, no new occlusive disease    CATARACT REMOVAL WITH IMPLANT Bilateral 2014    CORONARY ANGIOPLASTY  01/28/2013    RUFINO to LCX dry.  PSYCHIATRIC - normal mood and affect, alert and orientated x 3,      ASSESSMENT:    ALL THE CARDIOLOGY PROBLEMS ARE LISTED ABOVE; HOWEVER, THE FOLLOWING SPECIFIC CARDIAC PROBLEMS / CONDITIONS WERE ADDRESSED AND TREATED DURING THE OFFICE VISIT TODAY:                                                                                            MEDICAL DECISION MAKING             Cardiac Specific Problem / Diagnosis  Discussion and Data Reviewed Diagnostic Procedures Ordered Management Options Selected           1. Lung cancer/shortness of breath  Shows no change   Review and summation of old records:    O2 sat in the office 95%. Patient is due to start chemotherapy and radiation. Last 2D echo was 2 years ago. His EF was 58%. Will order 2D echo to evaluate current EF prior to starting chemotherapy. Yes Continue current medications:     Yes           2. CAD  Stable   No chest pain. Patient is on Lopressor, aspirin, and Crestor. No Continue current medications:    Yes           3. Hypertension Stable  blood pressure in the office today 128/72. O2 sat 95%. Patient is on Lopressor 25 mg twice daily. No Continue current medications: Yes                     Orders Placed This Encounter   Procedures    Echo 2D w doppler w color complete     Standing Status:   Future     Standing Expiration Date:   12/2/2022     Order Specific Question:   Reason for exam:     Answer:   dyspnea on exertion; prechem for lung cancer     No orders of the defined types were placed in this encounter. Discussed with patient and spouse. Return in about 2 weeks (around 12/16/2021) for phone visit for results with me . I greatly appreciate the opportunity to meet Lamont Lopez and your confidence in allowing me to participate in his cardiovascular care. DIVYA Schmitz NP  12/2/2021 8:33 AM CST                    This dictation was generated by voice recognition computer software.  Although all attempts are made to edit dictation for accuracy, there may be errors in the transcription that are not intended.

## 2021-12-03 NOTE — PROGRESS NOTES
MEDICAL ONCOLOGY PROGRESS NOTE    Pt Name: Emil Kline  MRN: [id number]  YOB: 1945  Date of evaluation: 12/6/2021      HISTORY OF PRESENT ILLNESS:    Diagnosis  · Squamous cell carcinoma, Left lung, Nov 2021  · [id number], stage IV (left thyroid nodule, skin metastasis)  · 11/5/21-PD-L1 Expressed 35%    Treatment Summary  · Anticipated Carboplatin AUC 5 Paclitaxel 175mg/m2 and Keytruda      Cancer History  Rosita Joyner was first seen by me on 11/15/2021. He was referred by pulmonary at [facility] for a diagnosis of lung cancer. He developed complaints of cough. Chest x-ray was performed and showed a lung mass. Patient is a currently smoker. He lost about 25 pounds. · 9/28/21 CXR [facility]): Questionable mass density seen only on the lateral view with patchy density in left upper lobe. Further evaluation with computed tomography is recommended. COPD. · 10/4/21 CT chest [facility]): Mass lesion left hilum extending caudally along the mediastinum and inseparable from the mediastinum in this region. This is highly suspicious for malignancy. Consolidation and/or neoplastic mass left upper lobe as described above Emphysema. New right thyroid nodule, probably solid. 2 chronic cysts within the liver. · 10/22/21- PET scan [facility], [facility] [place]): Essentially, mass in the right side of the neck of the neck base measuring 2.8 cm x 2.4 cm. SUV 18.9. Also small focus soft tissue of the back at the base of the neck on the right. Lesion measured 9 mm. SUV 10.5. Large mass in the left hilum measures 5.1 x 4.2 cm (SUV 18.6). Extensive parenchymal abnormality with a high SUV uptake in the left upper lobe. Apical component with a small amount of cavitation measuring 2.9 x 2.6 cm with SUV 11.3.  · 11/5/21 Lung, left mainstem bronchus biopsy with touch imprint smears: Invasive keratinizing squamous cell carcinoma.  Lung, fine-needle aspiration of left hilar mass, ThinPrep and cell block: Keratinizing squamous cell carcinoma. Lung, left mainstem bronchial washing, ThinPrep and cell block: Keratinizing squamous cell carcinoma. · 11/15/2021-he was first seen by me. Recommended biopsy of right neck mass and also subcutaneous nodule right upper back. Discussed with ENT, Dr. Yetta Galeazzi. · 11/17/2021 Final Diagnosis:Metastatic nodule, right posterior lower neck\": Metastatic keratinizing squamous cell carcinoma involving fibroadipose tissue. 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 · 11/24/2021 MRI Brain W Wo Contrast No acute intracranial abnormality, mass or acute infarction. Chronic ischemic and atrophic changes. · 12/6/2021-essentially, stage IV disease. Squamous cell carcinoma PDL 35% metastatic left upper back skin nodule consistent with squamous cell carcinoma. Left thyroid mass consistent with metastatic, cell carcinoma. Recommended frontline chemotherapy with carboplatin AUC 5, paclitaxel 175 mg/m², Keytruda every 3 weeks x4 cycles to be followed by maintenance Keytruda if stable disease.     Past Medical History:    Past Medical History:   Diagnosis Date    CAD (coronary artery disease)     RUFINO to LCX 1/13    Hyperlipidemia     Lung cancer ([address]     Nicotine dependence, cigarettes, w unsp disorders 12/1/2017    [facility]     PVD (peripheral vascular disease) [facility]     Tobacco abuse        Past Surgical History:    Past Surgical History:   Procedure Laterality Date    BRONCHOSCOPY Left 11/5/2021    BRONCHOSCOPY ENDOBRONCHIAL ULTRASOUND performed by Leila Brooks MD at [facility]   [address]  02/02/2013    EF 65%, patent stent to LCX, no new occlusive disease    CATARACT REMOVAL WITH IMPLANT Bilateral 2014    CORONARY ANGIOPLASTY  01/28/2013    RUFINO to LCX    HERNIA REPAIR      HX VASCULAR STENT  2013    LUNG BIOPSY  11/05/2021    PROSTATE SURGERY      SHOULDER SURGERY Left        Social History:    Marital status:   Smoking status:Yes; 60 years; 5-6 a day  ETOH status:No  Resides:[place], [place]    Family History:   Family History   Problem Relation Age of Onset    Hypertension Mother     Stroke Mother     Cancer Brother 61        Lung    Cancer Brother 79        Prostate       Current Hospital Medications:    Current Outpatient Medications   Medication Sig Dispense Refill    ondansetron (ZOFRAN) 4 MG tablet Take 1 tablet by mouth daily as needed for Nausea or Vomiting 30 tablet 0    dexamethasone (DECADRON) 4 MG tablet Take 5 tablets the night before and 5 tablets the morning of treatment every 3 weeks x 4 cycles 40 tablet 0    nitroGLYCERIN (NITROSTAT) 0.4 MG SL tablet Place 1 tablet under the tongue every 5 minutes as needed for Chest pain As directed 25 tablet 5    ezetimibe (ZETIA) 10 MG tablet Take 10 mg by mouth daily      metoprolol tartrate (LOPRESSOR) 25 MG tablet TAKE 1 TABLET BY MOUTH TWICE A  tablet 3    montelukast (SINGULAIR) 10 MG tablet Take 10 mg by mouth nightly      gabapentin (NEURONTIN) 100 MG capsule Take 100 mg by mouth daily.  rosuvastatin (CRESTOR) 10 MG tablet Take 20 mg by mouth daily       calcium citrate (CALCITRATE) 950 MG tablet Take 1 tablet by mouth daily      metaxalone (SKELAXIN) 800 MG tablet Take 800 mg by mouth      traMADol (ULTRAM) 50 MG tablet Take 50 mg by mouth. Gailen Ort omeprazole (PRILOSEC) 20 MG delayed release capsule Take 20 mg by mouth daily      Multiple Minerals-Vitamins (CALCIUM & VIT D3 BONE HEALTH PO) Take by mouth      Cholecalciferol (VITAMIN D3) 2000 UNITS CAPS Take by mouth      vitamin B-12 (CYANOCOBALAMIN) 1000 MCG tablet Take 1,000 mcg by mouth daily      aspirin 81 MG tablet Take 81 mg by mouth 2 times daily      folic acid (FOLVITE) 1 MG tablet Take 1 mg by mouth daily      Fluticasone Furoate-Vilanterol (BREO ELLIPTA) 200-25 MCG/INH AEPB Inhale into the lungs      azelastine HCl 0.15 % SOLN by Nasal route      cilostazol (PLETAL) 100 MG tablet Take 100 mg by mouth 2 times daily       No current facility-administered medications for this visit. Allergies: Allergies   Allergen Reactions    Zithromax [Azithromycin]      abd pain.  Penicillins Rash     And pain      Shellfish-Derived Products Rash         Subjective   REVIEW OF SYSTEMS:   CONSTITUTIONAL: no fever, no night sweats, weakness, fatigue, no appetite;  HEENT:trouble swallowing,no blurring of vision, no double vision, no hearing difficulty, no tinnitus, no ulceration, no dysplasia, no epistaxis;   LUNGS: no cough, no hemoptysis, no wheeze,  no shortness of breath;  CARDIOVASCULAR: no palpitation, no chest pain, no shortness of breath;  GI: no abdominal pain, no nausea, no vomiting, no diarrhea, no constipation;  LLUVIA: no dysuria, no hematuria, no frequency or urgency, no nephrolithiasis;  MUSCULOSKELETAL:mid back pain, no joint pain, no swelling, no stiffness;  ENDOCRINE: no polyuria, no polydipsia, no cold or heat intolerance;  HEMATOLOGY: no easy bruising or bleeding, no history of clotting disorder;  DERMATOLOGY: no skin rash, no eczema, no pruritus;  PSYCHIATRY: no depression, no anxiety, no panic attacks, no suicidal ideation, no homicidal ideation;  NEUROLOGY:occasional headaches, no syncope, no seizures, no numbness or tingling of hands, no numbness or tingling of feet, no paresis;       Objective   /76   Pulse 102   Ht 5' 7\" (1.702 m)   Wt 141 lb 4.8 oz (64.1 kg)   SpO2 94%   BMI 22.13 kg/m²      Wt Readings from Last 3 Encounters:   12/06/21 141 lb 4.8 oz (64.1 kg)   12/02/21 143 lb (64.9 kg)   11/18/21 146 lb 9.6 oz (66.5 kg)           PHYSICAL EXAM:  CONSTITUTIONAL: Alert, appropriate, no acute distress  EYES: Non icteric, EOM intact, pupils equal round   ENT: Mucus membranes moist, no oral pharyngeal lesions, external inspection of ears and nose are normal.  NECK: Supple, no masses.   No palpable thyroid mass  CHEST/LUNGS: CTA bilaterally, normal respiratory effort   CARDIOVASCULAR: RRR, no murmurs. No lower extremity edema  ABDOMEN: soft non-tender, active bowel sounds, no HSM. No palpable masses  EXTREMITIES: warm, full ROM in all 4 extremities, no focal weakness. SKIN: warm, dry with no rashes or lesions  LYMPH: No cervical, clavicular, axillary, or inguinal lymphadenopathy  NEUROLOGIC: follows commands, non focal   PSYCH: mood and affect appropriate. Alert and oriented to time, place, person      LABORATORY RESULTS REVIEWED/ANALYZED BY ME:  11/17/2021 Final Diagnosis:Metastatic nodule, right posterior lower neck\": Metastatic keratinizing squamous cell carcinoma involving fibroadipose tissue. 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 Lab Results   Component Value Date    WBC 10.23 (H) 12/06/2021    HGB 14.7 12/06/2021    HCT 45.1 12/06/2021    MCV 97.0 (H) 12/06/2021     12/06/2021     Lab Results   Component Value Date    NEUTROABS 6.94 (H) 12/06/2021       RADIOLOGY STUDIES REVIEWED BY ME:   11/24/2021 MRI Brain W Wo Contrast No acute intracranial abnormality, mass or acute infarction. Chronic ischemic and atrophic changes. ASSESSMENT:    No orders of the defined types were placed in this encounter. Chucky Gomez was seen today for follow-up. Diagnoses and all orders for this visit:    Lung mass    Thyroid nodule    Care plan discussed with patient    Nausea  -     ondansetron (ZOFRAN) 4 MG tablet; Take 1 tablet by mouth daily as needed for Nausea or Vomiting    Squamous cell carcinoma of left lung (HCC)  -     ondansetron (ZOFRAN) 4 MG tablet; Take 1 tablet by mouth daily as needed for Nausea or Vomiting  -     dexamethasone (DECADRON) 4 MG tablet;  Take 5 tablets the night before and 5 tablets the morning of treatment every 3 weeks x 4 cycles    Acute nonintractable headache, unspecified headache type    Stage IV squamous cell carcinoma of left lung (HCC)    Non-small cell lung cancer, SCC [id number] (left thyroid, skin subcutaneous nodule) %  Essentially, findings of squamous cell non-small cell lung cancer left lung with skin metastasis/left thyroid metastasis  Reviewed pathology skin nodule/left thyroid mass which were both consistent with metastatic squamous cell carcinoma. Recommended:  · Carboplatin AUC 5  · Paclitaxel 175 mg/m²  · Keytruda 200mg IV   X4 cycles  every 3 weeks  To be followed by maintenance Keytruda 200 mg IV every 3 weeks  Discussed at length about logistics/side effects of treatment. Headaches-patient had complaints of headache. MRI brain was unremarkable    PLAN:  · Education to patient and wife of diagnose and staging  · Recommend Keytruda- when ins approves   · Recommend CarboTaxol- when ins approves  · Recommend Zofran 4mg for nausea- script sent  · Education information given to patient  · Discussed side effects on Keytruda immunotherapy   · Recommend CT scans after Chemo  · Recommend Dexamethasone 4 mg- script sent  · Instructions given to patient with steroids  · RTC with Keyona Omer on 1/3/2022  · Review pathology results  · Discussed Dr. Emilia Choi, Daphnie Jalloh am pre charting  as Medical Assistant for Ruby Julio MD. Electronically signed by Daphnie Jalloh MA on 12/6/2021 at 4:23 PM CST. Wyatt Wellington am scribing for Ruby Julio MD. Electronically signed by Tejal Cannon RN on 12/6/2021 at 11:51 AM CST. I, Dr Soco Sawyer, personally performed the services described in this documentation as scribed by Tejal Cannon RN in my presence and is both accurate and complete. I have seen, examined and reviewed this patient medication list, appropriate labs and imaging studies. I reviewed relevant medical records and others physicians notes. I discussed the plans of care with the patient. I answered all the questions to the patients satisfaction.  I have also reviewed the chief complaint (CC) and part of the history (History of Present Illness (HPI), Past Family Social History [facility]), or Review of Systems (ROS) and made changes when appropriated.        (Please note that portions of this note were completed with a voice recognition program. Efforts were made to edit the dictations but occasionally words are mis-transcribed.)    Electronically signed by Aaliyah Dale MD on 12/6/2021 at 3:02 PM

## 2021-12-04 PROBLEM — R05.9 COUGH: Status: RESOLVED | Noted: 2021-11-04 | Resolved: 2021-12-04

## 2021-12-06 ENCOUNTER — OFFICE VISIT (OUTPATIENT)
Dept: HEMATOLOGY | Age: 76
End: 2021-12-06
Payer: OTHER GOVERNMENT

## 2021-12-06 ENCOUNTER — HOSPITAL ENCOUNTER (OUTPATIENT)
Dept: INFUSION THERAPY | Age: 76
Discharge: HOME | End: 2021-12-06
Payer: OTHER GOVERNMENT

## 2021-12-06 VITALS
DIASTOLIC BLOOD PRESSURE: 76 MMHG | HEART RATE: 102 BPM | WEIGHT: 141.3 LBS | HEIGHT: 67 IN | OXYGEN SATURATION: 94 % | SYSTOLIC BLOOD PRESSURE: 124 MMHG | BODY MASS INDEX: 22.18 KG/M2

## 2021-12-06 DIAGNOSIS — R11.0 NAUSEA: ICD-10-CM

## 2021-12-06 DIAGNOSIS — R51.9 ACUTE NONINTRACTABLE HEADACHE, UNSPECIFIED HEADACHE TYPE: ICD-10-CM

## 2021-12-06 DIAGNOSIS — C34.92 SQUAMOUS CELL CARCINOMA OF LEFT LUNG (HCC): ICD-10-CM

## 2021-12-06 DIAGNOSIS — R91.8 LUNG MASS: ICD-10-CM

## 2021-12-06 DIAGNOSIS — R91.8 LUNG MASS: Primary | ICD-10-CM

## 2021-12-06 DIAGNOSIS — C34.92 STAGE IV SQUAMOUS CELL CARCINOMA OF LEFT LUNG (HCC): ICD-10-CM

## 2021-12-06 DIAGNOSIS — E04.1 THYROID NODULE: ICD-10-CM

## 2021-12-06 DIAGNOSIS — Z71.89 CARE PLAN DISCUSSED WITH PATIENT: ICD-10-CM

## 2021-12-06 LAB
BASOPHILS ABSOLUTE: 0.02 K/UL (ref 0.01–0.08)
BASOPHILS RELATIVE PERCENT: 0.2 % (ref 0.1–1.2)
EOSINOPHILS ABSOLUTE: 0.26 K/UL (ref 0.04–0.54)
EOSINOPHILS RELATIVE PERCENT: 2.5 % (ref 0.7–7)
HCT VFR BLD CALC: 45.1 % (ref 40.1–51)
HEMOGLOBIN: 14.7 G/DL (ref 13.7–17.5)
LYMPHOCYTES ABSOLUTE: 2.25 K/UL (ref 1.18–3.74)
LYMPHOCYTES RELATIVE PERCENT: 22 % (ref 19.3–53.1)
MCH RBC QN AUTO: 31.6 PG (ref 25.7–32.2)
MCHC RBC AUTO-ENTMCNC: 32.6 G/DL (ref 32.3–36.5)
MCV RBC AUTO: 97 FL (ref 79–92.2)
MONOCYTES ABSOLUTE: 0.76 K/UL (ref 0.24–0.82)
MONOCYTES RELATIVE PERCENT: 7.4 % (ref 4.7–12.5)
NEUTROPHILS ABSOLUTE: 6.94 K/UL (ref 1.56–6.13)
NEUTROPHILS RELATIVE PERCENT: 67.9 % (ref 34–71.1)
PDW BLD-RTO: 13.6 % (ref 11.6–14.4)
PLATELET # BLD: 202 K/UL (ref 163–337)
PMV BLD AUTO: 9.7 FL (ref 7.4–10.4)
RBC # BLD: 4.65 M/UL (ref 4.63–6.08)
WBC # BLD: 10.23 K/UL (ref 4.23–9.07)

## 2021-12-06 PROCEDURE — 85025 COMPLETE CBC W/AUTO DIFF WBC: CPT

## 2021-12-06 PROCEDURE — 99212 OFFICE O/P EST SF 10 MIN: CPT

## 2021-12-06 PROCEDURE — 36415 COLL VENOUS BLD VENIPUNCTURE: CPT

## 2021-12-06 PROCEDURE — 99214 OFFICE O/P EST MOD 30 MIN: CPT | Performed by: INTERNAL MEDICINE

## 2021-12-06 RX ORDER — ONDANSETRON 4 MG/1
4 TABLET, FILM COATED ORAL DAILY PRN
Qty: 30 TABLET | Refills: 0 | Status: SHIPPED | OUTPATIENT
Start: 2021-12-06

## 2021-12-06 RX ORDER — DEXAMETHASONE 4 MG/1
TABLET ORAL
Qty: 40 TABLET | Refills: 0 | Status: SHIPPED | OUTPATIENT
Start: 2021-12-06 | End: 2022-04-04

## 2021-12-07 ENCOUNTER — TELEPHONE (OUTPATIENT)
Dept: CARDIOLOGY CLINIC | Age: 76
End: 2021-12-07

## 2021-12-07 NOTE — TELEPHONE ENCOUNTER
Patients wife called in saying the [place] told her the recent visit with Rudy Al will not be covered or the echo scheduled for Thursday because they did not receive a request for service for echo and OV with [place]. Jennifer Wade this needs to be faxed to [place]- number is [phone] attach office visit from [place].

## 2021-12-08 ENCOUNTER — CASE MANAGEMENT (OUTPATIENT)
Dept: INFUSION THERAPY | Age: 76
End: 2021-12-08

## 2021-12-08 NOTE — PROGRESS NOTES
Called and introduced myself to [place] and his wife today. I went over his insurance benefits and I informed them that the [place] should cover his chemotherapy treatments fully. I asked If I could assist with anything else financially and they declined. I am going to mail my card to them if they have any billing or financial concerns.     Denzel Us, [address] [place] [address] Medical Oncology and Hematology  [phone]

## 2021-12-09 ENCOUNTER — HOSPITAL ENCOUNTER (OUTPATIENT)
Dept: NON INVASIVE DIAGNOSTICS | Age: 76
Discharge: HOME | End: 2021-12-09
Payer: MEDICARE

## 2021-12-09 DIAGNOSIS — R06.02 SHORTNESS OF BREATH: ICD-10-CM

## 2021-12-09 LAB
LV EF: 58 %
LVEF MODALITY: NORMAL

## 2021-12-09 PROCEDURE — 93306 TTE W/DOPPLER COMPLETE: CPT

## 2021-12-09 PROCEDURE — 93356 MYOCRD STRAIN IMG SPCKL TRCK: CPT

## 2021-12-13 ENCOUNTER — TELEPHONE (OUTPATIENT)
Dept: HEMATOLOGY | Age: 76
End: 2021-12-13

## 2021-12-13 NOTE — TELEPHONE ENCOUNTER
PT WIFE STATED THEY LIVE IN [place] AND THE [facility] COME THROUGH THAT AREA AND WHEN WE SCHEDULE HIM FOR ANY TREATMENTS TO CALL HIS CELL [phone].

## 2021-12-16 ENCOUNTER — OFFICE VISIT (OUTPATIENT)
Dept: PULMONOLOGY | Age: 76
End: 2021-12-16
Payer: MEDICARE

## 2021-12-16 VITALS
HEART RATE: 51 BPM | BODY MASS INDEX: 22.22 KG/M2 | SYSTOLIC BLOOD PRESSURE: 116 MMHG | HEIGHT: 67 IN | DIASTOLIC BLOOD PRESSURE: 60 MMHG | OXYGEN SATURATION: 94 % | TEMPERATURE: 98.3 F | WEIGHT: 141.6 LBS

## 2021-12-16 DIAGNOSIS — C34.92 SQUAMOUS CELL CARCINOMA OF LEFT LUNG (HCC): Primary | ICD-10-CM

## 2021-12-16 DIAGNOSIS — R05.9 COUGH: ICD-10-CM

## 2021-12-16 DIAGNOSIS — F17.210 CIGARETTE NICOTINE DEPENDENCE WITHOUT COMPLICATION: ICD-10-CM

## 2021-12-16 DIAGNOSIS — R49.0 HOARSENESS: ICD-10-CM

## 2021-12-16 DIAGNOSIS — I10 ESSENTIAL HYPERTENSION: ICD-10-CM

## 2021-12-16 PROCEDURE — 99214 OFFICE O/P EST MOD 30 MIN: CPT | Performed by: INTERNAL MEDICINE

## 2021-12-16 ASSESSMENT — ENCOUNTER SYMPTOMS
ABDOMINAL DISTENTION: 0
BACK PAIN: 0
COUGH: 0
APNEA: 0
SHORTNESS OF BREATH: 0
WHEEZING: 0
ANAL BLEEDING: 0
CHEST TIGHTNESS: 0
ABDOMINAL PAIN: 0
RHINORRHEA: 0

## 2021-12-16 NOTE — PROGRESS NOTES
Pulmonary and Sleep Medicine    Cindy Wade (:  1945) is a 78 y.o. male,Established patient, here for evaluation of the following chief complaint(s):  Follow-up (Path report follow up)      Referring physician:  No referring provider defined for this encounter. ASSESSMENT/PLAN:  1. 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 2. Cigarette nicotine dependence without complication  3. Essential hypertension  4. Cough  5. Hoarseness        We will continue with the current management for treatment of lung cancer per Dr. Aly Black. Paco Scott MD, [facility], [facility]    Return in about 3 months (around 3/16/2022). SUBJECTIVE/OBJECTIVE:  The patient is here for follow-up on squamous cell carcinoma of the lung. He did have a biopsy of the skin does have metastatic disease. He is supposed to start chemotherapy this Monday. He follows with Dr. Aly Black. Prior to Visit Medications    Medication Sig Taking? Authorizing Provider   ondansetron (ZOFRAN) 4 MG tablet Take 1 tablet by mouth daily as needed for Nausea or Vomiting Yes Marissa Apodaca MD   dexamethasone (DECADRON) 4 MG tablet Take 5 tablets the night before and 5 tablets the morning of treatment every 3 weeks x 4 cycles Yes Marissa Apodaca MD   nitroGLYCERIN (NITROSTAT) 0.4 MG SL tablet Place 1 tablet under the tongue every 5 minutes as needed for Chest pain As directed Yes DIVYA Ireland - NP   ezetimibe (ZETIA) 10 MG tablet Take 10 mg by mouth daily Yes Historical Provider, MD   metoprolol tartrate (LOPRESSOR) 25 MG tablet TAKE 1 TABLET BY MOUTH TWICE A DAY Yes DIVYA Bourgeois   montelukast (SINGULAIR) 10 MG tablet Take 10 mg by mouth nightly Yes Historical Provider, MD   gabapentin (NEURONTIN) 100 MG capsule Take 100 mg by mouth daily.  Yes Historical Provider, MD   rosuvastatin (CRESTOR) 10 MG tablet Take 20 mg by mouth daily  Yes Historical Provider, MD   calcium citrate (CALCITRATE) 950 MG tablet Take 1 tablet by mouth daily Yes Historical Provider, MD   metaxalone (SKELAXIN) 800 MG tablet Take 800 mg by mouth Yes Historical Provider, MD   traMADol (ULTRAM) 50 MG tablet Take 50 mg by mouth. . Yes Historical Provider, MD   omeprazole (PRILOSEC) 20 MG delayed release capsule Take 20 mg by mouth daily Yes Historical Provider, MD   Multiple Minerals-Vitamins (CALCIUM & VIT D3 BONE HEALTH PO) Take by mouth Yes Historical Provider, MD   Cholecalciferol (VITAMIN D3) 2000 UNITS CAPS Take by mouth Yes Historical Provider, MD   vitamin B-12 (CYANOCOBALAMIN) 1000 MCG tablet Take 1,000 mcg by mouth daily Yes Historical Provider, MD   aspirin 81 MG tablet Take 81 mg by mouth 2 times daily Yes Historical Provider, MD   folic acid (FOLVITE) 1 MG tablet Take 1 mg by mouth daily Yes Historical Provider, MD   Fluticasone Furoate-Vilanterol (BREO ELLIPTA) 200-25 MCG/INH AEPB Inhale into the lungs Yes Historical Provider, MD   azelastine HCl 0.15 % SOLN by Nasal route Yes Historical Provider, MD   cilostazol (PLETAL) 100 MG tablet Take 100 mg by mouth 2 times daily Yes Historical Provider, MD        Review of Systems   Constitutional: Negative for activity change, appetite change, chills, diaphoresis and fatigue. HENT: Negative for congestion, dental problem, drooling, ear discharge, postnasal drip and rhinorrhea. Eyes: Negative for visual disturbance. Respiratory: Negative for apnea, cough, chest tightness, shortness of breath and wheezing. Gastrointestinal: Negative for abdominal distention, abdominal pain and anal bleeding. Endocrine: Negative for cold intolerance, heat intolerance and polydipsia. Genitourinary: Negative for difficulty urinating, dysuria, enuresis and flank pain. Musculoskeletal: Negative for arthralgias, back pain and gait problem. Allergic/Immunologic: Negative for environmental allergies. Neurological: Negative for dizziness, facial asymmetry, light-headedness and headaches.

## 2021-12-17 ENCOUNTER — TELEPHONE (OUTPATIENT)
Dept: CARDIOLOGY CLINIC | Age: 76
End: 2021-12-17

## 2021-12-17 ENCOUNTER — VIRTUAL VISIT (OUTPATIENT)
Dept: CARDIOLOGY CLINIC | Age: 76
End: 2021-12-17
Payer: OTHER GOVERNMENT

## 2021-12-17 DIAGNOSIS — E78.5 HYPERLIPIDEMIA, UNSPECIFIED HYPERLIPIDEMIA TYPE: ICD-10-CM

## 2021-12-17 DIAGNOSIS — I25.10 CORONARY ARTERY DISEASE INVOLVING NATIVE CORONARY ARTERY OF NATIVE HEART WITHOUT ANGINA PECTORIS: Primary | ICD-10-CM

## 2021-12-17 DIAGNOSIS — I10 ESSENTIAL HYPERTENSION: ICD-10-CM

## 2021-12-17 PROCEDURE — 99441 PR PHYS/QHP TELEPHONE EVALUATION 5-10 MIN: CPT | Performed by: NURSE PRACTITIONER

## 2021-12-17 NOTE — PROGRESS NOTES
David Brower is a 78 y.o. male evaluated via telephone on 12/17/2021. Consent:  He and/or health care decision maker is aware that that he may receive a bill for this telephone service, depending on his insurance coverage, and has provided verbal consent to proceed: Yes      Documentation:  I communicated with the patient and/or health care decision maker about echo results. Details of this discussion including any medical advice provided: I was physically located for this phone visit in the cardiology office in [place], [place]. Patient was physically located at his residence. Patient with a history of coronary artery disease, hypertension and hyperlipidemia.     He is a patient of Dr. Bridgette Ortega.     Patient seen pulmonology for lung mass. Patient will be starting treatment for his lung cancer and is due to see oncology next week. Patient was last seen in the office on 12/2/2021 with complaints of shortness of breath. 2D echo was ordered which showed:      Summary   Normal left ventricular size and systolic function EF 55 to 60%. Normal LV wall thickness with transmitral Doppler consistent with mild [grade 1] diastolic dysfunction. Normal left atrial size. Mild sclerosis of a tricuspid aortic valve with adequate cusp separation   neither stenosis or insufficiency. Normally mobile mitral valve with trace regurgitation. No significant pulmonic stenosis or insufficiency. Normal right-sided chambers with preserved RV systolic function. Mild tricuspid regurgitation with normal RVSP estimate of 32 mmHg. IVC dimension and inspiratory motion are normal consistent with normal   right atrial filling pressures.  Aortic root dimensions fall within normal limits with upper normal   ascending aortic measurement of 3.3 cm   No significant pericardial effusion      Signature      ----------------------------------------------------------------   Electronically signed by Jose R Meehan MD(Interpreting physician) on 12/09/2021 01:28 PM   ----------------------------------------------------------------    Results were given to patient and patient's wife. They verbalized understanding. Review of Systems  Constitutional: Negative for fever, chills, diaphoresis, activity change, appetite change, fatigue and unexpected weight change. Eyes: Negative for photophobia, pain, redness and visual disturbance. Respiratory: Negative for apnea, cough, chest tightness, shortness of breath, wheezing and stridor. Cardiovascular: Negative for chest pain, palpitations and leg swelling. Gastrointestinal: Negative for abdominal distention. Genitourinary: Negative for dysuria, urgency and frequency. Musculoskeletal: Negative for myalgias, arthralgias and gait problem. Skin: Negative for color change, pallor, rash and wound. Neurological: Negative for dizziness, tremors, speech difficulty, weakness and numbness. Hematological: Does not bruise/bleed easily. Psychiatric/Behavioral: Negative.     Lamont Lopez is a 78 y.o. male with the following history as recorded in [facility]:  Patient Active Problem List    Diagnosis Date Noted    Dyspnea on effort 06/10/2019    H/O heart artery stent 06/10/2019    Hoarseness 12/16/2021    Thyroid nodule 11/18/2021    Squamous cell carcinoma of left lung ([facility] [address] 11/18/2021    Cough 11/04/2021    Lung mass 11/04/2021    Abdominal aortic aneurysm (AAA) ([facility] 02/27/2020    Essential hypertension 12/05/2018    Cigarette nicotine dependence without complication 19/82/8158    CAD (coronary artery disease)     Mixed hyperlipidemia     PVD (peripheral vascular disease) (HCC)     Tobacco abuse      Current Outpatient Medications   Medication Sig Dispense Refill    ondansetron (ZOFRAN) 4 MG tablet Take 1 tablet by mouth daily as needed for Nausea or Vomiting 30 tablet 0    dexamethasone (DECADRON) 4 MG tablet Take 5 tablets the night before and 5 tablets the morning of treatment every 3 weeks x 4 cycles 40 tablet 0    nitroGLYCERIN (NITROSTAT) 0.4 MG SL tablet Place 1 tablet under the tongue every 5 minutes as needed for Chest pain As directed 25 tablet 5    ezetimibe (ZETIA) 10 MG tablet Take 10 mg by mouth daily      metoprolol tartrate (LOPRESSOR) 25 MG tablet TAKE 1 TABLET BY MOUTH TWICE A  tablet 3    montelukast (SINGULAIR) 10 MG tablet Take 10 mg by mouth nightly      gabapentin (NEURONTIN) 100 MG capsule Take 100 mg by mouth daily.  rosuvastatin (CRESTOR) 10 MG tablet Take 20 mg by mouth daily       calcium citrate (CALCITRATE) 950 MG tablet Take 1 tablet by mouth daily      metaxalone (SKELAXIN) 800 MG tablet Take 800 mg by mouth      traMADol (ULTRAM) 50 MG tablet Take 50 mg by mouth. Carlos Bruce omeprazole (PRILOSEC) 20 MG delayed release capsule Take 20 mg by mouth daily      Multiple Minerals-Vitamins (CALCIUM & VIT D3 BONE HEALTH PO) Take by mouth      Cholecalciferol (VITAMIN D3) 2000 UNITS CAPS Take by mouth      vitamin B-12 (CYANOCOBALAMIN) 1000 MCG tablet Take 1,000 mcg by mouth daily      aspirin 81 MG tablet Take 81 mg by mouth 2 times daily      folic acid (FOLVITE) 1 MG tablet Take 1 mg by mouth daily      Fluticasone Furoate-Vilanterol (BREO ELLIPTA) 200-25 MCG/INH AEPB Inhale into the lungs      azelastine HCl 0.15 % SOLN by Nasal route      cilostazol (PLETAL) 100 MG tablet Take 100 mg by mouth 2 times daily       No current facility-administered medications for this visit.      Allergies: Zithromax [azithromycin], Penicillins, and Shellfish-derived products  Past Medical History:   Diagnosis Date    CAD (coronary artery disease)     RUFINO to LCX 1/13    Hyperlipidemia     Lung cancer ([facility] [address]     Nicotine dependence, cigarettes, w unsp disorders 12/1/2017    Prostate cancer ([facility] [address] [facility]     PVD (peripheral vascular disease) ([facility] [address]     Tobacco abuse      Past Surgical History:   Procedure Laterality Date    BRONCHOSCOPY Left 11/5/2021    BRONCHOSCOPY ENDOBRONCHIAL ULTRASOUND performed by Dilan Watson MD at [facility]   [address]  02/02/2013    EF 65%, patent stent to LCX, no new occlusive disease    CATARACT REMOVAL WITH IMPLANT Bilateral 2014    CORONARY ANGIOPLASTY  01/28/2013    RUFINO to LCX    HERNIA REPAIR      HX VASCULAR STENT  2013    LUNG BIOPSY  11/05/2021    PROSTATE SURGERY      SHOULDER SURGERY Left      Family History   Problem Relation Age of Onset    Hypertension Mother     Stroke Mother     Cancer Brother 61        Lung    Cancer Brother 79        Prostate     Social History     Tobacco Use    Smoking status: Current Every Day Smoker     Packs/day: 0.25     Years: 60.00     Pack years: 15.00     Types: Cigarettes    Smokeless tobacco: Never Used   Substance Use Topics    Alcohol use: No        Assessment/ Plan:  1. CAD -no chest pain. Patient is on Lopressor, aspirin and Crestor. Continue present medical management  2. Hypertension -well controlled on Lopressor 25 mg twice daily. Continue present medical management  3. Hyperlipidemia -on Crestor. Continue present medical management  4. Shortness of breath with known lung mass -patient to undergo treatment. Pretreatment 2D echo showed an EF of 55 to 60%. Disposition: Return in about 3 months (around 3/17/2022) for Dr Karen Alexis . I affirm this is a Patient Initiated Episode with an Established Patient who has not had a related appointment within my department in the past 7 days or scheduled within the next 24 hours.     Patient identification was verified at the start of the visit: Yes    Total Time: minutes: 5-10 minutes    Note: not billable if this call serves to triage the patient into an appointment for the relevant concern      DIVYA Campos NP

## 2021-12-17 NOTE — TELEPHONE ENCOUNTER
Pt's needing to reschedule appt on 03/17/21 to an earlier appt. Next earlier appt is on 05/26/22 with Dr. Cyndy Tobin. Pt's wife if it's okay to wait that long for flwup and also needing to discuss if another ECHO is needed after Chemo. Please contact pt to schedule.     Thank you

## 2021-12-18 RX ORDER — ONDANSETRON 2 MG/ML
8 INJECTION INTRAMUSCULAR; INTRAVENOUS
Status: CANCELLED | OUTPATIENT
Start: 2021-12-20

## 2021-12-18 RX ORDER — EPINEPHRINE 1 MG/ML
0.3 INJECTION, SOLUTION, CONCENTRATE INTRAVENOUS PRN
Status: CANCELLED | OUTPATIENT
Start: 2021-12-20

## 2021-12-18 RX ORDER — DIPHENHYDRAMINE HYDROCHLORIDE 50 MG/ML
25 INJECTION INTRAMUSCULAR; INTRAVENOUS ONCE
Status: CANCELLED | OUTPATIENT
Start: 2021-12-20

## 2021-12-18 RX ORDER — HEPARIN SODIUM (PORCINE) LOCK FLUSH IV SOLN 100 UNIT/ML 100 UNIT/ML
500 SOLUTION INTRAVENOUS PRN
Status: CANCELLED | OUTPATIENT
Start: 2021-12-20

## 2021-12-18 RX ORDER — ALBUTEROL SULFATE 90 UG/1
4 AEROSOL, METERED RESPIRATORY (INHALATION) PRN
Status: CANCELLED | OUTPATIENT
Start: 2021-12-20

## 2021-12-18 RX ORDER — PALONOSETRON 0.05 MG/ML
0.25 INJECTION, SOLUTION INTRAVENOUS ONCE
Status: CANCELLED | OUTPATIENT
Start: 2021-12-20

## 2021-12-18 RX ORDER — SODIUM CHLORIDE 9 MG/ML
25 INJECTION, SOLUTION INTRAVENOUS PRN
Status: CANCELLED | OUTPATIENT
Start: 2021-12-20

## 2021-12-18 RX ORDER — DIPHENHYDRAMINE HYDROCHLORIDE 50 MG/ML
50 INJECTION INTRAMUSCULAR; INTRAVENOUS
Status: CANCELLED | OUTPATIENT
Start: 2021-12-20

## 2021-12-18 RX ORDER — SODIUM CHLORIDE 9 MG/ML
5-40 INJECTION INTRAVENOUS PRN
Status: CANCELLED | OUTPATIENT
Start: 2021-12-20

## 2021-12-18 RX ORDER — SODIUM CHLORIDE 0.9 % (FLUSH) 0.9 %
5-40 SYRINGE (ML) INJECTION PRN
Status: CANCELLED | OUTPATIENT
Start: 2021-12-20

## 2021-12-18 RX ORDER — SODIUM CHLORIDE 9 MG/ML
INJECTION, SOLUTION INTRAVENOUS CONTINUOUS
Status: CANCELLED | OUTPATIENT
Start: 2021-12-20

## 2021-12-18 RX ORDER — SODIUM CHLORIDE 9 MG/ML
20 INJECTION, SOLUTION INTRAVENOUS ONCE
Status: CANCELLED | OUTPATIENT
Start: 2021-12-20 | End: 2021-12-20

## 2021-12-18 RX ORDER — ACETAMINOPHEN 325 MG/1
650 TABLET ORAL
Status: CANCELLED | OUTPATIENT
Start: 2021-12-20

## 2021-12-18 RX ORDER — MEPERIDINE HYDROCHLORIDE 50 MG/ML
12.5 INJECTION INTRAMUSCULAR; INTRAVENOUS; SUBCUTANEOUS PRN
Status: CANCELLED | OUTPATIENT
Start: 2021-12-20

## 2021-12-20 ENCOUNTER — HOSPITAL ENCOUNTER (OUTPATIENT)
Dept: INFUSION THERAPY | Age: 76
Discharge: HOME | End: 2021-12-20
Payer: OTHER GOVERNMENT

## 2021-12-20 VITALS
WEIGHT: 141.1 LBS | OXYGEN SATURATION: 96 % | TEMPERATURE: 98.3 F | RESPIRATION RATE: 18 BRPM | BODY MASS INDEX: 22.15 KG/M2 | SYSTOLIC BLOOD PRESSURE: 117 MMHG | DIASTOLIC BLOOD PRESSURE: 70 MMHG | HEIGHT: 67 IN | HEART RATE: 63 BPM

## 2021-12-20 DIAGNOSIS — E03.9 ACQUIRED HYPOTHYROIDISM: Primary | ICD-10-CM

## 2021-12-20 DIAGNOSIS — R91.8 LUNG MASS: Primary | ICD-10-CM

## 2021-12-20 DIAGNOSIS — E03.9 ACQUIRED HYPOTHYROIDISM: ICD-10-CM

## 2021-12-20 DIAGNOSIS — C34.92 SQUAMOUS CELL CARCINOMA OF LEFT LUNG (HCC): ICD-10-CM

## 2021-12-20 LAB
ALBUMIN SERPL-MCNC: 4.4 G/DL (ref 3.5–5.2)
ALP BLD-CCNC: 86 U/L (ref 40–130)
ALT SERPL-CCNC: 23 U/L (ref 21–72)
ANION GAP SERPL CALCULATED.3IONS-SCNC: 12 MMOL/L (ref 7–19)
AST SERPL-CCNC: 30 U/L (ref 17–59)
BASOPHILS ABSOLUTE: 0.01 K/UL (ref 0.01–0.08)
BASOPHILS RELATIVE PERCENT: 0.1 % (ref 0.1–1.2)
BILIRUB SERPL-MCNC: 0.9 MG/DL (ref 0.2–1.3)
BUN BLDV-MCNC: 17 MG/DL (ref 9–20)
CALCIUM SERPL-MCNC: 9.8 MG/DL (ref 8.4–10.2)
CHLORIDE BLD-SCNC: 103 MMOL/L (ref 98–111)
CO2: 26 MMOL/L (ref 22–29)
CREAT SERPL-MCNC: 0.7 MG/DL (ref 0.6–1.2)
EOSINOPHILS ABSOLUTE: 0 K/UL (ref 0.04–0.54)
EOSINOPHILS RELATIVE PERCENT: 0 % (ref 0.7–7)
GFR NON-AFRICAN AMERICAN: >60
GLOBULIN: 3.5 G/DL
GLUCOSE BLD-MCNC: 241 MG/DL (ref 74–106)
HCT VFR BLD CALC: 42.8 % (ref 40.1–51)
HEMOGLOBIN: 14.7 G/DL (ref 13.7–17.5)
LYMPHOCYTES ABSOLUTE: 0.76 K/UL (ref 1.18–3.74)
LYMPHOCYTES RELATIVE PERCENT: 9.5 % (ref 19.3–53.1)
MCH RBC QN AUTO: 31.6 PG (ref 25.7–32.2)
MCHC RBC AUTO-ENTMCNC: 34.3 G/DL (ref 32.3–36.5)
MCV RBC AUTO: 92 FL (ref 79–92.2)
MONOCYTES ABSOLUTE: 0.08 K/UL (ref 0.24–0.82)
MONOCYTES RELATIVE PERCENT: 1 % (ref 4.7–12.5)
NEUTROPHILS ABSOLUTE: 7.16 K/UL (ref 1.56–6.13)
NEUTROPHILS RELATIVE PERCENT: 89.4 % (ref 34–71.1)
PDW BLD-RTO: 12.9 % (ref 11.6–14.4)
PLATELET # BLD: 250 K/UL (ref 163–337)
PMV BLD AUTO: 9.8 FL (ref 7.4–10.4)
POTASSIUM SERPL-SCNC: 4.2 MMOL/L (ref 3.5–5.1)
RBC # BLD: 4.65 M/UL (ref 4.63–6.08)
SODIUM BLD-SCNC: 141 MMOL/L (ref 137–145)
TOTAL PROTEIN: 7.9 G/DL (ref 6.3–8.2)
TSH SERPL DL<=0.05 MIU/L-ACNC: 1.41 UIU/ML (ref 0.47–4.68)
WBC # BLD: 8.01 K/UL (ref 4.23–9.07)

## 2021-12-20 PROCEDURE — 6360000002 HC RX W HCPCS: Performed by: INTERNAL MEDICINE

## 2021-12-20 PROCEDURE — 96375 TX/PRO/DX INJ NEW DRUG ADDON: CPT

## 2021-12-20 PROCEDURE — 2580000003 HC RX 258: Performed by: INTERNAL MEDICINE

## 2021-12-20 PROCEDURE — 2500000003 HC RX 250 WO HCPCS: Performed by: INTERNAL MEDICINE

## 2021-12-20 PROCEDURE — 80053 COMPREHEN METABOLIC PANEL: CPT

## 2021-12-20 PROCEDURE — 84443 ASSAY THYROID STIM HORMONE: CPT

## 2021-12-20 PROCEDURE — 85025 COMPLETE CBC W/AUTO DIFF WBC: CPT

## 2021-12-20 PROCEDURE — 96413 CHEMO IV INFUSION 1 HR: CPT

## 2021-12-20 PROCEDURE — 96415 CHEMO IV INFUSION ADDL HR: CPT

## 2021-12-20 PROCEDURE — 96417 CHEMO IV INFUS EACH ADDL SEQ: CPT

## 2021-12-20 PROCEDURE — 36415 COLL VENOUS BLD VENIPUNCTURE: CPT

## 2021-12-20 RX ORDER — DIPHENHYDRAMINE HYDROCHLORIDE 50 MG/ML
25 INJECTION INTRAMUSCULAR; INTRAVENOUS ONCE
Status: COMPLETED | OUTPATIENT
Start: 2021-12-20 | End: 2021-12-20

## 2021-12-20 RX ORDER — PALONOSETRON 0.05 MG/ML
0.25 INJECTION, SOLUTION INTRAVENOUS ONCE
Status: COMPLETED | OUTPATIENT
Start: 2021-12-20 | End: 2021-12-20

## 2021-12-20 RX ORDER — SODIUM CHLORIDE 9 MG/ML
20 INJECTION, SOLUTION INTRAVENOUS ONCE
Status: COMPLETED | OUTPATIENT
Start: 2021-12-20 | End: 2021-12-21

## 2021-12-20 RX ORDER — DEXAMETHASONE SODIUM PHOSPHATE 10 MG/ML
10 INJECTION, SOLUTION INTRAMUSCULAR; INTRAVENOUS ONCE
Status: COMPLETED | OUTPATIENT
Start: 2021-12-20 | End: 2021-12-20

## 2021-12-20 RX ORDER — HEPARIN SODIUM (PORCINE) LOCK FLUSH IV SOLN 100 UNIT/ML 100 UNIT/ML
500 SOLUTION INTRAVENOUS PRN
Status: DISCONTINUED | OUTPATIENT
Start: 2021-12-20 | End: 2021-12-21 | Stop reason: HOSPADM

## 2021-12-20 RX ORDER — SODIUM CHLORIDE 0.9 % (FLUSH) 0.9 %
5-40 SYRINGE (ML) INJECTION PRN
Status: DISCONTINUED | OUTPATIENT
Start: 2021-12-20 | End: 2021-12-21 | Stop reason: HOSPADM

## 2021-12-20 RX ADMIN — PACLITAXEL 300 MG: 6 INJECTION, SOLUTION, CONCENTRATE INTRAVENOUS at 10:58

## 2021-12-20 RX ADMIN — SODIUM CHLORIDE 20 ML/HR: 9 INJECTION, SOLUTION INTRAVENOUS at 10:08

## 2021-12-20 RX ADMIN — SODIUM CHLORIDE 200 MG: 9 INJECTION, SOLUTION INTRAVENOUS at 10:26

## 2021-12-20 RX ADMIN — DIPHENHYDRAMINE HYDROCHLORIDE 25 MG: 50 INJECTION INTRAMUSCULAR; INTRAVENOUS at 10:06

## 2021-12-20 RX ADMIN — DEXAMETHASONE SODIUM PHOSPHATE 10 MG: 10 INJECTION, SOLUTION INTRAMUSCULAR; INTRAVENOUS at 10:07

## 2021-12-20 RX ADMIN — CARBOPLATIN 545 MG: 10 INJECTION, SOLUTION INTRAVENOUS at 14:07

## 2021-12-20 RX ADMIN — PALONOSETRON 0.25 MG: 0.05 INJECTION, SOLUTION INTRAVENOUS at 10:06

## 2021-12-20 RX ADMIN — FAMOTIDINE 20 MG: 10 INJECTION, SOLUTION INTRAVENOUS at 10:06

## 2021-12-21 NOTE — PROGRESS NOTES
MEDICAL ONCOLOGY PROGRESS NOTE    Pt Name: Edwin Rhoades  MRN: [id number]  YOB: 1945  Date of evaluation: 1/10/2022      HISTORY OF PRESENT ILLNESS:    Diagnosis  · Squamous cell carcinoma, Left lung, Nov 2021  · [id number], stage IV (left thyroid nodule, skin metastasis)  · 11/5/21-PD-L1 Expressed 35%    Treatment Summary  · 12/20/21- Carboplatin AUC 5 Paclitaxel 175mg/m2 and Keytruda      Cancer History  Bang Calderón was first seen by me on 11/15/2021. He was referred by pulmonary at [facility] for a diagnosis of lung cancer. He developed complaints of cough. Chest x-ray was performed and showed a lung mass. Patient is a currently smoker. He lost about 25 pounds. · 9/28/21 CXR [facility]): Questionable mass density seen only on the lateral view with patchy density in left upper lobe. Further evaluation with computed tomography is recommended. COPD. · 10/4/21 CT chest [facility]): Mass lesion left hilum extending caudally along the mediastinum and inseparable from the mediastinum in this region. This is highly suspicious for malignancy. Consolidation and/or neoplastic mass left upper lobe as described above Emphysema. New right thyroid nodule, probably solid. 2 chronic cysts within the liver. · 10/22/21- PET scan [facility]): Essentially, mass in the right side of the neck of the neck base measuring 2.8 cm x 2.4 cm. SUV 18.9. Also small focus soft tissue of the back at the base of the neck on the right. Lesion measured 9 mm. SUV 10.5. Large mass in the left hilum measures 5.1 x 4.2 cm (SUV 18.6). Extensive parenchymal abnormality with a high SUV uptake in the left upper lobe. Apical component with a small amount of cavitation measuring 2.9 x 2.6 cm with SUV 11.3.  · 11/5/21 Lung, left mainstem bronchus biopsy with touch imprint smears: Invasive keratinizing squamous cell carcinoma.  Lung, fine-needle aspiration of left hilar mass, ThinPrep and cell block: Keratinizing squamous cell carcinoma. Lung, left mainstem bronchial washing, ThinPrep and cell block: Keratinizing squamous cell carcinoma. · 11/15/2021-he was first seen by me. Recommended biopsy of right neck mass and also subcutaneous nodule right upper back. Discussed with ENT, Dr. Ludivina Grijalva. · 11/17/2021 Final Diagnosis:Metastatic nodule, right posterior lower neck\": Metastatic keratinizing squamous cell carcinoma involving fibroadipose tissue. 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 · 11/24/2021 MRI Brain W Wo Contrast No acute intracranial abnormality, mass or acute infarction. Chronic ischemic and atrophic changes. · 12/6/2021-essentially, stage IV disease. Squamous cell carcinoma PDL 35% metastatic left upper back skin nodule consistent with squamous cell carcinoma. Left thyroid mass consistent with metastatic, cell carcinoma. Recommended frontline chemotherapy with carboplatin AUC 5, paclitaxel 175 mg/m², Keytruda every 3 weeks x4 cycles to be followed by maintenance Keytruda if stable disease. · 12/9/2021 2D Echo Normal left ventricular size and systolic function EF 55 to 60%. · 12/13/2021-initiation of palliative chemotherapy with carboplatin/Taxol/Keytruda.       Past Medical History:    Past Medical History:   Diagnosis Date    CAD (coronary artery disease)     RUFINO to LCX 1/13    Hyperlipidemia     Lung cancer ([address]     Nicotine dependence, cigarettes, w unsp disorders 12/1/2017    Prostate cancer ([facility] [address]     PVD (peripheral vascular disease) ([facility] [address]     Tobacco abuse        Past Surgical History:    Past Surgical History:   Procedure Laterality Date    BRONCHOSCOPY Left 11/5/2021    BRONCHOSCOPY ENDOBRONCHIAL ULTRASOUND performed by Corey Santana MD at [facility]   [address]  02/02/2013    EF 65%, patent stent to LCX, no new occlusive disease    CATARACT REMOVAL WITH IMPLANT Bilateral 2014    CORONARY ANGIOPLASTY  01/28/2013    RUIFNO to LCX    HERNIA REPAIR      HX VASCULAR STENT  2013    LUNG BIOPSY  11/05/2021    PROSTATE SURGERY      SHOULDER SURGERY Left        Social History:    Marital status:   Smoking status:Yes; 60 years; 5-6 a day  ETOH status:No  Resides:[place]    Family History:   Family History   Problem Relation Age of Onset    Hypertension Mother     Stroke Mother     Cancer Brother 61        Lung    Cancer Brother 79        Prostate       Current Hospital Medications:    Current Outpatient Medications   Medication Sig Dispense Refill    sorbitol 70 % solution Take 30 mLs by mouth daily 900 mL 0    metoprolol tartrate (LOPRESSOR) 25 MG tablet TAKE 1 TABLET BY MOUTH TWICE A  tablet 0    gabapentin (NEURONTIN) 100 MG capsule Take 1 capsule by mouth 2 times daily for 30 days. 60 capsule 0    ondansetron (ZOFRAN) 4 MG tablet Take 1 tablet by mouth daily as needed for Nausea or Vomiting 30 tablet 0    dexamethasone (DECADRON) 4 MG tablet Take 5 tablets the night before and 5 tablets the morning of treatment every 3 weeks x 4 cycles 40 tablet 0    nitroGLYCERIN (NITROSTAT) 0.4 MG SL tablet Place 1 tablet under the tongue every 5 minutes as needed for Chest pain As directed 25 tablet 5    ezetimibe (ZETIA) 10 MG tablet Take 10 mg by mouth daily      montelukast (SINGULAIR) 10 MG tablet Take 10 mg by mouth nightly      rosuvastatin (CRESTOR) 10 MG tablet Take 20 mg by mouth daily       calcium citrate (CALCITRATE) 950 MG tablet Take 1 tablet by mouth daily      metaxalone (SKELAXIN) 800 MG tablet Take 800 mg by mouth      traMADol (ULTRAM) 50 MG tablet Take 50 mg by mouth. Christiano Muñoz omeprazole (PRILOSEC) 20 MG delayed release capsule Take 20 mg by mouth daily      Multiple Minerals-Vitamins (CALCIUM & VIT D3 BONE HEALTH PO) Take by mouth      Cholecalciferol (VITAMIN D3) 2000 UNITS CAPS Take by mouth      vitamin B-12 (CYANOCOBALAMIN) 1000 MCG tablet Take 1,000 mcg by mouth daily      aspirin 81 MG tablet Take 81 mg by mouth 2 times daily      folic acid (FOLVITE) 1 MG tablet Take 1 mg by mouth daily      Fluticasone Furoate-Vilanterol (BREO ELLIPTA) 200-25 MCG/INH AEPB Inhale into the lungs      azelastine HCl 0.15 % SOLN by Nasal route      cilostazol (PLETAL) 100 MG tablet Take 100 mg by mouth 2 times daily       No current facility-administered medications for this visit. Allergies: Allergies   Allergen Reactions    Zithromax [Azithromycin]      abd pain.  Penicillins Rash     And pain      Shellfish-Derived Products Rash         Subjective   REVIEW OF SYSTEMS:   CONSTITUTIONAL: no fever, no night sweats, no fatigue, difficulty swallowing;   HEENT: no blurring of vision, no double vision, no hearing difficulty, no tinnitus, no ulceration, no dysplasia, no epistaxis;  hoarseness  LUNGS: no cough, no hemoptysis, no wheeze,  no shortness of breath;  CARDIOVASCULAR: no palpitation, no chest pain, no shortness of breath;  GI: no abdominal pain,  nausea, no vomiting, no diarrhea, no constipation;  LLUVIA: no dysuria, no hematuria, no frequency or urgency, no nephrolithiasis;  MUSCULOSKELETAL: arthralgia/myalgia, no joint pain, no swelling, no stiffness;  ENDOCRINE: no polyuria, no polydipsia, no cold or heat intolerance;  HEMATOLOGY: no easy bruising or bleeding, no history of clotting disorder;  DERMATOLOGY: no skin rash, no eczema, no pruritus;  PSYCHIATRY: no depression, no anxiety, no panic attacks, no suicidal ideation, no homicidal ideation;  NEUROLOGY: no syncope, no seizures, no numbness or tingling of hands, no numbness or tingling of feet, no paresis;       Objective   /64 (Site: Left Upper Arm, Position: Sitting)   Pulse 52   Temp 97.6 °F (36.4 °C)   Resp 16   Ht 5' 7\" (1.702 m)   Wt 140 lb (63.5 kg)   SpO2 97%   BMI 21.93 kg/m²        PHYSICAL EXAM:  CONSTITUTIONAL: Alert, appropriate, no acute distress  EYES: Non icteric, EOM intact, pupils equal round   ENT: Mucus membranes moist, no oral pharyngeal lesions, external inspection of ears and nose are normal.  NECK: Supple, no masses. No palpable thyroid mass  CHEST/LUNGS: CTA bilaterally, normal respiratory effort   CARDIOVASCULAR: RRR, no murmurs. No lower extremity edema  ABDOMEN: soft non-tender, active bowel sounds, no HSM. No palpable masses  EXTREMITIES: warm, full ROM in all 4 extremities, no focal weakness. SKIN: warm, dry with no rashes or lesions  LYMPH: No cervical, clavicular, axillary, or inguinal lymphadenopathy  NEUROLOGIC: follows commands, non focal   PSYCH: mood and affect appropriate.   Alert and oriented to time, place, person      LABORATORY RESULTS REVIEWED/ANALYZED BY ME:    Lab Results   Component Value Date    WBC 10.84 (H) 01/10/2022    HGB 13.2 (L) 01/10/2022    HCT 37.8 (L) 01/10/2022    MCV 91.7 01/10/2022     01/10/2022     Lab Results   Component Value Date    NEUTROABS 9.75 (H) 01/10/2022     Lab Results   Component Value Date     01/10/2022    K 4.4 01/10/2022     01/10/2022    CO2 23 01/10/2022    BUN 15 01/10/2022    CREATININE 0.6 01/10/2022    GLUCOSE 216 (H) 01/10/2022    CALCIUM 9.4 01/10/2022    PROT 7.7 01/10/2022    LABALBU 4.1 01/10/2022    BILITOT 1.0 01/10/2022    ALKPHOS 78 01/10/2022    AST 44 01/10/2022    ALT 36 01/10/2022    LABGLOM >60 01/10/2022    GFRAA >60 11/24/2021    GLOB 3.6 01/10/2022         RADIOLOGY STUDIES REVIEWED BY ME:  12/9/2021 2D Echo Normal left ventricular size and systolic function EF 55 to 60%Normal LV wall thickness with transmitral Doppler consistent with mild [grade 1] diastolic dysfunction Normal left atrial size Mild sclerosis of a tricuspid aortic valve with adequate cusp separation neither stenosis or insufficiency Normally mobile mitral valve with trace regurgitation  No significant pulmonic stenosis or insufficiency Normal right-sided chambers with preserved RV systolic function Mild tricuspid regurgitation with normal RVSP estimate of 32 mmHg IVC dimension and inspiratory motion are normal consistent with normal right atrial filling pressures Aortic root dimensions fall within normal limits with upper normal ascending aortic measurement of 3.3 cm No significant pericardial effusion    ASSESSMENT:    Orders Placed This Encounter   Procedures    CBC Auto Differential     Standing Status:   Future     Number of Occurrences:   1     Standing Expiration Date:   1/10/2023    Comprehensive Metabolic Panel     Standing Status:   Future     Number of Occurrences:   1     Standing Expiration Date:   1/10/2023    TSH without Reflex     Standing Status:   Future     Number of Occurrences:   1     Standing Expiration Date:   1/10/2023      Jessica Amezcua was seen today for cancer. Diagnoses and all orders for this visit:    Lung mass  -     CBC Auto Differential; Future  -     Comprehensive Metabolic Panel; Future    Thyroid nodule  -     TSH without Reflex; Future    Squamous cell carcinoma of left lung (HCC)    Stage IV squamous cell carcinoma of left lung (HCC)    Chemotherapy management, encounter for    Encounter for antineoplastic immunotherapy    Adverse effect of chemotherapy, subsequent encounter    Care plan discussed with patient      Non-small cell lung cancer, SCC [id number] stage IV (left thyroid, skin subcutaneous nodule) %  Essentially, findings of squamous cell non-small cell lung cancer left lung with skin metastasis/left thyroid metastasis  Reviewed pathology skin nodule/left thyroid mass which were both consistent with metastatic squamous cell carcinoma. Recommended:  · Carboplatin AUC 5  · Paclitaxel 175 mg/m²  · Keytruda 200mg IV   X4 cycles  every 3 weeks  To be followed by maintenance Keytruda 200 mg IV every 3 weeks  Discussed at length about logistics/side effects of treatment. Chemotherapy related adverse events-fatigue, myalgia, arthralgia, nausea     Headaches- resolved.   MRI brain was unremarkable    PLAN:  · RTC with MD 3 weeks in treatment room  · C#2 CarboTaxol Keytruda today and every 3 weeks  · CBC/CMP  · Continue Zofran 4mg for nausea- script sent  · Repeat CT scans after next treatment  · Continue follow-up with Dr. Janessa Xiong, Fadi Santos am pre charting  as Medical Assistant for Tania Walters MD. Electronically signed by Fadi Santos MA on 1/10/2022 at 11:56 AM CST. Karey Ley am pre-charting as a registered nurse for Tania Walters MD. Electronically signed by Stephanie Campos RN on 1/10/2022 at 9:52 AM CST. I have seen, examined and reviewed this patient medication list, appropriate labs and imaging studies. I reviewed relevant medical records and others physicians notes. I discussed the plans of care with the patient. I answered all the questions to the patients satisfaction. I have also reviewed the chief complaint (CC) and part of the history (History of Present Illness (HPI), Past Family Social History [facility]), or Review of Systems (ROS) and made changes when appropriated.        (Please note that portions of this note were completed with a voice recognition program. Efforts were made to edit the dictations but occasionally words are mis-transcribed.)

## 2021-12-23 DIAGNOSIS — G62.0 CHEMOTHERAPY-INDUCED NEUROPATHY (HCC): Primary | ICD-10-CM

## 2021-12-23 DIAGNOSIS — T45.1X5A CHEMOTHERAPY-INDUCED NEUROPATHY (HCC): Primary | ICD-10-CM

## 2021-12-23 RX ORDER — GABAPENTIN 100 MG/1
100 CAPSULE ORAL 2 TIMES DAILY
Qty: 60 CAPSULE | Refills: 0 | Status: SHIPPED | OUTPATIENT
Start: 2021-12-23 | End: 2022-01-22

## 2021-12-23 NOTE — TELEPHONE ENCOUNTER
PT's wife Chemo called stating that the PT is having increased neuropathy after Taxol treatment on Monday. This RN spoke with Josafat STOKES. Araceli Ibanez states that PT can take one 100mg gabapentin twice daily. RX sent to Dr. Robert Mullen to sign. Dr. Delores Harper notified of PT's complaint at this time.

## 2021-12-28 PROBLEM — J38.01 UNILATERAL COMPLETE PARALYSIS OF VOCAL CORD: Status: ACTIVE | Noted: 2021-01-01

## 2021-12-28 PROBLEM — C73 THYROID CARCINOMA (HCC): Status: ACTIVE | Noted: 2021-01-01

## 2021-12-28 PROBLEM — R13.14 PHARYNGOESOPHAGEAL DYSPHAGIA: Status: ACTIVE | Noted: 2021-01-01

## 2021-12-28 NOTE — PATIENT INSTRUCTIONS
Options for treatment vocal cord paralysis associated with dysphagia were discussed at length.  My recommendation was for dysphagia study associated with speech therapy evaluation and treatment  Follow-up in 6 to 8 weeks  Continue treatment with oncology  Option for vocal cord medialization if speech therapy treatment exam and previous dysphagia was also discussed    Be aware of the signs and symptoms of recurrent head and neck cancer including neck mass, persistent or recurrent sore throat, ear pain, hemoptysis, weight loss and hoarseness. If any of these symptoms recur and or persist, call for an evaluation.     D/W patient increasing caloric intake and discussed cruciferous vegetables and protein shakes etc to maintain optimal nutrition.  The necessity of abstaining from tobacco products was also discussed particularly with respect to not smoking.    Continue F/U and/or treatment with Macey

## 2021-12-28 NOTE — PROGRESS NOTES
YOB: 1945  Location: [facility]  Location Address: [address] [place] [address], [facility], [address] [place] [address]  Location Phone: [phone]    Chief Complaint   Patient presents with   • Post-op     exc SCC right side neck   • Difficulty Swallowing       History of Present Illness  Chucky Jamison is a 76 y.o. male.  Chucky Jamison is status post Excisional biopsy of right posterior inferior cervical neck mass and Right thyroidectomy and isthmusectomy with excision of right neck mass on 21. Patient continues to have hoarseness and dysphagia. He is also having fatigue and night sweats. He started chemo 21. Patient is going to do immunotherapy also.     He complains of difficulty swallowing especially with thin liquids and is also having some problems with coarse breathy voice which are problematic or less concerning to he and his wife and the difficulty swallowing.  He has improved somewhat since the immediate postoperative period.    Non-small cell lung cancer, SCC [id number] (right thyroid, skin posterior cervical subcutaneous nodule)   SCCa - left lung with skin metastasis/right thyroid metastasis S/P right thyroidectomy 21  Pathology skin nodule/left thyroid mass consistent with metastatic SCCa.  The patient was started on Keytruda and carbotaxol per Dr. Trujillo.    I have personally reviewed the information imported into the chart during this visit.      I have personally reviewed the review of systems.       Past Medical History:   Diagnosis Date   • Cataract    • COPD (chronic obstructive pulmonary disease) (HCC)    • Elevated cholesterol    • Emphysema lung (HCC)    • GERD (gastroesophageal reflux disease)    • Heart disease    • Hypercholesteremia    • Hypertension    • Lung cancer (HCC)    • Mass of right side of neck    • Mass of soft tissue of neck    • Prostate cancer (HCC)        Past Surgical History:   Procedure Laterality Date   • CATARACT EXTRACTION WITH INTRAOCULAR LENS IMPLANT  Bilateral    • CORONARY STENT PLACEMENT     • EXCISION MASS HEAD/NECK Right 11/17/2021    Procedure: Excisional biopsy of right posterior neck mass and excisional biopsy of right anterior neck mass (related to the inferior right thyroid lobe);  Surgeon: Blair Walsh MD;  Location: [facility] OR;  Service: ENT;  Laterality: Right;   • HERNIA REPAIR     • LUNG BIOPSY     • PROSTATE SURGERY         Outpatient Medications Marked as Taking for the 12/28/21 encounter (Office Visit) with Blair Walsh MD   Medication Sig Dispense Refill   • Aspirin 81 MG capsule aspirin     • azelastine (ASTELIN) 0.1 % nasal spray 1 spray into each nostril.     • BREO ELLIPTA 200-25 MCG/INH aerosol powder  INHALE 1 PUFF DAILY  1   • Cholecalciferol (VITAMIN D3) 2000 UNITS capsule Take 2 tablets by mouth Daily.     • cilostazol (PLETAL) 100 MG tablet Take 100 mg by mouth 2 (Two) Times a Day. HOLD for DOS-11/17/21  1   • Cyanocobalamin (VITAMIN B12) 1000 MCG tablet controlled-release Take 1,000 mcg by mouth Daily.     • ezetimibe (ZETIA) 10 MG tablet Take 10 mg by mouth Daily.     • folic acid (FOLVITE) 1 MG tablet Take 1 mg by mouth Daily.     • gabapentin (NEURONTIN) 100 MG capsule Take 100 mg by mouth 2 (Two) Times a Day.     • metaxalone (SKELAXIN) 800 MG tablet Take 1 tablet by mouth 2 (Two) Times a Day As Needed for muscle spasms. 60 tablet 0   • metoprolol tartrate (LOPRESSOR) 25 MG tablet TAKE 1 TABLET BY MOUTH TWICE A DAY  3   • montelukast (SINGULAIR) 10 MG tablet Take 10 mg by mouth Every Night.     • multivitamin with minerals tablet tablet Take 1 tablet by mouth Daily.     • nitroglycerin (NITROSTAT) 0.4 MG SL tablet PLACE 1 TAB UNDER TONGUE EVERY 5 MIN FOR UP TO 3 DOSES FOR CHEST PAIN **SEEK ER IF NO RELIEF**  5   • omeprazole (priLOSEC) 20 MG capsule Take 20 mg by mouth 2 (Two) Times a Day.     • rosuvastatin (CRESTOR) 20 MG tablet Take 20 mg by mouth Daily.     • traMADol (ULTRAM) 50 MG tablet Take 1 tablet by  mouth 3 (Three) Times a Day As Needed for moderate pain (4-6). 90 tablet 0       Azithromycin, Penicillins, and Shellfish-derived products    Family History   Problem Relation Age of Onset   • Heart disease Mother    • No Known Problems Father    • Cancer Brother    • Prostate cancer Brother        Social History     Socioeconomic History   • Marital status:    Tobacco Use   • Smoking status: Current Every Day Smoker     Packs/day: 0.50     Types: Cigarettes   • Smokeless tobacco: Never Used   • Tobacco comment: Trying to cut back   Vaping Use   • Vaping Use: Never used   Substance and Sexual Activity   • Alcohol use: No   • Drug use: No   • Sexual activity: Defer       Review of Systems   Constitutional: Positive for diaphoresis and fatigue.   HENT: Positive for trouble swallowing and voice change.    Eyes: Negative.    Respiratory: Negative.    Cardiovascular: Negative.    Gastrointestinal: Negative.    Endocrine: Negative.    Genitourinary: Negative.    Musculoskeletal: Negative.    Skin: Negative.    Allergic/Immunologic: Negative.    Neurological: Negative.    Hematological: Negative.    Psychiatric/Behavioral: Negative.        Vitals:    12/28/21 0900   BP: 103/71   Pulse: 82   Temp: 97.7 °F (36.5 °C)       Body mass index is 22.38 kg/m².    Objective     Physical Exam  CONSTITUTIONAL: well nourished, well-developed, alert, oriented, in no acute distress     COMMUNICATION AND VOICE: able to communicate normally, normal voice quality    HEAD: normocephalic, no lesions, atraumatic, no tenderness, no masses     FACE: appearance normal, no lesions, no tenderness, no deformities, facial motion symmetric    EYES: ocular motility normal, eyelids normal, orbits normal, no proptosis, conjunctiva normal , pupils equal, round     EARS:  Hearing: hearing to conversational voice intact bilaterally   External Ears: normal bilaterally, no lesions    NOSE:  External Nose: external nasal structure normal, no tenderness  on palpation, no nasal discharge, no lesions, no evidence of trauma, nostrils patent     ORAL:  Lips: upper and lower lips without lesion     NECK:  Inspection and Palpation: neck appearance normal, no masses or tenderness-incision is well-healed anteriorly and posteriorly.  No palpable masses are noted again    LARYNX:  See endoscopy    CHEST/RESPIRATORY: normal respiratory effort     CARDIOVASCULAR: no cyanosis or edema     NEUROLOGICAL/PSYCHIATRIC: oriented to time, place and person, mood normal, affect appropriate, CN II-XII intact grossly      Assessment/Plan   Diagnoses and all orders for this visit:    1. Thyroid carcinoma (HCC) (Primary)    2. Malignant neoplasm of upper lobe of left lung (HCC)    3. Unilateral complete paralysis of vocal cord    4. Pharyngoesophageal dysphagia      * Surgery not found *  No orders of the defined types were placed in this encounter.    Return in about 8 weeks (around 2/22/2022).       Patient Instructions     Options for treatment vocal cord paralysis associated with dysphagia were discussed at length.  My recommendation was for dysphagia study associated with speech therapy evaluation and treatment  Follow-up in 6 to 8 weeks  Continue treatment with oncology  Option for vocal cord medialization if speech therapy treatment exam and previous dysphagia was also discussed    Be aware of the signs and symptoms of recurrent head and neck cancer including neck mass, persistent or recurrent sore throat, ear pain, hemoptysis, weight loss and hoarseness. If any of these symptoms recur and or persist, call for an evaluation.     D/W patient increasing caloric intake and discussed cruciferous vegetables and protein shakes etc to maintain optimal nutrition.  The necessity of abstaining from tobacco products was also discussed particularly with respect to not smoking.    Continue F/U and/or treatment with [facility]

## 2021-12-28 NOTE — PROGRESS NOTES
OPERATIVE NOTE:  Chucky Jamison    DATE OF PROCEDURE: 12/28/2021    PROCEDURE:   Flexible Fiberoptic Laryngoscopy    ANESTHESIA:  None    REASON FOR PROCEDURE:  Procedure was recommended for persistant hoarseness and suspicious clinical behavior  Risks, benefits and alternatives were discussed.      DETAILS of OPERATION:  The patient was seated in the exam chair.  A flexible fiberoptic laryngoscopy was performed through the oral cavity.  The scope was introduced into the oral cavity and directed to the level of the glottis, examining the structures of the oropharynx, base of tongue, vallecula, supraglottic larynx, glottic larynx, and hypopharynx.      FINDINGS:  Mucosal surfaces:   The mucosal surfaces demonstrated normal mucosa surfaces with moderate inflammation    Base of tongue:  The base of tongue was found to have no mass or lesion.    Epiglottis:  The epiglottis was found to have no mass or lesion.    Aryepiglottic fold:  The AE folds were found to have no mass or lesion.    False Vocal Fold:  The false cords were found to have no mass or lesion.    True Vocal Cord:  The true vocal cords were found to have no mass or lesion.  Left true vocal cord adducts and abducts normally.  The right true vocal fold is fixed in a paramedian position.    Arytenoid:   The arytenoids were found to have no mass or lesion.    Hypopharynx:  The hypopharynx was found to have no mass or lesion.    The patient tolerated procedure well.

## 2022-01-01 ENCOUNTER — PRE-ADMISSION TESTING (OUTPATIENT)
Dept: PREADMISSION TESTING | Facility: HOSPITAL | Age: 77
End: 2022-01-01

## 2022-01-01 ENCOUNTER — TELEPHONE (OUTPATIENT)
Dept: OTOLARYNGOLOGY | Facility: CLINIC | Age: 77
End: 2022-01-01

## 2022-01-01 ENCOUNTER — APPOINTMENT (OUTPATIENT)
Dept: CARDIOLOGY | Facility: HOSPITAL | Age: 77
End: 2022-01-01

## 2022-01-01 ENCOUNTER — OFFICE VISIT (OUTPATIENT)
Dept: OTOLARYNGOLOGY | Facility: CLINIC | Age: 77
End: 2022-01-01

## 2022-01-01 ENCOUNTER — HOSPITAL ENCOUNTER (INPATIENT)
Facility: HOSPITAL | Age: 77
LOS: 1 days | Discharge: HOME | End: 2022-10-27
Attending: EMERGENCY MEDICINE | Admitting: FAMILY MEDICINE

## 2022-01-01 ENCOUNTER — TREATMENT (OUTPATIENT)
Dept: PHYSICAL THERAPY | Facility: CLINIC | Age: 77
End: 2022-01-01

## 2022-01-01 ENCOUNTER — TELEPHONE (OUTPATIENT)
Dept: RADIATION ONCOLOGY | Facility: HOSPITAL | Age: 77
End: 2022-01-01

## 2022-01-01 ENCOUNTER — HOSPITAL ENCOUNTER (OUTPATIENT)
Dept: GENERAL RADIOLOGY | Facility: HOSPITAL | Age: 77
Discharge: HOME | End: 2022-01-06
Admitting: OTOLARYNGOLOGY

## 2022-01-01 ENCOUNTER — HOSPITAL ENCOUNTER (OUTPATIENT)
Dept: GENERAL RADIOLOGY | Facility: HOSPITAL | Age: 77
Discharge: HOME | End: 2022-03-14
Admitting: NURSE PRACTITIONER

## 2022-01-01 ENCOUNTER — DOCUMENTATION (OUTPATIENT)
Dept: RADIATION ONCOLOGY | Facility: HOSPITAL | Age: 77
End: 2022-01-01

## 2022-01-01 ENCOUNTER — ANESTHESIA (OUTPATIENT)
Dept: PERIOP | Facility: HOSPITAL | Age: 77
End: 2022-01-01

## 2022-01-01 ENCOUNTER — APPOINTMENT (OUTPATIENT)
Dept: GENERAL RADIOLOGY | Facility: HOSPITAL | Age: 77
End: 2022-01-01

## 2022-01-01 ENCOUNTER — HOSPITAL ENCOUNTER (OUTPATIENT)
Facility: HOSPITAL | Age: 77
Setting detail: HOSPITAL OUTPATIENT SURGERY
Discharge: HOME | End: 2022-06-20
Attending: OTOLARYNGOLOGY | Admitting: OTOLARYNGOLOGY

## 2022-01-01 ENCOUNTER — CONSULT (OUTPATIENT)
Dept: RADIATION ONCOLOGY | Facility: HOSPITAL | Age: 77
End: 2022-01-01

## 2022-01-01 ENCOUNTER — HOSPITAL ENCOUNTER (OUTPATIENT)
Facility: HOSPITAL | Age: 77
Setting detail: HOSPITAL OUTPATIENT SURGERY
Discharge: HOME | End: 2022-10-26
Attending: OTOLARYNGOLOGY | Admitting: NURSE PRACTITIONER

## 2022-01-01 ENCOUNTER — ANESTHESIA EVENT (OUTPATIENT)
Dept: PERIOP | Facility: HOSPITAL | Age: 77
End: 2022-01-01

## 2022-01-01 ENCOUNTER — HOSPITAL ENCOUNTER (OUTPATIENT)
Dept: RADIATION ONCOLOGY | Facility: HOSPITAL | Age: 77
Setting detail: RADIATION/ONCOLOGY SERIES
End: 2022-01-01

## 2022-01-01 ENCOUNTER — TELEPHONE (OUTPATIENT)
Dept: PHYSICAL THERAPY | Facility: CLINIC | Age: 77
End: 2022-01-01

## 2022-01-01 ENCOUNTER — OFFICE VISIT (OUTPATIENT)
Dept: PHYSICAL THERAPY | Facility: CLINIC | Age: 77
End: 2022-01-01

## 2022-01-01 ENCOUNTER — HOSPITAL ENCOUNTER (OUTPATIENT)
Dept: GENERAL RADIOLOGY | Facility: HOSPITAL | Age: 77
Discharge: HOME | End: 2022-10-19

## 2022-01-01 ENCOUNTER — APPOINTMENT (OUTPATIENT)
Dept: CT IMAGING | Facility: HOSPITAL | Age: 77
End: 2022-01-01

## 2022-01-01 ENCOUNTER — LAB (OUTPATIENT)
Dept: LAB | Facility: HOSPITAL | Age: 77
End: 2022-01-01

## 2022-01-01 ENCOUNTER — READMISSION MANAGEMENT (OUTPATIENT)
Dept: CALL CENTER | Facility: HOSPITAL | Age: 77
End: 2022-01-01

## 2022-01-01 VITALS
OXYGEN SATURATION: 98 % | SYSTOLIC BLOOD PRESSURE: 116 MMHG | RESPIRATION RATE: 24 BRPM | DIASTOLIC BLOOD PRESSURE: 78 MMHG | TEMPERATURE: 97.5 F | HEART RATE: 51 BPM

## 2022-01-01 VITALS
HEIGHT: 67 IN | WEIGHT: 140 LBS | SYSTOLIC BLOOD PRESSURE: 120 MMHG | HEART RATE: 66 BPM | DIASTOLIC BLOOD PRESSURE: 77 MMHG | BODY MASS INDEX: 21.97 KG/M2 | RESPIRATION RATE: 18 BRPM | OXYGEN SATURATION: 98 %

## 2022-01-01 VITALS
HEIGHT: 66 IN | RESPIRATION RATE: 18 BRPM | SYSTOLIC BLOOD PRESSURE: 112 MMHG | DIASTOLIC BLOOD PRESSURE: 77 MMHG | OXYGEN SATURATION: 96 % | HEART RATE: 58 BPM | WEIGHT: 145.72 LBS | BODY MASS INDEX: 23.42 KG/M2

## 2022-01-01 VITALS
SYSTOLIC BLOOD PRESSURE: 112 MMHG | HEIGHT: 66 IN | OXYGEN SATURATION: 95 % | WEIGHT: 138.89 LBS | BODY MASS INDEX: 22.32 KG/M2 | RESPIRATION RATE: 18 BRPM | DIASTOLIC BLOOD PRESSURE: 76 MMHG | HEART RATE: 91 BPM

## 2022-01-01 VITALS
SYSTOLIC BLOOD PRESSURE: 99 MMHG | TEMPERATURE: 97.1 F | DIASTOLIC BLOOD PRESSURE: 63 MMHG | RESPIRATION RATE: 23 BRPM | HEART RATE: 150 BPM | OXYGEN SATURATION: 97 %

## 2022-01-01 VITALS
DIASTOLIC BLOOD PRESSURE: 82 MMHG | HEIGHT: 67 IN | SYSTOLIC BLOOD PRESSURE: 128 MMHG | BODY MASS INDEX: 22.19 KG/M2 | WEIGHT: 141.4 LBS | TEMPERATURE: 97.1 F | HEART RATE: 102 BPM

## 2022-01-01 VITALS
BODY MASS INDEX: 21.6 KG/M2 | HEIGHT: 66 IN | WEIGHT: 134.4 LBS | HEART RATE: 88 BPM | RESPIRATION RATE: 18 BRPM | DIASTOLIC BLOOD PRESSURE: 78 MMHG | SYSTOLIC BLOOD PRESSURE: 124 MMHG | OXYGEN SATURATION: 91 % | TEMPERATURE: 98.8 F

## 2022-01-01 VITALS
TEMPERATURE: 98 F | DIASTOLIC BLOOD PRESSURE: 68 MMHG | BODY MASS INDEX: 21.82 KG/M2 | HEART RATE: 97 BPM | SYSTOLIC BLOOD PRESSURE: 124 MMHG | HEIGHT: 67 IN | WEIGHT: 139 LBS

## 2022-01-01 VITALS
DIASTOLIC BLOOD PRESSURE: 77 MMHG | HEART RATE: 79 BPM | BODY MASS INDEX: 22.76 KG/M2 | SYSTOLIC BLOOD PRESSURE: 107 MMHG | HEIGHT: 67 IN | TEMPERATURE: 98.4 F | WEIGHT: 145 LBS

## 2022-01-01 VITALS
SYSTOLIC BLOOD PRESSURE: 129 MMHG | WEIGHT: 143 LBS | DIASTOLIC BLOOD PRESSURE: 76 MMHG | BODY MASS INDEX: 22.44 KG/M2 | TEMPERATURE: 97.8 F | RESPIRATION RATE: 16 BRPM | HEART RATE: 70 BPM | HEIGHT: 67 IN

## 2022-01-01 DIAGNOSIS — C76.0 SQUAMOUS CELL CARCINOMA OF HEAD AND NECK: ICD-10-CM

## 2022-01-01 DIAGNOSIS — J38.01 UNILATERAL COMPLETE PARALYSIS OF VOCAL CORD: ICD-10-CM

## 2022-01-01 DIAGNOSIS — R49.0 VOICE HOARSENESS: ICD-10-CM

## 2022-01-01 DIAGNOSIS — C73 THYROID CARCINOMA: ICD-10-CM

## 2022-01-01 DIAGNOSIS — C34.12 MALIGNANT NEOPLASM OF UPPER LOBE OF LEFT LUNG: Primary | ICD-10-CM

## 2022-01-01 DIAGNOSIS — R13.14 PHARYNGOESOPHAGEAL DYSPHAGIA: ICD-10-CM

## 2022-01-01 DIAGNOSIS — R13.14 PHARYNGOESOPHAGEAL DYSPHAGIA: Primary | ICD-10-CM

## 2022-01-01 DIAGNOSIS — R49.0 DYSPHONIA: ICD-10-CM

## 2022-01-01 DIAGNOSIS — I71.43 INFRARENAL ABDOMINAL AORTIC ANEURYSM (AAA) WITHOUT RUPTURE: ICD-10-CM

## 2022-01-01 DIAGNOSIS — C34.02 MALIGNANT NEOPLASM OF HILUS OF LEFT LUNG: ICD-10-CM

## 2022-01-01 DIAGNOSIS — N20.0 RENAL STONE: ICD-10-CM

## 2022-01-01 DIAGNOSIS — R13.12 OROPHARYNGEAL DYSPHAGIA: Primary | ICD-10-CM

## 2022-01-01 DIAGNOSIS — C76.0 SQUAMOUS CELL CARCINOMA OF HEAD AND NECK: Primary | ICD-10-CM

## 2022-01-01 DIAGNOSIS — R22.1 MASS OF RIGHT SIDE OF NECK: ICD-10-CM

## 2022-01-01 DIAGNOSIS — C34.12 MALIGNANT NEOPLASM OF UPPER LOBE OF LEFT LUNG: ICD-10-CM

## 2022-01-01 DIAGNOSIS — M79.89 MASS OF SOFT TISSUE OF NECK: ICD-10-CM

## 2022-01-01 DIAGNOSIS — Z01.818 PREOP EXAMINATION: Primary | ICD-10-CM

## 2022-01-01 DIAGNOSIS — C34.90 MALIGNANT NEOPLASM OF LUNG, UNSPECIFIED LATERALITY, UNSPECIFIED PART OF LUNG: ICD-10-CM

## 2022-01-01 DIAGNOSIS — R13.12 OROPHARYNGEAL DYSPHAGIA: ICD-10-CM

## 2022-01-01 DIAGNOSIS — Z87.891 FORMER SMOKER: ICD-10-CM

## 2022-01-01 DIAGNOSIS — R49.0 VOICE HOARSENESS: Primary | ICD-10-CM

## 2022-01-01 DIAGNOSIS — Z78.9 IMPAIRED MOBILITY AND ADLS: ICD-10-CM

## 2022-01-01 DIAGNOSIS — Z74.09 IMPAIRED MOBILITY AND ADLS: ICD-10-CM

## 2022-01-01 DIAGNOSIS — R13.10 DYSPHAGIA, UNSPECIFIED TYPE: Primary | ICD-10-CM

## 2022-01-01 DIAGNOSIS — R49.0 DYSPHONIA: Primary | ICD-10-CM

## 2022-01-01 DIAGNOSIS — K80.20 CALCULUS OF GALLBLADDER WITHOUT CHOLECYSTITIS WITHOUT OBSTRUCTION: ICD-10-CM

## 2022-01-01 DIAGNOSIS — R13.10 DYSPHAGIA, UNSPECIFIED TYPE: ICD-10-CM

## 2022-01-01 DIAGNOSIS — I48.91 ATRIAL FIBRILLATION WITH RVR: Primary | ICD-10-CM

## 2022-01-01 LAB
ALBUMIN SERPL-MCNC: 3.3 G/DL (ref 3.5–5.2)
ALBUMIN SERPL-MCNC: 3.4 G/DL (ref 3.5–5.2)
ALBUMIN SERPL-MCNC: 3.5 G/DL (ref 3.5–5.2)
ALBUMIN SERPL-MCNC: 3.6 G/DL (ref 3.5–5.2)
ALBUMIN/GLOB SERPL: 0.8 G/DL
ALBUMIN/GLOB SERPL: 0.8 G/DL
ALBUMIN/GLOB SERPL: 1 G/DL
ALBUMIN/GLOB SERPL: 1 G/DL
ALP SERPL-CCNC: 64 U/L (ref 39–117)
ALP SERPL-CCNC: 85 U/L (ref 39–117)
ALP SERPL-CCNC: 94 U/L (ref 39–117)
ALP SERPL-CCNC: 95 U/L (ref 39–117)
ALT SERPL W P-5'-P-CCNC: 12 U/L (ref 1–41)
ALT SERPL W P-5'-P-CCNC: 15 U/L (ref 1–41)
ALT SERPL W P-5'-P-CCNC: 17 U/L (ref 1–41)
ALT SERPL W P-5'-P-CCNC: 25 U/L (ref 1–41)
ANION GAP SERPL CALCULATED.3IONS-SCNC: 11 MMOL/L (ref 5–15)
ANION GAP SERPL CALCULATED.3IONS-SCNC: 13 MMOL/L (ref 5–15)
ANION GAP SERPL CALCULATED.3IONS-SCNC: 14 MMOL/L (ref 5–15)
ANION GAP SERPL CALCULATED.3IONS-SCNC: 9 MMOL/L (ref 5–15)
ARTERIAL PATENCY WRIST A: NEGATIVE
AST SERPL-CCNC: 17 U/L (ref 1–40)
AST SERPL-CCNC: 19 U/L (ref 1–40)
AST SERPL-CCNC: 21 U/L (ref 1–40)
AST SERPL-CCNC: 22 U/L (ref 1–40)
ATMOSPHERIC PRESS: 751 MMHG
BASE EXCESS BLDA CALC-SCNC: -2.1 MMOL/L (ref 0–2)
BASOPHILS # BLD AUTO: 0.03 10*3/MM3 (ref 0–0.2)
BASOPHILS # BLD AUTO: 0.06 10*3/MM3 (ref 0–0.2)
BASOPHILS # BLD AUTO: 0.06 10*3/MM3 (ref 0–0.2)
BASOPHILS NFR BLD AUTO: 0.3 % (ref 0–1.5)
BASOPHILS NFR BLD AUTO: 0.5 % (ref 0–1.5)
BASOPHILS NFR BLD AUTO: 0.6 % (ref 0–1.5)
BDY SITE: ABNORMAL
BH CV ECHO MEAS - AO MAX PG: 3.9 MMHG
BH CV ECHO MEAS - AO MEAN PG: 2 MMHG
BH CV ECHO MEAS - AO ROOT DIAM: 3 CM
BH CV ECHO MEAS - AO V2 MAX: 99.1 CM/SEC
BH CV ECHO MEAS - AO V2 VTI: 17.2 CM
BH CV ECHO MEAS - AVA(I,D): 2.9 CM2
BH CV ECHO MEAS - EDV(CUBED): 45.5 ML
BH CV ECHO MEAS - EDV(MOD-SP2): 38.1 ML
BH CV ECHO MEAS - EDV(MOD-SP4): 44.2 ML
BH CV ECHO MEAS - EF(MOD-BP): 63.3 %
BH CV ECHO MEAS - EF(MOD-SP2): 67.5 %
BH CV ECHO MEAS - EF(MOD-SP4): 57.2 %
BH CV ECHO MEAS - ESV(CUBED): 9.7 ML
BH CV ECHO MEAS - ESV(MOD-SP2): 12.4 ML
BH CV ECHO MEAS - ESV(MOD-SP4): 18.9 ML
BH CV ECHO MEAS - FS: 40.3 %
BH CV ECHO MEAS - IVS/LVPW: 1.06 CM
BH CV ECHO MEAS - IVSD: 0.93 CM
BH CV ECHO MEAS - LA DIMENSION: 3.5 CM
BH CV ECHO MEAS - LAT PEAK E' VEL: 3.1 CM/SEC
BH CV ECHO MEAS - LV DIASTOLIC VOL/BSA (35-75): 17.9 CM2
BH CV ECHO MEAS - LV MASS(C)D: 92.2 GRAMS
BH CV ECHO MEAS - LV MAX PG: 2.9 MMHG
BH CV ECHO MEAS - LV MEAN PG: 1 MMHG
BH CV ECHO MEAS - LV SYSTOLIC VOL/BSA (12-30): 7.7 CM2
BH CV ECHO MEAS - LV V1 MAX: 85.5 CM/SEC
BH CV ECHO MEAS - LV V1 VTI: 15.8 CM
BH CV ECHO MEAS - LVIDD: 3.6 CM
BH CV ECHO MEAS - LVIDS: 2.13 CM
BH CV ECHO MEAS - LVOT AREA: 3.1 CM2
BH CV ECHO MEAS - LVOT DIAM: 2 CM
BH CV ECHO MEAS - LVPWD: 0.88 CM
BH CV ECHO MEAS - MED PEAK E' VEL: 8.8 CM/SEC
BH CV ECHO MEAS - MV A MAX VEL: 66 CM/SEC
BH CV ECHO MEAS - MV DEC TIME: 0.29 MSEC
BH CV ECHO MEAS - MV E MAX VEL: 90 CM/SEC
BH CV ECHO MEAS - MV E/A: 1.36
BH CV ECHO MEAS - SI(MOD-SP2): 10.4 ML/M2
BH CV ECHO MEAS - SI(MOD-SP4): 10.3 ML/M2
BH CV ECHO MEAS - SV(LVOT): 49.6 ML
BH CV ECHO MEAS - SV(MOD-SP2): 25.7 ML
BH CV ECHO MEAS - SV(MOD-SP4): 25.3 ML
BH CV ECHO MEAS - TR MAX PG: 29.4 MMHG
BH CV ECHO MEAS - TR MAX VEL: 271 CM/SEC
BH CV ECHO MEASUREMENTS AVERAGE E/E' RATIO: 15.13
BILIRUB SERPL-MCNC: 0.2 MG/DL (ref 0–1.2)
BILIRUB SERPL-MCNC: 0.4 MG/DL (ref 0–1.2)
BODY TEMPERATURE: 37 C
BUN SERPL-MCNC: 10 MG/DL (ref 8–23)
BUN SERPL-MCNC: 13 MG/DL (ref 8–23)
BUN SERPL-MCNC: 17 MG/DL (ref 8–23)
BUN SERPL-MCNC: 8 MG/DL (ref 8–23)
BUN/CREAT SERPL: 13.2 (ref 7–25)
BUN/CREAT SERPL: 14 (ref 7–25)
BUN/CREAT SERPL: 19.1 (ref 7–25)
BUN/CREAT SERPL: 21.5 (ref 7–25)
CALCIUM SPEC-SCNC: 10.4 MG/DL (ref 8.6–10.5)
CALCIUM SPEC-SCNC: 10.5 MG/DL (ref 8.6–10.5)
CALCIUM SPEC-SCNC: 9.5 MG/DL (ref 8.6–10.5)
CALCIUM SPEC-SCNC: 9.8 MG/DL (ref 8.6–10.5)
CHLORIDE SERPL-SCNC: 102 MMOL/L (ref 98–107)
CHLORIDE SERPL-SCNC: 102 MMOL/L (ref 98–107)
CHLORIDE SERPL-SCNC: 103 MMOL/L (ref 98–107)
CHLORIDE SERPL-SCNC: 103 MMOL/L (ref 98–107)
CHOLEST SERPL-MCNC: 88 MG/DL (ref 0–200)
CO2 SERPL-SCNC: 23 MMOL/L (ref 22–29)
CO2 SERPL-SCNC: 24 MMOL/L (ref 22–29)
CO2 SERPL-SCNC: 25 MMOL/L (ref 22–29)
CO2 SERPL-SCNC: 26 MMOL/L (ref 22–29)
CREAT SERPL-MCNC: 0.57 MG/DL (ref 0.76–1.27)
CREAT SERPL-MCNC: 0.68 MG/DL (ref 0.76–1.27)
CREAT SERPL-MCNC: 0.76 MG/DL (ref 0.76–1.27)
CREAT SERPL-MCNC: 0.79 MG/DL (ref 0.76–1.27)
D DIMER PPP FEU-MCNC: 3.76 MCGFEU/ML (ref 0–0.5)
D-LACTATE SERPL-SCNC: 1.5 MMOL/L (ref 0.5–2)
D-LACTATE SERPL-SCNC: 2.1 MMOL/L (ref 0.5–2)
D-LACTATE SERPL-SCNC: 2.4 MMOL/L (ref 0.5–2)
DEPRECATED RDW RBC AUTO: 54.4 FL (ref 37–54)
DEPRECATED RDW RBC AUTO: 54.5 FL (ref 37–54)
DEPRECATED RDW RBC AUTO: 56 FL (ref 37–54)
DEPRECATED RDW RBC AUTO: 56.3 FL (ref 37–54)
EGFRCR SERPLBLD CKD-EPI 2021: 102.2 ML/MIN/1.73
EGFRCR SERPLBLD CKD-EPI 2021: 93.2 ML/MIN/1.73
EGFRCR SERPLBLD CKD-EPI 2021: 93.7 ML/MIN/1.73
EGFRCR SERPLBLD CKD-EPI 2021: 96.9 ML/MIN/1.73
EOSINOPHIL # BLD AUTO: 0 10*3/MM3 (ref 0–0.4)
EOSINOPHIL # BLD AUTO: 0.01 10*3/MM3 (ref 0–0.4)
EOSINOPHIL # BLD AUTO: 0.04 10*3/MM3 (ref 0–0.4)
EOSINOPHIL NFR BLD AUTO: 0 % (ref 0.3–6.2)
EOSINOPHIL NFR BLD AUTO: 0.1 % (ref 0.3–6.2)
EOSINOPHIL NFR BLD AUTO: 0.4 % (ref 0.3–6.2)
ERYTHROCYTE [DISTWIDTH] IN BLOOD BY AUTOMATED COUNT: 15.1 % (ref 12.3–15.4)
ERYTHROCYTE [DISTWIDTH] IN BLOOD BY AUTOMATED COUNT: 15.9 % (ref 12.3–15.4)
ERYTHROCYTE [DISTWIDTH] IN BLOOD BY AUTOMATED COUNT: 15.9 % (ref 12.3–15.4)
ERYTHROCYTE [DISTWIDTH] IN BLOOD BY AUTOMATED COUNT: 16.1 % (ref 12.3–15.4)
GLOBULIN UR ELPH-MCNC: 3.5 GM/DL
GLOBULIN UR ELPH-MCNC: 3.6 GM/DL
GLOBULIN UR ELPH-MCNC: 4 GM/DL
GLOBULIN UR ELPH-MCNC: 4.1 GM/DL
GLUCOSE SERPL-MCNC: 79 MG/DL (ref 65–99)
GLUCOSE SERPL-MCNC: 91 MG/DL (ref 65–99)
GLUCOSE SERPL-MCNC: 94 MG/DL (ref 65–99)
GLUCOSE SERPL-MCNC: 95 MG/DL (ref 65–99)
HBA1C MFR BLD: 5.2 % (ref 4.8–5.6)
HCO3 BLDA-SCNC: 20.8 MMOL/L (ref 20–26)
HCT VFR BLD AUTO: 33 % (ref 37.5–51)
HCT VFR BLD AUTO: 36.4 % (ref 37.5–51)
HCT VFR BLD AUTO: 37.9 % (ref 37.5–51)
HCT VFR BLD AUTO: 38.9 % (ref 37.5–51)
HDLC SERPL-MCNC: 30 MG/DL (ref 40–60)
HGB BLD-MCNC: 10 G/DL (ref 13–17.7)
HGB BLD-MCNC: 11.3 G/DL (ref 13–17.7)
HGB BLD-MCNC: 12 G/DL (ref 13–17.7)
HGB BLD-MCNC: 12.1 G/DL (ref 13–17.7)
IMM GRANULOCYTES # BLD AUTO: 0.06 10*3/MM3 (ref 0–0.05)
IMM GRANULOCYTES # BLD AUTO: 0.08 10*3/MM3 (ref 0–0.05)
IMM GRANULOCYTES # BLD AUTO: 0.17 10*3/MM3 (ref 0–0.05)
IMM GRANULOCYTES NFR BLD AUTO: 0.6 % (ref 0–0.5)
IMM GRANULOCYTES NFR BLD AUTO: 0.7 % (ref 0–0.5)
IMM GRANULOCYTES NFR BLD AUTO: 1.4 % (ref 0–0.5)
LDLC SERPL CALC-MCNC: 42 MG/DL (ref 0–100)
LDLC/HDLC SERPL: 1.41 {RATIO}
LEFT ATRIUM VOLUME INDEX: 9.1 ML/M2
LEFT ATRIUM VOLUME: 22.4 ML
LYMPHOCYTES # BLD AUTO: 1.19 10*3/MM3 (ref 0.7–3.1)
LYMPHOCYTES # BLD AUTO: 1.43 10*3/MM3 (ref 0.7–3.1)
LYMPHOCYTES # BLD AUTO: 1.54 10*3/MM3 (ref 0.7–3.1)
LYMPHOCYTES NFR BLD AUTO: 10 % (ref 19.6–45.3)
LYMPHOCYTES NFR BLD AUTO: 12.2 % (ref 19.6–45.3)
LYMPHOCYTES NFR BLD AUTO: 16.1 % (ref 19.6–45.3)
Lab: ABNORMAL
MAGNESIUM SERPL-MCNC: 1.8 MG/DL (ref 1.6–2.4)
MAXIMAL PREDICTED HEART RATE: 145 BPM
MCH RBC QN AUTO: 28.8 PG (ref 26.6–33)
MCH RBC QN AUTO: 29 PG (ref 26.6–33)
MCH RBC QN AUTO: 29.3 PG (ref 26.6–33)
MCH RBC QN AUTO: 32.1 PG (ref 26.6–33)
MCHC RBC AUTO-ENTMCNC: 30.3 G/DL (ref 31.5–35.7)
MCHC RBC AUTO-ENTMCNC: 31 G/DL (ref 31.5–35.7)
MCHC RBC AUTO-ENTMCNC: 31.1 G/DL (ref 31.5–35.7)
MCHC RBC AUTO-ENTMCNC: 31.7 G/DL (ref 31.5–35.7)
MCV RBC AUTO: 101.3 FL (ref 79–97)
MCV RBC AUTO: 93.3 FL (ref 79–97)
MCV RBC AUTO: 94.2 FL (ref 79–97)
MCV RBC AUTO: 95.1 FL (ref 79–97)
MODALITY: ABNORMAL
MONOCYTES # BLD AUTO: 0.39 10*3/MM3 (ref 0.1–0.9)
MONOCYTES # BLD AUTO: 0.63 10*3/MM3 (ref 0.1–0.9)
MONOCYTES # BLD AUTO: 0.75 10*3/MM3 (ref 0.1–0.9)
MONOCYTES NFR BLD AUTO: 3.3 % (ref 5–12)
MONOCYTES NFR BLD AUTO: 5.4 % (ref 5–12)
MONOCYTES NFR BLD AUTO: 7.8 % (ref 5–12)
NEUTROPHILS NFR BLD AUTO: 10.15 10*3/MM3 (ref 1.7–7)
NEUTROPHILS NFR BLD AUTO: 7.14 10*3/MM3 (ref 1.7–7)
NEUTROPHILS NFR BLD AUTO: 74.5 % (ref 42.7–76)
NEUTROPHILS NFR BLD AUTO: 81.2 % (ref 42.7–76)
NEUTROPHILS NFR BLD AUTO: 84.9 % (ref 42.7–76)
NEUTROPHILS NFR BLD AUTO: 9.48 10*3/MM3 (ref 1.7–7)
NRBC BLD AUTO-RTO: 0 /100 WBC (ref 0–0.2)
NT-PROBNP SERPL-MCNC: 1900 PG/ML (ref 0–1800)
PCO2 BLDA: 29.3 MM HG (ref 35–45)
PCO2 TEMP ADJ BLD: 29.3 MM HG (ref 35–45)
PH BLDA: 7.46 PH UNITS (ref 7.35–7.45)
PH, TEMP CORRECTED: 7.46 PH UNITS (ref 7.35–7.45)
PLATELET # BLD AUTO: 232 10*3/MM3 (ref 140–450)
PLATELET # BLD AUTO: 276 10*3/MM3 (ref 140–450)
PLATELET # BLD AUTO: 277 10*3/MM3 (ref 140–450)
PLATELET # BLD AUTO: 289 10*3/MM3 (ref 140–450)
PMV BLD AUTO: 10.1 FL (ref 6–12)
PMV BLD AUTO: 8.6 FL (ref 6–12)
PMV BLD AUTO: 9.2 FL (ref 6–12)
PMV BLD AUTO: 9.3 FL (ref 6–12)
PO2 BLDA: 83.4 MM HG (ref 83–108)
PO2 TEMP ADJ BLD: 83.4 MM HG (ref 83–108)
POTASSIUM SERPL-SCNC: 3.7 MMOL/L (ref 3.5–5.2)
POTASSIUM SERPL-SCNC: 3.8 MMOL/L (ref 3.5–5.2)
POTASSIUM SERPL-SCNC: 4.1 MMOL/L (ref 3.5–5.2)
POTASSIUM SERPL-SCNC: 4.3 MMOL/L (ref 3.5–5.2)
PROCALCITONIN SERPL-MCNC: 0.05 NG/ML (ref 0–0.25)
PROT SERPL-MCNC: 7 G/DL (ref 6–8.5)
PROT SERPL-MCNC: 7.2 G/DL (ref 6–8.5)
PROT SERPL-MCNC: 7.3 G/DL (ref 6–8.5)
PROT SERPL-MCNC: 7.5 G/DL (ref 6–8.5)
QT INTERVAL: 270 MS
QT INTERVAL: 282 MS
QT INTERVAL: 354 MS
QT INTERVAL: 410 MS
QTC INTERVAL: 384 MS
QTC INTERVAL: 407 MS
QTC INTERVAL: 421 MS
QTC INTERVAL: 460 MS
RBC # BLD AUTO: 3.47 10*6/MM3 (ref 4.14–5.8)
RBC # BLD AUTO: 3.74 10*6/MM3 (ref 4.14–5.8)
RBC # BLD AUTO: 3.9 10*6/MM3 (ref 4.14–5.8)
RBC # BLD AUTO: 4.13 10*6/MM3 (ref 4.14–5.8)
SAO2 % BLDCOA: 97.3 % (ref 94–99)
SARS-COV-2 ORF1AB RESP QL NAA+PROBE: NOT DETECTED
SODIUM SERPL-SCNC: 138 MMOL/L (ref 136–145)
SODIUM SERPL-SCNC: 138 MMOL/L (ref 136–145)
SODIUM SERPL-SCNC: 139 MMOL/L (ref 136–145)
SODIUM SERPL-SCNC: 140 MMOL/L (ref 136–145)
STRESS TARGET HR: 123 BPM
T4 FREE SERPL-MCNC: 1.3 NG/DL (ref 0.93–1.7)
TRIGL SERPL-MCNC: 79 MG/DL (ref 0–150)
TROPONIN T SERPL-MCNC: <0.01 NG/ML (ref 0–0.03)
TSH SERPL DL<=0.05 MIU/L-ACNC: 4.3 UIU/ML (ref 0.27–4.2)
VENTILATOR MODE: ABNORMAL
VLDLC SERPL-MCNC: 16 MG/DL (ref 5–40)
WBC NRBC COR # BLD: 10.69 10*3/MM3 (ref 3.4–10.8)
WBC NRBC COR # BLD: 11.68 10*3/MM3 (ref 3.4–10.8)
WBC NRBC COR # BLD: 11.94 10*3/MM3 (ref 3.4–10.8)
WBC NRBC COR # BLD: 9.59 10*3/MM3 (ref 3.4–10.8)

## 2022-01-01 PROCEDURE — 99221 1ST HOSP IP/OBS SF/LOW 40: CPT | Performed by: NURSE PRACTITIONER

## 2022-01-01 PROCEDURE — 31575 DIAGNOSTIC LARYNGOSCOPY: CPT | Performed by: OTOLARYNGOLOGY

## 2022-01-01 PROCEDURE — 93005 ELECTROCARDIOGRAM TRACING: CPT | Performed by: EMERGENCY MEDICINE

## 2022-01-01 PROCEDURE — 84439 ASSAY OF FREE THYROXINE: CPT | Performed by: FAMILY MEDICINE

## 2022-01-01 PROCEDURE — 99213 OFFICE O/P EST LOW 20 MIN: CPT | Performed by: OTOLARYNGOLOGY

## 2022-01-01 PROCEDURE — 93010 ELECTROCARDIOGRAM REPORT: CPT | Performed by: INTERNAL MEDICINE

## 2022-01-01 PROCEDURE — 94664 DEMO&/EVAL PT USE INHALER: CPT

## 2022-01-01 PROCEDURE — C9803 HOPD COVID-19 SPEC COLLECT: HCPCS

## 2022-01-01 PROCEDURE — 99214 OFFICE O/P EST MOD 30 MIN: CPT | Performed by: NURSE PRACTITIONER

## 2022-01-01 PROCEDURE — G0463 HOSPITAL OUTPT CLINIC VISIT: HCPCS | Performed by: OTOLARYNGOLOGY

## 2022-01-01 PROCEDURE — 25010000002 ENOXAPARIN PER 10 MG: Performed by: FAMILY MEDICINE

## 2022-01-01 PROCEDURE — 94799 UNLISTED PULMONARY SVC/PX: CPT

## 2022-01-01 PROCEDURE — 83036 HEMOGLOBIN GLYCOSYLATED A1C: CPT | Performed by: FAMILY MEDICINE

## 2022-01-01 PROCEDURE — 92611 MOTION FLUOROSCOPY/SWALLOW: CPT

## 2022-01-01 PROCEDURE — 80061 LIPID PANEL: CPT | Performed by: FAMILY MEDICINE

## 2022-01-01 PROCEDURE — G0463 HOSPITAL OUTPT CLINIC VISIT: HCPCS | Performed by: RADIOLOGY

## 2022-01-01 PROCEDURE — 80053 COMPREHEN METABOLIC PANEL: CPT | Performed by: FAMILY MEDICINE

## 2022-01-01 PROCEDURE — 74230 X-RAY XM SWLNG FUNCJ C+: CPT

## 2022-01-01 PROCEDURE — 99213 OFFICE O/P EST LOW 20 MIN: CPT | Performed by: NURSE PRACTITIONER

## 2022-01-01 PROCEDURE — 94640 AIRWAY INHALATION TREATMENT: CPT

## 2022-01-01 PROCEDURE — 74220 X-RAY XM ESOPHAGUS 1CNTRST: CPT

## 2022-01-01 PROCEDURE — 97166 OT EVAL MOD COMPLEX 45 MIN: CPT | Performed by: OCCUPATIONAL THERAPIST

## 2022-01-01 PROCEDURE — 92610 EVALUATE SWALLOWING FUNCTION: CPT

## 2022-01-01 PROCEDURE — 0 IOPAMIDOL PER 1 ML: Performed by: EMERGENCY MEDICINE

## 2022-01-01 PROCEDURE — 83735 ASSAY OF MAGNESIUM: CPT | Performed by: EMERGENCY MEDICINE

## 2022-01-01 PROCEDURE — 84145 PROCALCITONIN (PCT): CPT | Performed by: EMERGENCY MEDICINE

## 2022-01-01 PROCEDURE — 92610 EVALUATE SWALLOWING FUNCTION: CPT | Performed by: SPEECH-LANGUAGE PATHOLOGIST

## 2022-01-01 PROCEDURE — 92526 ORAL FUNCTION THERAPY: CPT | Performed by: SPEECH-LANGUAGE PATHOLOGIST

## 2022-01-01 PROCEDURE — 93005 ELECTROCARDIOGRAM TRACING: CPT | Performed by: ANESTHESIOLOGY

## 2022-01-01 PROCEDURE — 85025 COMPLETE CBC W/AUTO DIFF WBC: CPT

## 2022-01-01 PROCEDURE — U0004 COV-19 TEST NON-CDC HGH THRU: HCPCS

## 2022-01-01 PROCEDURE — 36415 COLL VENOUS BLD VENIPUNCTURE: CPT | Performed by: EMERGENCY MEDICINE

## 2022-01-01 PROCEDURE — 85025 COMPLETE CBC W/AUTO DIFF WBC: CPT | Performed by: FAMILY MEDICINE

## 2022-01-01 PROCEDURE — 83880 ASSAY OF NATRIURETIC PEPTIDE: CPT | Performed by: EMERGENCY MEDICINE

## 2022-01-01 PROCEDURE — 0 HYDROMORPHONE 1 MG/ML SOLUTION: Performed by: FAMILY MEDICINE

## 2022-01-01 PROCEDURE — C1878 MATRL FOR VOCAL CORD: HCPCS | Performed by: OTOLARYNGOLOGY

## 2022-01-01 PROCEDURE — 82803 BLOOD GASES ANY COMBINATION: CPT

## 2022-01-01 PROCEDURE — 25010000002 FENTANYL CITRATE (PF) 50 MCG/ML SOLUTION: Performed by: EMERGENCY MEDICINE

## 2022-01-01 PROCEDURE — 93005 ELECTROCARDIOGRAM TRACING: CPT

## 2022-01-01 PROCEDURE — 93306 TTE W/DOPPLER COMPLETE: CPT | Performed by: INTERNAL MEDICINE

## 2022-01-01 PROCEDURE — 31571 LARYNGOSCOP W/VC INJ + SCOPE: CPT | Performed by: OTOLARYNGOLOGY

## 2022-01-01 PROCEDURE — 80053 COMPREHEN METABOLIC PANEL: CPT | Performed by: NURSE PRACTITIONER

## 2022-01-01 PROCEDURE — 92507 TX SP LANG VOICE COMM INDIV: CPT | Performed by: SPEECH-LANGUAGE PATHOLOGIST

## 2022-01-01 PROCEDURE — 71046 X-RAY EXAM CHEST 2 VIEWS: CPT

## 2022-01-01 PROCEDURE — 85379 FIBRIN DEGRADATION QUANT: CPT | Performed by: EMERGENCY MEDICINE

## 2022-01-01 PROCEDURE — 84484 ASSAY OF TROPONIN QUANT: CPT | Performed by: EMERGENCY MEDICINE

## 2022-01-01 PROCEDURE — 85027 COMPLETE CBC AUTOMATED: CPT | Performed by: NURSE PRACTITIONER

## 2022-01-01 PROCEDURE — 83605 ASSAY OF LACTIC ACID: CPT | Performed by: EMERGENCY MEDICINE

## 2022-01-01 PROCEDURE — 84443 ASSAY THYROID STIM HORMONE: CPT | Performed by: FAMILY MEDICINE

## 2022-01-01 PROCEDURE — 99285 EMERGENCY DEPT VISIT HI MDM: CPT

## 2022-01-01 PROCEDURE — 25010000002 AMIODARONE IN DEXTROSE 5% 150-4.21 MG/100ML-% SOLUTION: Performed by: EMERGENCY MEDICINE

## 2022-01-01 PROCEDURE — 80053 COMPREHEN METABOLIC PANEL: CPT | Performed by: EMERGENCY MEDICINE

## 2022-01-01 PROCEDURE — 71275 CT ANGIOGRAPHY CHEST: CPT

## 2022-01-01 PROCEDURE — 0 HYDROMORPHONE 1 MG/ML SOLUTION: Performed by: EMERGENCY MEDICINE

## 2022-01-01 PROCEDURE — 25010000002 ONDANSETRON PER 1 MG: Performed by: EMERGENCY MEDICINE

## 2022-01-01 PROCEDURE — 25010000002 ENOXAPARIN PER 10 MG: Performed by: EMERGENCY MEDICINE

## 2022-01-01 PROCEDURE — 25010000002 MAGNESIUM SULFATE 2 GM/50ML SOLUTION: Performed by: EMERGENCY MEDICINE

## 2022-01-01 PROCEDURE — 25010000002 PROPOFOL 10 MG/ML EMULSION: Performed by: NURSE ANESTHETIST, CERTIFIED REGISTERED

## 2022-01-01 PROCEDURE — 25010000002 DEXAMETHASONE PER 1 MG: Performed by: NURSE ANESTHETIST, CERTIFIED REGISTERED

## 2022-01-01 PROCEDURE — 74176 CT ABD & PELVIS W/O CONTRAST: CPT

## 2022-01-01 PROCEDURE — 71045 X-RAY EXAM CHEST 1 VIEW: CPT

## 2022-01-01 PROCEDURE — 93005 ELECTROCARDIOGRAM TRACING: CPT | Performed by: NURSE PRACTITIONER

## 2022-01-01 PROCEDURE — 36600 WITHDRAWAL OF ARTERIAL BLOOD: CPT

## 2022-01-01 PROCEDURE — 36415 COLL VENOUS BLD VENIPUNCTURE: CPT

## 2022-01-01 PROCEDURE — 93306 TTE W/DOPPLER COMPLETE: CPT

## 2022-01-01 PROCEDURE — 25010000002 ONDANSETRON PER 1 MG: Performed by: NURSE ANESTHETIST, CERTIFIED REGISTERED

## 2022-01-01 PROCEDURE — 80053 COMPREHEN METABOLIC PANEL: CPT

## 2022-01-01 PROCEDURE — 85025 COMPLETE CBC W/AUTO DIFF WBC: CPT | Performed by: EMERGENCY MEDICINE

## 2022-01-01 PROCEDURE — 94618 PULMONARY STRESS TESTING: CPT

## 2022-01-01 DEVICE — PROLARYN PLUS IS A WATER BASED INJECTABLE GEL IMPLANT USED TO TREAT VOCAL FOLD INSUFFICIENCY. THE PRINCIPLE COMPONENT OF PROLARYN PLUS IS SYNTHETIC CALCIUM HYDROXYAPATITE, A BIOMATERIAL FOUND IN BONE AND TEETH. INJECTION WITH PROLARYN PLUS AUGMENTS OR BULKS UPS THE DISPLACED OR DAMAGED VOCAL FOLD SO THAT IT CAN IMPROVE SPEAKING. THE RESULT IS LONG TERM RESTORATION AND AUGMENTATION. PROLARYN PLUS CAN BE INJECTED WITH A 26 GAUGE OR LARGER DIAMETER THIN-WALL-NEEDLE.
Type: IMPLANTABLE DEVICE | Site: THROAT | Status: FUNCTIONAL
Brand: PROLARYN PLUS INJECTABLE IMPLANT

## 2022-01-01 RX ORDER — ENOXAPARIN SODIUM 100 MG/ML
1 INJECTION SUBCUTANEOUS EVERY 12 HOURS SCHEDULED
Status: DISCONTINUED | OUTPATIENT
Start: 2022-01-01 | End: 2022-01-01

## 2022-01-01 RX ORDER — SODIUM CHLORIDE, SODIUM LACTATE, POTASSIUM CHLORIDE, CALCIUM CHLORIDE 600; 310; 30; 20 MG/100ML; MG/100ML; MG/100ML; MG/100ML
75 INJECTION, SOLUTION INTRAVENOUS CONTINUOUS
Status: DISCONTINUED | OUTPATIENT
Start: 2022-01-01 | End: 2022-01-01

## 2022-01-01 RX ORDER — SODIUM CHLORIDE 0.9 % (FLUSH) 0.9 %
3 SYRINGE (ML) INJECTION EVERY 12 HOURS SCHEDULED
Status: DISCONTINUED | OUTPATIENT
Start: 2022-01-01 | End: 2022-01-01 | Stop reason: HOSPADM

## 2022-01-01 RX ORDER — NITROGLYCERIN 0.4 MG/1
0.4 TABLET SUBLINGUAL
Status: DISCONTINUED | OUTPATIENT
Start: 2022-01-01 | End: 2022-01-01 | Stop reason: HOSPADM

## 2022-01-01 RX ORDER — CHOLECALCIFEROL (VITAMIN D3) 125 MCG
5 CAPSULE ORAL NIGHTLY
COMMUNITY

## 2022-01-01 RX ORDER — ENOXAPARIN SODIUM 100 MG/ML
1 INJECTION SUBCUTANEOUS ONCE
Status: COMPLETED | OUTPATIENT
Start: 2022-01-01 | End: 2022-01-01

## 2022-01-01 RX ORDER — GABAPENTIN 300 MG/1
300 CAPSULE ORAL 2 TIMES DAILY
COMMUNITY
Start: 2022-01-01

## 2022-01-01 RX ORDER — TRAMADOL HYDROCHLORIDE 50 MG/1
50 TABLET ORAL 3 TIMES DAILY PRN
Status: DISCONTINUED | OUTPATIENT
Start: 2022-01-01 | End: 2022-01-01 | Stop reason: HOSPADM

## 2022-01-01 RX ORDER — BUDESONIDE AND FORMOTEROL FUMARATE DIHYDRATE 160; 4.5 UG/1; UG/1
2 AEROSOL RESPIRATORY (INHALATION)
Status: DISCONTINUED | OUTPATIENT
Start: 2022-01-01 | End: 2022-01-01 | Stop reason: HOSPADM

## 2022-01-01 RX ORDER — PROPOFOL 10 MG/ML
VIAL (ML) INTRAVENOUS AS NEEDED
Status: DISCONTINUED | OUTPATIENT
Start: 2022-01-01 | End: 2022-01-01 | Stop reason: SURG

## 2022-01-01 RX ORDER — SODIUM CHLORIDE 0.9 % (FLUSH) 0.9 %
10 SYRINGE (ML) INJECTION AS NEEDED
Status: DISCONTINUED | OUTPATIENT
Start: 2022-01-01 | End: 2022-01-01 | Stop reason: HOSPADM

## 2022-01-01 RX ORDER — LIDOCAINE HYDROCHLORIDE 10 MG/ML
0.5 INJECTION, SOLUTION EPIDURAL; INFILTRATION; INTRACAUDAL; PERINEURAL ONCE AS NEEDED
Status: DISCONTINUED | OUTPATIENT
Start: 2022-01-01 | End: 2022-01-01 | Stop reason: HOSPADM

## 2022-01-01 RX ORDER — MAGNESIUM HYDROXIDE 1200 MG/15ML
LIQUID ORAL AS NEEDED
Status: DISCONTINUED | OUTPATIENT
Start: 2022-01-01 | End: 2022-01-01 | Stop reason: HOSPADM

## 2022-01-01 RX ORDER — OXYCODONE AND ACETAMINOPHEN 10; 325 MG/1; MG/1
1 TABLET ORAL ONCE AS NEEDED
Status: DISCONTINUED | OUTPATIENT
Start: 2022-01-01 | End: 2022-01-01 | Stop reason: HOSPADM

## 2022-01-01 RX ORDER — ROSUVASTATIN CALCIUM 20 MG/1
20 TABLET, COATED ORAL DAILY
Status: DISCONTINUED | OUTPATIENT
Start: 2022-01-01 | End: 2022-01-01 | Stop reason: HOSPADM

## 2022-01-01 RX ORDER — NALOXONE HYDROCHLORIDE 4 MG/.1ML
SPRAY NASAL
COMMUNITY

## 2022-01-01 RX ORDER — FENTANYL CITRATE 50 UG/ML
25 INJECTION, SOLUTION INTRAMUSCULAR; INTRAVENOUS ONCE
Status: COMPLETED | OUTPATIENT
Start: 2022-01-01 | End: 2022-01-01

## 2022-01-01 RX ORDER — ONDANSETRON 2 MG/ML
4 INJECTION INTRAMUSCULAR; INTRAVENOUS EVERY 6 HOURS PRN
Status: DISCONTINUED | OUTPATIENT
Start: 2022-01-01 | End: 2022-01-01 | Stop reason: HOSPADM

## 2022-01-01 RX ORDER — NALOXONE HCL 0.4 MG/ML
0.4 VIAL (ML) INJECTION AS NEEDED
Status: DISCONTINUED | OUTPATIENT
Start: 2022-01-01 | End: 2022-01-01 | Stop reason: HOSPADM

## 2022-01-01 RX ORDER — DROPERIDOL 2.5 MG/ML
0.62 INJECTION, SOLUTION INTRAMUSCULAR; INTRAVENOUS ONCE AS NEEDED
Status: DISCONTINUED | OUTPATIENT
Start: 2022-01-01 | End: 2022-01-01 | Stop reason: HOSPADM

## 2022-01-01 RX ORDER — DILTIAZEM HCL IN NACL,ISO-OSM 125 MG/125
5-15 PLASTIC BAG, INJECTION (ML) INTRAVENOUS
Status: DISCONTINUED | OUTPATIENT
Start: 2022-01-01 | End: 2022-01-01

## 2022-01-01 RX ORDER — ONDANSETRON 4 MG/1
4 TABLET, FILM COATED ORAL
COMMUNITY

## 2022-01-01 RX ORDER — ONDANSETRON 2 MG/ML
INJECTION INTRAMUSCULAR; INTRAVENOUS AS NEEDED
Status: DISCONTINUED | OUTPATIENT
Start: 2022-01-01 | End: 2022-01-01 | Stop reason: SURG

## 2022-01-01 RX ORDER — SODIUM CHLORIDE, SODIUM LACTATE, POTASSIUM CHLORIDE, CALCIUM CHLORIDE 600; 310; 30; 20 MG/100ML; MG/100ML; MG/100ML; MG/100ML
100 INJECTION, SOLUTION INTRAVENOUS CONTINUOUS
Status: DISCONTINUED | OUTPATIENT
Start: 2022-01-01 | End: 2022-01-01 | Stop reason: HOSPADM

## 2022-01-01 RX ORDER — MONTELUKAST SODIUM 10 MG/1
10 TABLET ORAL NIGHTLY
Status: DISCONTINUED | OUTPATIENT
Start: 2022-01-01 | End: 2022-01-01 | Stop reason: HOSPADM

## 2022-01-01 RX ORDER — DEXAMETHASONE SODIUM PHOSPHATE 4 MG/ML
INJECTION, SOLUTION INTRA-ARTICULAR; INTRALESIONAL; INTRAMUSCULAR; INTRAVENOUS; SOFT TISSUE AS NEEDED
Status: DISCONTINUED | OUTPATIENT
Start: 2022-01-01 | End: 2022-01-01 | Stop reason: SURG

## 2022-01-01 RX ORDER — ONDANSETRON 4 MG/1
4 TABLET, FILM COATED ORAL ONCE AS NEEDED
Status: DISCONTINUED | OUTPATIENT
Start: 2022-01-01 | End: 2022-01-01 | Stop reason: HOSPADM

## 2022-01-01 RX ORDER — ONDANSETRON 2 MG/ML
4 INJECTION INTRAMUSCULAR; INTRAVENOUS ONCE AS NEEDED
Status: DISCONTINUED | OUTPATIENT
Start: 2022-01-01 | End: 2022-01-01 | Stop reason: HOSPADM

## 2022-01-01 RX ORDER — METAXALONE 800 MG/1
800 TABLET ORAL 2 TIMES DAILY PRN
Status: DISCONTINUED | OUTPATIENT
Start: 2022-01-01 | End: 2022-01-01 | Stop reason: HOSPADM

## 2022-01-01 RX ORDER — SODIUM CHLORIDE, SODIUM LACTATE, POTASSIUM CHLORIDE, CALCIUM CHLORIDE 600; 310; 30; 20 MG/100ML; MG/100ML; MG/100ML; MG/100ML
1000 INJECTION, SOLUTION INTRAVENOUS CONTINUOUS
Status: DISCONTINUED | OUTPATIENT
Start: 2022-01-01 | End: 2022-01-01 | Stop reason: HOSPADM

## 2022-01-01 RX ORDER — LANOLIN ALCOHOL/MO/W.PET/CERES
400 CREAM (GRAM) TOPICAL DAILY
Status: DISCONTINUED | OUTPATIENT
Start: 2022-01-01 | End: 2022-01-01 | Stop reason: HOSPADM

## 2022-01-01 RX ORDER — OXYCODONE AND ACETAMINOPHEN 7.5; 325 MG/1; MG/1
1 TABLET ORAL EVERY 6 HOURS PRN
Status: DISCONTINUED | OUTPATIENT
Start: 2022-01-01 | End: 2022-01-01 | Stop reason: HOSPADM

## 2022-01-01 RX ORDER — SODIUM CHLORIDE 0.9 % (FLUSH) 0.9 %
3-10 SYRINGE (ML) INJECTION AS NEEDED
Status: DISCONTINUED | OUTPATIENT
Start: 2022-01-01 | End: 2022-01-01 | Stop reason: HOSPADM

## 2022-01-01 RX ORDER — MEGESTROL ACETATE 40 MG/ML
SUSPENSION ORAL
COMMUNITY
Start: 2022-01-01

## 2022-01-01 RX ORDER — FAMOTIDINE 10 MG/ML
20 INJECTION, SOLUTION INTRAVENOUS EVERY 12 HOURS SCHEDULED
Status: DISCONTINUED | OUTPATIENT
Start: 2022-01-01 | End: 2022-01-01 | Stop reason: HOSPADM

## 2022-01-01 RX ORDER — ROCURONIUM BROMIDE 10 MG/ML
INJECTION, SOLUTION INTRAVENOUS AS NEEDED
Status: DISCONTINUED | OUTPATIENT
Start: 2022-01-01 | End: 2022-01-01 | Stop reason: SURG

## 2022-01-01 RX ORDER — LORAZEPAM 0.5 MG/1
0.5 TABLET ORAL EVERY 6 HOURS PRN
Status: DISCONTINUED | OUTPATIENT
Start: 2022-01-01 | End: 2022-01-01 | Stop reason: HOSPADM

## 2022-01-01 RX ORDER — SODIUM CHLORIDE 0.9 % (FLUSH) 0.9 %
10 SYRINGE (ML) INJECTION EVERY 12 HOURS SCHEDULED
Status: DISCONTINUED | OUTPATIENT
Start: 2022-01-01 | End: 2022-01-01 | Stop reason: HOSPADM

## 2022-01-01 RX ORDER — LORAZEPAM 0.5 MG/1
TABLET ORAL
COMMUNITY

## 2022-01-01 RX ORDER — SODIUM CHLORIDE 0.9 % (FLUSH) 0.9 %
3 SYRINGE (ML) INJECTION AS NEEDED
Status: DISCONTINUED | OUTPATIENT
Start: 2022-01-01 | End: 2022-01-01 | Stop reason: HOSPADM

## 2022-01-01 RX ORDER — ASPIRIN 81 MG/1
81 TABLET ORAL DAILY
Status: DISCONTINUED | OUTPATIENT
Start: 2022-01-01 | End: 2022-01-01 | Stop reason: HOSPADM

## 2022-01-01 RX ORDER — LABETALOL HYDROCHLORIDE 5 MG/ML
5 INJECTION, SOLUTION INTRAVENOUS
Status: DISCONTINUED | OUTPATIENT
Start: 2022-01-01 | End: 2022-01-01 | Stop reason: HOSPADM

## 2022-01-01 RX ORDER — ONDANSETRON 2 MG/ML
4 INJECTION INTRAMUSCULAR; INTRAVENOUS ONCE
Status: COMPLETED | OUTPATIENT
Start: 2022-01-01 | End: 2022-01-01

## 2022-01-01 RX ORDER — IBUPROFEN 200 MG
200 TABLET ORAL AS NEEDED
COMMUNITY

## 2022-01-01 RX ORDER — MAGNESIUM SULFATE HEPTAHYDRATE 40 MG/ML
2 INJECTION, SOLUTION INTRAVENOUS ONCE
Status: COMPLETED | OUTPATIENT
Start: 2022-01-01 | End: 2022-01-01

## 2022-01-01 RX ORDER — HYDROCODONE BITARTRATE AND ACETAMINOPHEN 5; 325 MG/1; MG/1
1 TABLET ORAL EVERY 4 HOURS PRN
Qty: 8 TABLET | Refills: 0 | Status: SHIPPED | OUTPATIENT
Start: 2022-01-01 | End: 2022-01-01

## 2022-01-01 RX ORDER — FENTANYL CITRATE 50 UG/ML
25 INJECTION, SOLUTION INTRAMUSCULAR; INTRAVENOUS
Status: DISCONTINUED | OUTPATIENT
Start: 2022-01-01 | End: 2022-01-01 | Stop reason: HOSPADM

## 2022-01-01 RX ORDER — PREDNISONE 50 MG/1
TABLET ORAL
COMMUNITY
Start: 2022-01-01 | End: 2022-01-01

## 2022-01-01 RX ORDER — FLUMAZENIL 0.1 MG/ML
0.2 INJECTION INTRAVENOUS AS NEEDED
Status: DISCONTINUED | OUTPATIENT
Start: 2022-01-01 | End: 2022-01-01 | Stop reason: HOSPADM

## 2022-01-01 RX ORDER — LIDOCAINE HYDROCHLORIDE 20 MG/ML
INJECTION, SOLUTION EPIDURAL; INFILTRATION; INTRACAUDAL; PERINEURAL AS NEEDED
Status: DISCONTINUED | OUTPATIENT
Start: 2022-01-01 | End: 2022-01-01 | Stop reason: SURG

## 2022-01-01 RX ORDER — OXYCODONE AND ACETAMINOPHEN 7.5; 325 MG/1; MG/1
2 TABLET ORAL EVERY 4 HOURS PRN
Status: DISCONTINUED | OUTPATIENT
Start: 2022-01-01 | End: 2022-01-01 | Stop reason: HOSPADM

## 2022-01-01 RX ORDER — HYDROCODONE BITARTRATE AND ACETAMINOPHEN 5; 325 MG/1; MG/1
1 TABLET ORAL ONCE AS NEEDED
Status: DISCONTINUED | OUTPATIENT
Start: 2022-01-01 | End: 2022-01-01 | Stop reason: HOSPADM

## 2022-01-01 RX ORDER — DIPHENHYDRAMINE HCL 25 MG
25 CAPSULE ORAL NIGHTLY PRN
COMMUNITY

## 2022-01-01 RX ORDER — GABAPENTIN 300 MG/1
300 CAPSULE ORAL 2 TIMES DAILY
Status: DISCONTINUED | OUTPATIENT
Start: 2022-01-01 | End: 2022-01-01 | Stop reason: HOSPADM

## 2022-01-01 RX ORDER — DILTIAZEM HYDROCHLORIDE 5 MG/ML
10 INJECTION INTRAVENOUS ONCE
Status: COMPLETED | OUTPATIENT
Start: 2022-01-01 | End: 2022-01-01

## 2022-01-01 RX ADMIN — AMIODARONE HYDROCHLORIDE 150 MG: 1.5 INJECTION, SOLUTION INTRAVENOUS at 09:16

## 2022-01-01 RX ADMIN — GABAPENTIN 300 MG: 300 CAPSULE ORAL at 20:06

## 2022-01-01 RX ADMIN — BARIUM SULFATE 240 ML: 960 POWDER, FOR SUSPENSION ORAL at 12:01

## 2022-01-01 RX ADMIN — SODIUM CHLORIDE, POTASSIUM CHLORIDE, SODIUM LACTATE AND CALCIUM CHLORIDE 1000 ML: 600; 310; 30; 20 INJECTION, SOLUTION INTRAVENOUS at 08:36

## 2022-01-01 RX ADMIN — TRAMADOL HYDROCHLORIDE 50 MG: 50 TABLET ORAL at 05:35

## 2022-01-01 RX ADMIN — BARIUM SULFATE 60 ML: 400 SUSPENSION ORAL at 10:09

## 2022-01-01 RX ADMIN — IOPAMIDOL 100 ML: 755 INJECTION, SOLUTION INTRAVENOUS at 10:28

## 2022-01-01 RX ADMIN — DEXAMETHASONE SODIUM PHOSPHATE 4 MG: 4 INJECTION, SOLUTION INTRA-ARTICULAR; INTRALESIONAL; INTRAMUSCULAR; INTRAVENOUS; SOFT TISSUE at 10:41

## 2022-01-01 RX ADMIN — TRAMADOL HYDROCHLORIDE 50 MG: 50 TABLET ORAL at 20:06

## 2022-01-01 RX ADMIN — FENTANYL CITRATE 25 MCG: 50 INJECTION, SOLUTION INTRAMUSCULAR; INTRAVENOUS at 08:44

## 2022-01-01 RX ADMIN — METOPROLOL TARTRATE 25 MG: 25 TABLET, FILM COATED ORAL at 20:07

## 2022-01-01 RX ADMIN — Medication 10 ML: at 20:08

## 2022-01-01 RX ADMIN — OXYCODONE HYDROCHLORIDE AND ACETAMINOPHEN 1 TABLET: 7.5; 325 TABLET ORAL at 17:13

## 2022-01-01 RX ADMIN — BARIUM SULFATE 25 ML: 400 PASTE ORAL at 10:09

## 2022-01-01 RX ADMIN — PROPOFOL 130 MG: 10 INJECTION, EMULSION INTRAVENOUS at 10:29

## 2022-01-01 RX ADMIN — SODIUM CHLORIDE, POTASSIUM CHLORIDE, SODIUM LACTATE AND CALCIUM CHLORIDE 75 ML/HR: 600; 310; 30; 20 INJECTION, SOLUTION INTRAVENOUS at 17:15

## 2022-01-01 RX ADMIN — ENOXAPARIN SODIUM 60 MG: 100 INJECTION SUBCUTANEOUS at 11:34

## 2022-01-01 RX ADMIN — LORAZEPAM 0.5 MG: 0.5 TABLET ORAL at 00:26

## 2022-01-01 RX ADMIN — ONDANSETRON 4 MG: 2 INJECTION INTRAMUSCULAR; INTRAVENOUS at 08:42

## 2022-01-01 RX ADMIN — FAMOTIDINE 20 MG: 10 INJECTION INTRAVENOUS at 20:07

## 2022-01-01 RX ADMIN — HYDROMORPHONE HYDROCHLORIDE 1 MG: 1 INJECTION, SOLUTION INTRAMUSCULAR; INTRAVENOUS; SUBCUTANEOUS at 09:38

## 2022-01-01 RX ADMIN — METAXALONE 800 MG: 800 TABLET ORAL at 00:26

## 2022-01-01 RX ADMIN — ENOXAPARIN SODIUM 60 MG: 100 INJECTION SUBCUTANEOUS at 05:19

## 2022-01-01 RX ADMIN — ONDANSETRON 4 MG: 2 INJECTION INTRAMUSCULAR; INTRAVENOUS at 10:41

## 2022-01-01 RX ADMIN — SODIUM CHLORIDE, POTASSIUM CHLORIDE, SODIUM LACTATE AND CALCIUM CHLORIDE 1000 ML: 600; 310; 30; 20 INJECTION, SOLUTION INTRAVENOUS at 06:46

## 2022-01-01 RX ADMIN — SUGAMMADEX 200 MG: 100 INJECTION, SOLUTION INTRAVENOUS at 10:47

## 2022-01-01 RX ADMIN — DILTIAZEM HYDROCHLORIDE 10 MG: 5 INJECTION INTRAVENOUS at 08:45

## 2022-01-01 RX ADMIN — OXYCODONE HYDROCHLORIDE AND ACETAMINOPHEN 1 TABLET: 7.5; 325 TABLET ORAL at 09:09

## 2022-01-01 RX ADMIN — HYDROMORPHONE HYDROCHLORIDE 0.5 MG: 1 INJECTION, SOLUTION INTRAMUSCULAR; INTRAVENOUS; SUBCUTANEOUS at 14:44

## 2022-01-01 RX ADMIN — SODIUM CHLORIDE 1000 ML: 9 INJECTION, SOLUTION INTRAVENOUS at 09:02

## 2022-01-01 RX ADMIN — BUDESONIDE AND FORMOTEROL FUMARATE DIHYDRATE 2 PUFF: 160; 4.5 AEROSOL RESPIRATORY (INHALATION) at 20:00

## 2022-01-01 RX ADMIN — BARIUM SULFATE 55 ML: 0.81 POWDER, FOR SUSPENSION ORAL at 10:09

## 2022-01-01 RX ADMIN — LIDOCAINE HYDROCHLORIDE 50 MG: 20 INJECTION, SOLUTION EPIDURAL; INFILTRATION; INTRACAUDAL; PERINEURAL at 10:29

## 2022-01-01 RX ADMIN — MAGNESIUM SULFATE HEPTAHYDRATE 2 G: 2 INJECTION, SOLUTION INTRAVENOUS at 09:05

## 2022-01-01 RX ADMIN — ROSUVASTATIN CALCIUM 20 MG: 20 TABLET, FILM COATED ORAL at 20:07

## 2022-01-01 RX ADMIN — ROCURONIUM BROMIDE 25 MG: 10 SOLUTION INTRAVENOUS at 10:29

## 2022-01-01 RX ADMIN — BUDESONIDE AND FORMOTEROL FUMARATE DIHYDRATE 2 PUFF: 160; 4.5 AEROSOL RESPIRATORY (INHALATION) at 07:22

## 2022-01-01 RX ADMIN — MONTELUKAST SODIUM 10 MG: 10 TABLET, FILM COATED ORAL at 20:07

## 2022-01-03 ENCOUNTER — APPOINTMENT (OUTPATIENT)
Dept: INFUSION THERAPY | Age: 77
End: 2022-01-03
Payer: OTHER GOVERNMENT

## 2022-01-03 DIAGNOSIS — I25.10 CORONARY ARTERY DISEASE INVOLVING NATIVE CORONARY ARTERY OF NATIVE HEART WITHOUT ANGINA PECTORIS: ICD-10-CM

## 2022-01-06 DIAGNOSIS — K59.00 CONSTIPATION IN MALE: Primary | ICD-10-CM

## 2022-01-06 RX ORDER — SORBITOL SOLUTION 70 %
30 SOLUTION, ORAL MISCELLANEOUS DAILY
Qty: 900 ML | Refills: 0 | Status: SHIPPED | OUTPATIENT
Start: 2022-01-06 | End: 2022-02-05

## 2022-01-06 NOTE — THERAPY DISCHARGE NOTE
Outpatient Speech Language Pathology   Adult Swallow Initial Eval/Discharge  [facility]     Patient Name: Chucky Jamison  : 1945  MRN: [id number]  Today's Date: 2022    SPEECH-LANGUAGE PATHOLOGY EVALUTION - VFSS  Subjective: The patient was seen on this date for a VFSS(Videofluoroscopic Swallowing Study).  Patient was alert and cooperative.    Significant history: Right thyroidectomy and isthmusectomy with excision of right neck mass. Dysphagia, chemo therapy, lung cancer.  Objective: Risks/benefits were reviewed with the patient and family, and consent was obtained. The study was completed with SLP and Radiologist present. The patient was seen in lateral view(s). Textures given included thin liquid, nectar thick liquid, honey thick liquid, puree consistency, mechanical soft consistency and regular consistency.  Assessment: SLP notes the pt's swallow was noted to be timely with honey thick and nectar thick trials. Premature loss was noted to the vallecula with pudding thick barium as well as approximating the pyriform sinuses with mechanical soft soft and regular oneil and 1x with thin liquid trial. SLP notes premature spillage was secondary to decreased back and base of tongue strength as well as swallow delay. Pt is noted to have modarate decrease in anterior hyolaryngeal excursion as well as superior laryngeal elevation with honey thick, nectar thick, pudding thick, mechanical soft, and regular solids. Posterior pharyngeal wall stripping is also decreased by at least a mild amount. Movement was noted to decreased to a moderate-severe amount with thin liquids completed via straw. Residue was observed to be moderate along the base of tongue, within the vallecula, posterior pharyngeal wall and mild-mod within the lateral channels following honey thick and nectar thick liquids. Reduced increase to a moderate-severe amount throughout the pharyngeal area with thin liquids. Residue was note to be of a mild-mod  amount within the vallecula, posterior pharyngeal wall, and lateral channels following pudding thick, mechanical soft, and regular solids. Residue was reduced with a cued subsequent swallow. SLP notes pt to have a significant retroverted epiglottic which does easily build residue. A chin tuck was complete with thin liquid in which was initiation improved and was timely. Epiglottic inversion also increased and with a subsequent swallow a mild-moderate amount of thin liquid barium was present post swallow. SLP notes no definite instances of penetration or aspiration.    SLP Findings: Patient presents with mild oropharyngeal dysphagia.     Recommendations: Diet Textures: thin liquid, regular consistency food. Medications should be taken whole with puree.   Recommended Strategies: Upright for PO, small bites and sips and double swallow with food and liquids. Oral care before breakfast, after all meals and PRN. Chin tuck maneuver to be utilized.  Other Recommended Evaluations: VFSS    Dysphagia therapy is recommended. Rationale: Rehabilitate deconditioned swallow.  Armond Espinal MS CCC-SLP 1/6/2022 15:15 CST           Visit Date: 01/06/2022   Patient Active Problem List   Diagnosis   • Thoracic spine fracture (HCC)   • Malignant neoplasm of left lung (HCC)   • Mass of right side of neck   • Mass of soft tissue of neck   • Thyroid mass of unclear etiology   • Thyroid carcinoma (HCC)   • Unilateral complete paralysis of vocal cord   • Pharyngoesophageal dysphagia        Past Medical History:   Diagnosis Date   • Cataract    • COPD (chronic obstructive pulmonary disease) (HCC)    • Elevated cholesterol    • Emphysema lung (HCC)    • GERD (gastroesophageal reflux disease)    • Heart disease    • Hypercholesteremia    • Hypertension    • Lung cancer (HCC)    • Mass of right side of neck    • Mass of soft tissue of neck    • Prostate cancer (HCC)         Past Surgical History:   Procedure Laterality Date   • CATARACT  EXTRACTION WITH INTRAOCULAR LENS IMPLANT Bilateral    • CORONARY STENT PLACEMENT     • EXCISION MASS HEAD/NECK Right 11/17/2021    Procedure: Excisional biopsy of right posterior neck mass and excisional biopsy of right anterior neck mass (related to the inferior right thyroid lobe);  Surgeon: Blair Walsh MD;  Location: [facility] OR;  Service: ENT;  Laterality: Right;   • HERNIA REPAIR     • LUNG BIOPSY     • PROSTATE SURGERY           Visit Dx:     ICD-10-CM ICD-9-CM   1. Dysphagia, unspecified type  R13.10 787.20                SLP Adult Swallow Evaluation     Row Name 01/06/22 1059       Rehab Evaluation    Document Type evaluation  -CS    Subjective Information no complaints  -CS    Patient Observations alert; cooperative; agree to therapy  -CS    Patient/Family/Caregiver Comments/Observations Spouse present  -CS    Patient Effort good  -CS       General Information    Patient Profile Reviewed yes  -CS    Pertinent History Of Current Problem Right thyroidectomy and isthmusectomy with excision of right neck mass, dysphagia  -CS    Current Method of Nutrition regular textures; thin liquids  -CS    Precautions/Limitations, Vision WFL; for purposes of eval  -CS    Precautions/Limitations, Hearing WFL; for purposes of eval  -CS    Prior Level of Function-Communication WFL  -CS    Prior Level of Function-Swallowing no diet consistency restrictions  -CS    Plans/Goals Discussed with patient; spouse/S.O.  -CS    Barriers to Rehab medically complex  -CS    Patient's Goals for Discharge eat/drink without coughing/choking  -CS    Family Goals for Discharge patient able to eat/drink without coughing/choking  -CS       Pain    Additional Documentation Pain Scale: FACES Pre/Post-Treatment (Group)  -CS       Pain Scale: FACES Pre/Post-Treatment    Pain: FACES Scale, Pretreatment 0-->no hurt  -CS    Posttreatment Pain Rating 0-->no hurt  -CS       Oral Motor Structure and Function    Dentition Assessment natural, present  and adequate  -CS    Secretion Management WNL/WFL  -CS    Mucosal Quality moist, healthy  -CS    Volitional Swallow delayed; weak  -CS    Volitional Cough weak  -CS       Oral Musculature and Cranial Nerve Assessment    Oral Motor General Assessment WFL  -CS       General Eating/Swallowing Observations    Respiratory Support Currently in Use room air  -CS       MBS/VFSS    Utensils Used spoon; straw  -CS    Consistencies Trialed regular textures; pudding thick; honey-thick liquids; nectar/syrup-thick liquids; thin liquids; soft textures  -CS       MBS/VFSS Interpretation    Oral Prep Phase WFL  -CS    Oral Transit Phase WFL  -CS    Oral Residue impaired  -CS    VFSS Summary See note  -CS       Oral Residue    Impaired Oral Residue lingual residue  -CS    Lingual Residue thin liquids; mechanical soft; regular textures  -CS       Initiation of Pharyngeal Swallow    Initiation of Pharyngeal Swallow bolus in pyriform sinuses  -CS    Pharyngeal Phase impaired pharyngeal phase of swallowing  -CS    Pharyngeal Residue diffuse within pharynx; thin liquids; nectar-thick liquids; honey-thick liquids; pudding/puree; mechanical soft; regular textures; posterior pharyngeal wall  -CS    Response to Residue cleared residue with cued swallow  -CS    Attempted Compensatory Maneuvers chin tuck  -CS    Response to Attempted Compensatory Maneuvers reduced residue  -CS       SLP Evaluation Clinical Impression    SLP Swallowing Diagnosis oral dysphagia; pharyngeal dysphagia  -CS    Functional Impact risk of aspiration/pneumonia  -CS    Rehab Potential/Prognosis, Swallowing good, to achieve stated therapy goals  -CS    Swallow Criteria for Skilled Therapeutic Interventions Met demonstrates skilled criteria  -CS       SLP Treatment Clinical Impressions    Care Plan Review evaluation/treatment results reviewed  -CS       Recommendations    Therapy Frequency (Swallow) evaluation only  -CS    SLP Diet Recommendation regular textures; thin  liquids  -CS    Recommended Precautions and Strategies upright posture during/after eating; small bites of food and sips of liquid; chin tuck; multiple swallows per bite of food; multiple swallows per sip of liquid  -CS    Oral Care Recommendations Oral Care BID/PRN  -CS    SLP Rec. for Method of Medication Administration meds whole; with pudding or applesauce; as tolerated  -CS    Monitor for Signs of Aspiration yes; notify SLP if any concerns  -CS    Anticipated Discharge Disposition (SLP) home  -CS          User Key  (r) = Recorded By, (t) = Taken By, (c) = Cosigned By    Initials Name Provider Type    CS Armond Espinal MS CCC-SLP Speech and Language Pathologist                               OP SLP Education     Row Name 01/06/22 1502       Education    Barriers to Learning No barriers identified  -CS    Education Provided Described results of evaluation  -CS    Assessed Learning needs; Learning motivation; Learning preferences; Learning readiness  -CS    Learning Motivation Strong  -CS    Learning Method Explanation  -CS    Teaching Response Verbalized understanding  -CS          User Key  (r) = Recorded By, (t) = Taken By, (c) = Cosigned By    Initials Name Effective Dates    CS Armond Espinal MS CCC-SLP 06/16/21 -                            Time Calculation:   SLP Start Time: 1059  SLP Stop Time: 1245  SLP Time Calculation (min): 106 min  Untimed Charges  SLP Eval/Re-eval : ST [facility] Fluoro Eval Swallow - 61093  [id number] [facility] Fluoro Eval Swallow Minutes: 106  Total Minutes  Untimed Charges Total Minutes: 106   Total Minutes: 106    Therapy Charges for Today     Code Description Service Date Service Provider Modifiers Qty    91442269093 HC ST MOTION FLUORO EVAL SWALLOW 7 1/6/2022 Armond Espinal MS CCC-SLP GN 1                      Armond Espinal MS CCC-SLP  1/6/2022

## 2022-01-10 ENCOUNTER — OFFICE VISIT (OUTPATIENT)
Dept: HEMATOLOGY | Age: 77
End: 2022-01-10
Payer: OTHER GOVERNMENT

## 2022-01-10 ENCOUNTER — HOSPITAL ENCOUNTER (OUTPATIENT)
Dept: INFUSION THERAPY | Age: 77
Discharge: HOME | End: 2022-01-10
Payer: OTHER GOVERNMENT

## 2022-01-10 VITALS
HEART RATE: 52 BPM | RESPIRATION RATE: 16 BRPM | HEIGHT: 67 IN | BODY MASS INDEX: 21.97 KG/M2 | WEIGHT: 140 LBS | SYSTOLIC BLOOD PRESSURE: 112 MMHG | OXYGEN SATURATION: 97 % | TEMPERATURE: 97.6 F | DIASTOLIC BLOOD PRESSURE: 64 MMHG

## 2022-01-10 DIAGNOSIS — R91.8 LUNG MASS: ICD-10-CM

## 2022-01-10 DIAGNOSIS — Z71.89 CARE PLAN DISCUSSED WITH PATIENT: ICD-10-CM

## 2022-01-10 DIAGNOSIS — C34.92 SQUAMOUS CELL CARCINOMA OF LEFT LUNG (HCC): ICD-10-CM

## 2022-01-10 DIAGNOSIS — E04.1 THYROID NODULE: ICD-10-CM

## 2022-01-10 DIAGNOSIS — C34.92 STAGE IV SQUAMOUS CELL CARCINOMA OF LEFT LUNG (HCC): ICD-10-CM

## 2022-01-10 DIAGNOSIS — Z51.12 ENCOUNTER FOR ANTINEOPLASTIC IMMUNOTHERAPY: ICD-10-CM

## 2022-01-10 DIAGNOSIS — E04.1 THYROID NODULE: Primary | ICD-10-CM

## 2022-01-10 DIAGNOSIS — Z51.11 CHEMOTHERAPY MANAGEMENT, ENCOUNTER FOR: ICD-10-CM

## 2022-01-10 DIAGNOSIS — R91.8 LUNG MASS: Primary | ICD-10-CM

## 2022-01-10 DIAGNOSIS — T45.1X5D ADVERSE EFFECT OF CHEMOTHERAPY, SUBSEQUENT ENCOUNTER: ICD-10-CM

## 2022-01-10 LAB
ALBUMIN SERPL-MCNC: 4.1 G/DL (ref 3.5–5.2)
ALP BLD-CCNC: 78 U/L (ref 40–130)
ALT SERPL-CCNC: 36 U/L (ref 21–72)
ANION GAP SERPL CALCULATED.3IONS-SCNC: 11 MMOL/L (ref 7–19)
AST SERPL-CCNC: 44 U/L (ref 17–59)
BASOPHILS ABSOLUTE: 0.01 K/UL (ref 0.01–0.08)
BASOPHILS RELATIVE PERCENT: 0.1 % (ref 0.1–1.2)
BILIRUB SERPL-MCNC: 1 MG/DL (ref 0.2–1.3)
BUN BLDV-MCNC: 15 MG/DL (ref 9–20)
CALCIUM SERPL-MCNC: 9.4 MG/DL (ref 8.4–10.2)
CHLORIDE BLD-SCNC: 103 MMOL/L (ref 98–111)
CO2: 23 MMOL/L (ref 22–29)
CREAT SERPL-MCNC: 0.6 MG/DL (ref 0.6–1.2)
EOSINOPHILS ABSOLUTE: 0 K/UL (ref 0.04–0.54)
EOSINOPHILS RELATIVE PERCENT: 0 % (ref 0.7–7)
GFR NON-AFRICAN AMERICAN: >60
GLOBULIN: 3.6 G/DL
GLUCOSE BLD-MCNC: 216 MG/DL (ref 74–106)
HCT VFR BLD CALC: 37.8 % (ref 40.1–51)
HEMOGLOBIN: 13.2 G/DL (ref 13.7–17.5)
LYMPHOCYTES ABSOLUTE: 0.88 K/UL (ref 1.18–3.74)
LYMPHOCYTES RELATIVE PERCENT: 8.1 % (ref 19.3–53.1)
MCH RBC QN AUTO: 32 PG (ref 25.7–32.2)
MCHC RBC AUTO-ENTMCNC: 34.9 G/DL (ref 32.3–36.5)
MCV RBC AUTO: 91.7 FL (ref 79–92.2)
MONOCYTES ABSOLUTE: 0.2 K/UL (ref 0.24–0.82)
MONOCYTES RELATIVE PERCENT: 1.8 % (ref 4.7–12.5)
NEUTROPHILS ABSOLUTE: 9.75 K/UL (ref 1.56–6.13)
NEUTROPHILS RELATIVE PERCENT: 90 % (ref 34–71.1)
PDW BLD-RTO: 14 % (ref 11.6–14.4)
PLATELET # BLD: 168 K/UL (ref 163–337)
PMV BLD AUTO: 9.5 FL (ref 7.4–10.4)
POTASSIUM SERPL-SCNC: 4.4 MMOL/L (ref 3.5–5.1)
RBC # BLD: 4.12 M/UL (ref 4.63–6.08)
SODIUM BLD-SCNC: 137 MMOL/L (ref 137–145)
TOTAL PROTEIN: 7.7 G/DL (ref 6.3–8.2)
TSH SERPL DL<=0.05 MIU/L-ACNC: 1.02 UIU/ML (ref 0.47–4.68)
WBC # BLD: 10.84 K/UL (ref 4.23–9.07)

## 2022-01-10 PROCEDURE — 99214 OFFICE O/P EST MOD 30 MIN: CPT | Performed by: INTERNAL MEDICINE

## 2022-01-10 PROCEDURE — 84443 ASSAY THYROID STIM HORMONE: CPT

## 2022-01-10 PROCEDURE — 2500000003 HC RX 250 WO HCPCS: Performed by: INTERNAL MEDICINE

## 2022-01-10 PROCEDURE — 85025 COMPLETE CBC W/AUTO DIFF WBC: CPT

## 2022-01-10 PROCEDURE — 6360000002 HC RX W HCPCS: Performed by: INTERNAL MEDICINE

## 2022-01-10 PROCEDURE — 96417 CHEMO IV INFUS EACH ADDL SEQ: CPT

## 2022-01-10 PROCEDURE — 2580000003 HC RX 258: Performed by: INTERNAL MEDICINE

## 2022-01-10 PROCEDURE — 96413 CHEMO IV INFUSION 1 HR: CPT

## 2022-01-10 PROCEDURE — 96415 CHEMO IV INFUSION ADDL HR: CPT

## 2022-01-10 PROCEDURE — 96375 TX/PRO/DX INJ NEW DRUG ADDON: CPT

## 2022-01-10 PROCEDURE — 80053 COMPREHEN METABOLIC PANEL: CPT

## 2022-01-10 RX ORDER — DEXAMETHASONE SODIUM PHOSPHATE 10 MG/ML
10 INJECTION, SOLUTION INTRAMUSCULAR; INTRAVENOUS ONCE
Status: COMPLETED | OUTPATIENT
Start: 2022-01-10 | End: 2022-01-10

## 2022-01-10 RX ORDER — SODIUM CHLORIDE 0.9 % (FLUSH) 0.9 %
5-40 SYRINGE (ML) INJECTION PRN
Status: DISCONTINUED | OUTPATIENT
Start: 2022-01-10 | End: 2022-01-11 | Stop reason: HOSPADM

## 2022-01-10 RX ORDER — SODIUM CHLORIDE 0.9 % (FLUSH) 0.9 %
5-40 SYRINGE (ML) INJECTION PRN
Status: CANCELLED | OUTPATIENT
Start: 2022-01-10

## 2022-01-10 RX ORDER — MEPERIDINE HYDROCHLORIDE 50 MG/ML
12.5 INJECTION INTRAMUSCULAR; INTRAVENOUS; SUBCUTANEOUS PRN
Status: CANCELLED | OUTPATIENT
Start: 2022-01-10

## 2022-01-10 RX ORDER — DIPHENHYDRAMINE HYDROCHLORIDE 50 MG/ML
25 INJECTION INTRAMUSCULAR; INTRAVENOUS ONCE
Status: CANCELLED | OUTPATIENT
Start: 2022-01-10

## 2022-01-10 RX ORDER — ONDANSETRON 2 MG/ML
8 INJECTION INTRAMUSCULAR; INTRAVENOUS
Status: CANCELLED | OUTPATIENT
Start: 2022-01-10

## 2022-01-10 RX ORDER — ALBUTEROL SULFATE 90 UG/1
4 AEROSOL, METERED RESPIRATORY (INHALATION) PRN
Status: CANCELLED | OUTPATIENT
Start: 2022-01-10

## 2022-01-10 RX ORDER — SODIUM CHLORIDE 9 MG/ML
5-40 INJECTION INTRAVENOUS PRN
Status: CANCELLED | OUTPATIENT
Start: 2022-01-10

## 2022-01-10 RX ORDER — SODIUM CHLORIDE 9 MG/ML
20 INJECTION, SOLUTION INTRAVENOUS ONCE
Status: COMPLETED | OUTPATIENT
Start: 2022-01-10 | End: 2022-01-11

## 2022-01-10 RX ORDER — HEPARIN SODIUM (PORCINE) LOCK FLUSH IV SOLN 100 UNIT/ML 100 UNIT/ML
500 SOLUTION INTRAVENOUS PRN
Status: CANCELLED | OUTPATIENT
Start: 2022-01-10

## 2022-01-10 RX ORDER — PALONOSETRON 0.05 MG/ML
0.25 INJECTION, SOLUTION INTRAVENOUS ONCE
Status: CANCELLED | OUTPATIENT
Start: 2022-01-10

## 2022-01-10 RX ORDER — ACETAMINOPHEN 325 MG/1
650 TABLET ORAL
Status: CANCELLED | OUTPATIENT
Start: 2022-01-10

## 2022-01-10 RX ORDER — SODIUM CHLORIDE 9 MG/ML
20 INJECTION, SOLUTION INTRAVENOUS ONCE
Status: CANCELLED | OUTPATIENT
Start: 2022-01-10 | End: 2022-01-10

## 2022-01-10 RX ORDER — PALONOSETRON 0.05 MG/ML
0.25 INJECTION, SOLUTION INTRAVENOUS ONCE
Status: COMPLETED | OUTPATIENT
Start: 2022-01-10 | End: 2022-01-10

## 2022-01-10 RX ORDER — DIPHENHYDRAMINE HYDROCHLORIDE 50 MG/ML
50 INJECTION INTRAMUSCULAR; INTRAVENOUS
Status: CANCELLED | OUTPATIENT
Start: 2022-01-10

## 2022-01-10 RX ORDER — SODIUM CHLORIDE 9 MG/ML
INJECTION, SOLUTION INTRAVENOUS CONTINUOUS
Status: CANCELLED | OUTPATIENT
Start: 2022-01-10

## 2022-01-10 RX ORDER — EPINEPHRINE 1 MG/ML
0.3 INJECTION, SOLUTION, CONCENTRATE INTRAVENOUS PRN
Status: CANCELLED | OUTPATIENT
Start: 2022-01-10

## 2022-01-10 RX ORDER — DIPHENHYDRAMINE HYDROCHLORIDE 50 MG/ML
25 INJECTION INTRAMUSCULAR; INTRAVENOUS ONCE
Status: COMPLETED | OUTPATIENT
Start: 2022-01-10 | End: 2022-01-10

## 2022-01-10 RX ORDER — HEPARIN SODIUM (PORCINE) LOCK FLUSH IV SOLN 100 UNIT/ML 100 UNIT/ML
500 SOLUTION INTRAVENOUS PRN
Status: DISCONTINUED | OUTPATIENT
Start: 2022-01-10 | End: 2022-01-11 | Stop reason: HOSPADM

## 2022-01-10 RX ORDER — SODIUM CHLORIDE 9 MG/ML
25 INJECTION, SOLUTION INTRAVENOUS PRN
Status: CANCELLED | OUTPATIENT
Start: 2022-01-10

## 2022-01-10 RX ADMIN — CARBOPLATIN 545 MG: 10 INJECTION INTRAVENOUS at 14:15

## 2022-01-10 RX ADMIN — SODIUM CHLORIDE, PRESERVATIVE FREE 10 ML: 5 INJECTION INTRAVENOUS at 14:46

## 2022-01-10 RX ADMIN — DEXAMETHASONE SODIUM PHOSPHATE 10 MG: 10 INJECTION, SOLUTION INTRAMUSCULAR; INTRAVENOUS at 10:15

## 2022-01-10 RX ADMIN — PALONOSETRON 0.25 MG: 0.05 INJECTION, SOLUTION INTRAVENOUS at 10:15

## 2022-01-10 RX ADMIN — SODIUM CHLORIDE 20 ML/HR: 9 INJECTION, SOLUTION INTRAVENOUS at 10:14

## 2022-01-10 RX ADMIN — SODIUM CHLORIDE 200 MG: 9 INJECTION, SOLUTION INTRAVENOUS at 10:37

## 2022-01-10 RX ADMIN — PACLITAXEL 300 MG: 6 INJECTION, SOLUTION, CONCENTRATE INTRAVENOUS at 11:09

## 2022-01-10 RX ADMIN — DIPHENHYDRAMINE HYDROCHLORIDE 25 MG: 50 INJECTION, SOLUTION INTRAMUSCULAR; INTRAVENOUS at 10:15

## 2022-01-10 RX ADMIN — FAMOTIDINE 20 MG: 10 INJECTION, SOLUTION INTRAVENOUS at 10:15

## 2022-01-15 PROBLEM — R05.9 COUGH: Status: RESOLVED | Noted: 2021-11-04 | Resolved: 2022-01-15

## 2022-01-24 NOTE — PROGRESS NOTES
Speech/Language Pathology Initial Evaluation and Plan of Care    Patient: Chucky Jamison               : 1945  Visit Date: 2022  Referring practitioner: Blair Walsh MD  Date of Initial Visit: 2022  Patient seen for 1 sessions    Visit Diagnoses:    ICD-10-CM ICD-9-CM   1. Oropharyngeal dysphagia  R13.12 787.22     Past Medical History:   Diagnosis Date   • Cataract    • COPD (chronic obstructive pulmonary disease) (HCC)    • Elevated cholesterol    • Emphysema lung (HCC)    • GERD (gastroesophageal reflux disease)    • Heart disease    • Hypercholesteremia    • Hypertension    • Lung cancer (HCC)    • Mass of right side of neck    • Mass of soft tissue of neck    • Prostate cancer (HCC)      Past Surgical History:   Procedure Laterality Date   • CATARACT EXTRACTION WITH INTRAOCULAR LENS IMPLANT Bilateral    • CORONARY STENT PLACEMENT     • EXCISION MASS HEAD/NECK Right 2021    Procedure: Excisional biopsy of right posterior neck mass and excisional biopsy of right anterior neck mass (related to the inferior right thyroid lobe);  Surgeon: Blair Walsh MD;  Location: [facility];  Service: ENT;  Laterality: Right;   • HERNIA REPAIR     • LUNG BIOPSY     • PROSTATE SURGERY         SUBJECTIVE     Subjective   Patient presents with the following symptoms: coughing, difficulty swallowing solids, difficulty swallowing liquids, difficulty swallowing pills, food getting stuck and globus   Date of Onset: 11/15/21  History of present problems: difficulty swallowing and aphonic following surgery  The functional impact on the patient: risk of inadequate nutrition and hydration, risk of aspiration  Prior level of function: WFL    Pertinent Medical History Related to this Problem: head/neck cancer Mass on right side of neck s/p thyroidectomy and isthmusectomy   Current Diet Level:   Solid: 6 - Soft & Bite-Sized  Liquid: 0 - Thin  Non-oral  Feeding: N/A    OBJECTIVE     ORAL [facility]  Respiratory/Swallow Coordination: WFL  Position During Evaluation: Upright  Anatomy/Physiology: Patient presents with lingual weakness on elevation and bilateral excursion  Dentition: Patient is endentulous, Dentures were worn for this evaluation and Dentures are typically worn for meals  Oral Health: Oral cavity is clean  Awareness/Control of Secretions: Patient swallows saliva    Method of Food Administration: Cup    CLINICAL OBSERVATIONS  Oral Phase: reduced tongue elevation  Pharyngeal Symptoms: multiple swallows with consistencies of thin water  Summary of Clinical Exam: Patient presents with moderate oralpharyngeal dysphagia     INSTRUMENTAL ASSESSMENT  Instrumental Exam Completed?: Yes :  Date: 1/6/2022  Hospital/Medical Center: [facility]  Results: reduced epglottic inversion resulting in moderate to severe pharyngeal residue without penetration or aspiration   Recommendations: Regular diet with thin liquids, therapy to address deficits.   Please see SLP report of VFSS  Severity Level of Dysphagia: mild dysphagia-moderate dysphagia  Consistencies Aspirated/Penetrated: none  Summary Statement: Significant residue without penetration or aspiration secondary to reduced back and base of tongue strength.     IMPRESSION/RECOMMENDATIONS  Factors Affecting Performance: no difficulty participating in study  Learning Motivation: strong  Education/Learning Comments: Patient demonstrated understanding of evaluation results and plan of care.     PATIENT SELF ASSESSMENT    EAT 10  0= No problem 4= Severe problem  1. My swallowing has caused me to lose weight 3   2. My Swallowing problem interferes with my ability to go out for meals  4   3. Swallowing liquids takes extra effort 2   4. Swallowing solids takes extra effort 3   5. Swallowing pills takes extra effort 4   6. Swallowing is painful 1   7. The pleasure of eating is affected by my swallowing 3   8. When I  swallow, food sticks in my throat 4   9. I cough when I eat 2   10. Swallowing is stressful 2                                                                                                  Total Score 28     0-9 Minimal  10-19 Mild  20-29 Moderate 30-40 Severe      Clinical Impression (Swallowing):   Mild/Moderate: oropharyngeal phase dysphagia  Impact on Function: risk for inadequate nutrition, inadequate hydration, aspiration and pneumonia  Prognosis Comment: Good for stated goals.       Therapy Education/Self Care    Details: Evaluation and POC   Given Home Exercise Program  home strategies   Progress: New   Education provided to:  Patient and Spouse/SO   Level of understanding Verbalized           Total Time of Visit:            45  mins    ASSESSMENT/PLAN     Goals                                            STG  Comments Status   Patient will improve oral skills to enhance safety and increase eating efficiency and bolus control as measured by improved posterior propulsion of the bolus.     Patient will improve epiglottic inversion, increase base of the tongue strength and posterior pharyngeal wall excursion and increase efficiency of cough to clear residue from the airway as measured by decreased overt signs and symptoms of aspiration and decreased penetration and aspiration on repeat instrumental swallow study, if applicable.      Patient will report decreased difficulty with swallowing and improved EAT-10 score.          LTG      Patient will safely consume full PO diet of regular consistency food and thin liquids without difficulty or complications such as aspiration or pneumonia.        ASSESSMENT:   Patient is indicated for skilled care by a Speech/Language Pathologist.     Summary of Assessment: Mild/Moderate oral pharyngeal dysphagia    Recommendations for Diet/Nutrition: full oral diet  Swallowing Precautions: small sips and bites when eating  Therapy Recommendations: dysphagia therapy to address  swallowing deficits  Screening indicates the further need for voice therapy and ENT.    PLAN:  Details of Plan of Care: Initiate therapy and home exercises.     Frequency: 1 time per week  Duration:12 visits  Estimated Length of Session: 45 minutes    SPEECH/LANGUAGE PATHOLOGIST SIGNATURE: Bonita Cohen, CCC-SLP, License #: [id number]  Electronically Signed on 1/24/2022        Initial Certification  Certification Period: 1/24/2022 through 4/23/2022  I certify that the therapy services are furnished while this patient is under my care.  The services outlined above are required by this patient, and will be reviewed every 90 days.     PHYSICIAN:     Blair Walsh MD______________________________________DATE: _________     Please sign and return via fax to [phone].   Thank you so much for letting us work with Chucky. I appreciate your letting us work with your patients. If you have any questions or concerns, please don't hesitate to contact me.          [address] [phone]  [phone]

## 2022-01-29 NOTE — PROGRESS NOTES
MEDICAL ONCOLOGY PROGRESS NOTE    Pt Name: Priscilla Alvarenga  MRN: [id number]  YOB: 1945  Date of evaluation: 1/31/2022      HISTORY OF PRESENT ILLNESS:    Reason for MD visit-toxicity assessment/disease management    The patient is a pleasant 78years old male who presents today for consideration of cycle #3 of chemotherapy with carboplatin, Taxol and pembrolizumab. He has been tolerating treatments complains of fatigue, hair loss, joint and muscle pains. .  He has persistent hoarseness. He is being seen by speech therapy. He also complains of persistent dysphagia for solid foods. He has constipation related to treatment as well. Diagnosis  · Squamous cell carcinoma, Left lung, Nov 2021  · [id number], stage IV (left thyroid nodule, skin metastasis)  · 11/5/21-PD-L1 Expressed 35%    Treatment Summary  · 12/20/21- Carboplatin AUC 5 Paclitaxel 175mg/m2 and Keytruda      Cancer History  Sosa Esteban was first seen by me on 11/15/2021. He was referred by pulmonary at [facility] for a diagnosis of lung cancer. He developed complaints of cough. Chest x-ray was performed and showed a lung mass. Patient is a currently smoker. He lost about 25 pounds. · 9/28/21 CXR [facility]): Questionable mass density seen only on the lateral view with patchy density in left upper lobe. Further evaluation with computed tomography is recommended. COPD. · 10/4/21 CT chest [facility]): Mass lesion left hilum extending caudally along the mediastinum and inseparable from the mediastinum in this region. This is highly suspicious for malignancy. Consolidation and/or neoplastic mass left upper lobe as described above Emphysema. New right thyroid nodule, probably solid. 2 chronic cysts within the liver. · 10/22/21- PET scan [facility]): Essentially, mass in the right side of the neck of the neck base measuring 2.8 cm x 2.4 cm. SUV 18.9. Also small focus soft tissue of the back at the base of the neck on the right. Lesion measured 9 mm. SUV 10.5. Large mass in the left hilum measures 5.1 x 4.2 cm (SUV 18.6). Extensive parenchymal abnormality with a high SUV uptake in the left upper lobe. Apical component with a small amount of cavitation measuring 2.9 x 2.6 cm with SUV 11.3.  · 11/5/21 Lung, left mainstem bronchus biopsy with touch imprint smears: Invasive keratinizing squamous cell carcinoma. Lung, fine-needle aspiration of left hilar mass, ThinPrep and cell block: Keratinizing squamous cell carcinoma. Lung, left mainstem bronchial washing, ThinPrep and cell block: Keratinizing squamous cell carcinoma. · 11/15/2021-he was first seen by me. Recommended biopsy of right neck mass and also subcutaneous nodule right upper back. Discussed with ENT, Dr. Pepe Parker. · 11/17/2021 Final Diagnosis: Excisional biopsy of a metastatic nodul in the thyroid gland as well as metastatic skin nodule. Right posterior lower neck\": Metastatic keratinizing squamous cell carcinoma involving fibroadipose tissue. 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 · 11/24/2021 MRI Brain W Wo Contrast No acute intracranial abnormality, mass or acute infarction. Chronic ischemic and atrophic changes. · 12/6/2021-essentially, stage IV disease. Squamous cell carcinoma PDL 35% metastatic left upper back skin nodule consistent with squamous cell carcinoma. Left thyroid mass consistent with metastatic, cell carcinoma. Recommended frontline chemotherapy with carboplatin AUC 5, paclitaxel 175 mg/m², Keytruda every 3 weeks x4 cycles to be followed by maintenance Keytruda if stable disease. · 12/9/2021 2D Echo Normal left ventricular size and systolic function EF 55 to 60%. · 12/13/2021-initiation of palliative chemotherapy with carboplatin/Taxol/Keytruda.     Past Medical History:    Past Medical History:   Diagnosis Date    CAD (coronary artery disease)     RUFINO to LCX 1/13    Hyperlipidemia     Lung cancer ([facility] [address]     Nicotine dependence, cigarettes, w unsp disorders 12/1/2017    Prostate cancer ([facility] [address]     PVD (peripheral vascular disease) ([facility] [place] [address]     Tobacco abuse        Past Surgical History:    Past Surgical History:   Procedure Laterality Date    BRONCHOSCOPY Left 11/5/2021    BRONCHOSCOPY ENDOBRONCHIAL ULTRASOUND performed by Mckenzie Keenan MD at [address] [facility]   [address]  02/02/2013    EF 65%, patent stent to LCX, no new occlusive disease    CATARACT REMOVAL WITH IMPLANT Bilateral 2014    CORONARY ANGIOPLASTY  01/28/2013    RUFINO to LCX    HERNIA REPAIR      HX VASCULAR STENT  2013    LUNG BIOPSY  11/05/2021    PROSTATE SURGERY      SHOULDER SURGERY Left        Social History:    Marital status:   Smoking status:Yes; 60 years; 5-6 a day  ETOH status:No  Resides:[place]    Family History:   Family History   Problem Relation Age of Onset    Hypertension Mother     Stroke Mother     Cancer Brother 61        Lung    Cancer Brother 79        Prostate       Current Hospital Medications:    Current Outpatient Medications   Medication Sig Dispense Refill    LORazepam (ATIVAN) 0.5 MG tablet Take 1 tablet by mouth every 8 hours as needed for Anxiety for up to 30 days. 30 tablet 0    predniSONE (DELTASONE) 50 MG tablet Take 1 tablet by mouth 24 hrs, 12 hrs and 1 hr prior to CT scan 3 tablet 0    diphenhydrAMINE (BENADRYL) 50 MG tablet Take 1 tablet 1 hour prior to CT scan 1 tablet 0    sorbitol 70 % solution Take 30 mLs by mouth daily 900 mL 0    metoprolol tartrate (LOPRESSOR) 25 MG tablet TAKE 1 TABLET BY MOUTH TWICE A  tablet 0    gabapentin (NEURONTIN) 100 MG capsule Take 1 capsule by mouth 2 times daily for 30 days.  60 capsule 0    ondansetron (ZOFRAN) 4 MG tablet Take 1 tablet by mouth daily as needed for Nausea or Vomiting 30 tablet 0    dexamethasone (DECADRON) 4 MG tablet Take 5 tablets the night before and 5 tablets the morning of treatment every 3 weeks x 4 cycles 40 tablet 0    nitroGLYCERIN (NITROSTAT) 0.4 MG SL tablet Place 1 tablet under the tongue every 5 minutes as needed for Chest pain As directed 25 tablet 5    ezetimibe (ZETIA) 10 MG tablet Take 10 mg by mouth daily      montelukast (SINGULAIR) 10 MG tablet Take 10 mg by mouth nightly      rosuvastatin (CRESTOR) 10 MG tablet Take 20 mg by mouth daily       calcium citrate (CALCITRATE) 950 MG tablet Take 1 tablet by mouth daily      metaxalone (SKELAXIN) 800 MG tablet Take 800 mg by mouth      traMADol (ULTRAM) 50 MG tablet Take 50 mg by mouth. Crystal Shone omeprazole (PRILOSEC) 20 MG delayed release capsule Take 20 mg by mouth daily      Multiple Minerals-Vitamins (CALCIUM & VIT D3 BONE HEALTH PO) Take by mouth      Cholecalciferol (VITAMIN D3) 2000 UNITS CAPS Take by mouth      vitamin B-12 (CYANOCOBALAMIN) 1000 MCG tablet Take 1,000 mcg by mouth daily      aspirin 81 MG tablet Take 81 mg by mouth 2 times daily      folic acid (FOLVITE) 1 MG tablet Take 1 mg by mouth daily      Fluticasone Furoate-Vilanterol (BREO ELLIPTA) 200-25 MCG/INH AEPB Inhale into the lungs      azelastine HCl 0.15 % SOLN by Nasal route      cilostazol (PLETAL) 100 MG tablet Take 100 mg by mouth 2 times daily       No current facility-administered medications for this visit.      Facility-Administered Medications Ordered in Other Visits   Medication Dose Route Frequency Provider Last Rate Last Admin    0.9 % sodium chloride infusion  20 mL/hr IntraVENous Once Dianna Soliman MD 20 mL/hr at 01/31/22 1132 20 mL/hr at 01/31/22 1132    pembrolizumab (Rebecca Ponce) 200 mg in sodium chloride 0.9 % 100 mL chemo IVPB  200 mg IntraVENous Once Dianna Soliman  mL/hr at 01/31/22 1144 200 mg at 01/31/22 1144    PACLitaxel (TAXOL) 300 mg in sodium chloride 0.9 % 500 mL chemo infusion  300 mg IntraVENous Once Dianna Soliman MD        CARBOplatin (PARAPLATIN) 550 mg in sodium chloride 0.9 % 250 mL chemo IVPB  550 mg IntraVENous Once Patsy Macedo MD        sodium chloride flush 0.9 % injection 5-40 mL  5-40 mL IntraVENous PRN Patsy Macedo MD        heparin flush 100 UNIT/ML injection 500 Units  500 Units IntraCATHeter PRN Patsy Macedo MD           Allergies: Allergies   Allergen Reactions    Zithromax [Azithromycin]      abd pain.  Penicillins Rash     And pain      Shellfish-Derived Products Rash       Subjective   REVIEW OF SYSTEMS:   CONSTITUTIONAL: no fever, no night sweats,  Fatigue, insomnia;  HEENT: no blurring of vision, no double vision, no hearing difficulty, no tinnitus, no ulceration, no dysplasia, no epistaxis, hoarseness;   LUNGS: no cough, no hemoptysis, no wheeze,  no shortness of breath;  CARDIOVASCULAR: no palpitation, no chest pain, no shortness of breath;  GI: no abdominal pain, no nausea, no vomiting, no diarrhea,  constipation;  LLUVIA: no dysuria, no hematuria, no frequency or urgency, no nephrolithiasis;  MUSCULOSKELETAL:  joint pain, no swelling, no stiffness;  ENDOCRINE: no polyuria, no polydipsia, no cold or heat intolerance;  HEMATOLOGY: no easy bruising or bleeding, no history of clotting disorder;  DERMATOLOGY: no skin rash, no eczema, no pruritus;  PSYCHIATRY: no depression,  anxiety, no panic attacks, no suicidal ideation, no homicidal ideation;  NEUROLOGY: no syncope, no seizures, no numbness or tingling of hands, no numbness or tingling of feet, no paresis;       Objective   /68 (Site: Right Upper Arm, Position: Sitting)   Pulse 89   Temp 97.9 °F (36.6 °C) (Oral)   Resp 18   Ht 5' 7\" (1.702 m)   Wt 143 lb 6.4 oz (65 kg)   SpO2 94%   BMI 22.46 kg/m²        PHYSICAL EXAM:  CONSTITUTIONAL: Alert, appropriate, no acute distress  EYES: Non icteric, EOM intact, pupils equal round   ENT: Mucus membranes moist, no oral pharyngeal lesions, external inspection of ears and nose are normal.  NECK: Supple, no masses.   No palpable thyroid mass  CHEST/LUNGS: CTA bilaterally, normal respiratory effort   CARDIOVASCULAR: RRR, no murmurs. No lower extremity edema  ABDOMEN: soft non-tender, active bowel sounds, no HSM. No palpable masses  EXTREMITIES: warm, full ROM in all 4 extremities, no focal weakness. SKIN: warm, dry with no rashes or lesions  LYMPH: No cervical, clavicular, axillary, or inguinal lymphadenopathy  NEUROLOGIC: follows commands, non focal   PSYCH: mood and affect appropriate.   Alert and oriented to time, place, person    LABORATORY RESULTS REVIEWED/ANALYZED BY ME:  Lab Results   Component Value Date    TSH 1.020 01/10/2022     Lab Results   Component Value Date    WBC 8.84 01/31/2022    HGB 12.0 (L) 01/31/2022    HCT 34.5 (L) 01/31/2022    MCV 93.0 (H) 01/31/2022     (L) 01/31/2022     Lab Results   Component Value Date    NEUTROABS 7.72 (H) 01/31/2022     Lab Results   Component Value Date     01/31/2022    K 3.9 01/31/2022     01/31/2022    CO2 23 01/31/2022    BUN 20 01/31/2022    CREATININE 0.7 01/31/2022    GLUCOSE 164 (H) 01/31/2022    CALCIUM 9.3 01/31/2022    PROT 7.3 01/31/2022    LABALBU 4.2 01/31/2022    BILITOT 0.3 01/31/2022    ALKPHOS 75 01/31/2022    AST 27 01/31/2022    ALT 21 01/31/2022    LABGLOM >60 01/31/2022    GFRAA >60 11/24/2021    GLOB 3.1 01/31/2022         RADIOLOGY STUDIES REVIEWED BY ME:  None   ASSESSMENT:    Orders Placed This Encounter   Procedures    CT CHEST W CONTRAST     Standing Status:   Future     Standing Expiration Date:   1/31/2023     Scheduling Instructions:      sched 2 days prior to next visit at [facility]     Order Specific Question:   Reason for exam:     Answer:   assess response to treatment    CBC Auto Differential     Standing Status:   Future     Number of Occurrences:   1     Standing Expiration Date:   1/31/2023    Comprehensive Metabolic Panel     Standing Status:   Future     Number of Occurrences:   1     Standing Expiration Date:   1/31/2023    TSH without Reflex      Pati Monterroso was seen today for lung cancer. Diagnoses and all orders for this visit:    Squamous cell carcinoma of left lung (HCC)  -     CBC Auto Differential; Future  -     Comprehensive Metabolic Panel; Future  -     Cancel: TSH without Reflex; Future  -     CT CHEST W CONTRAST; Future  -     LORazepam (ATIVAN) 0.5 MG tablet; Take 1 tablet by mouth every 8 hours as needed for Anxiety for up to 30 days. Chemotherapy management, encounter for    Encounter for antineoplastic immunotherapy    Adverse effect of chemotherapy, subsequent encounter    Care plan discussed with patient    Anxiety in cancer patient  -     LORazepam (ATIVAN) 0.5 MG tablet; Take 1 tablet by mouth every 8 hours as needed for Anxiety for up to 30 days. Insomnia due to medical condition    Stage IV squamous cell carcinoma of left lung (HCC)    Anxiety    Hoarseness    Contrast media allergy  -     predniSONE (DELTASONE) 50 MG tablet; Take 1 tablet by mouth 24 hrs, 12 hrs and 1 hr prior to CT scan  -     diphenhydrAMINE (BENADRYL) 50 MG tablet; Take 1 tablet 1 hour prior to CT scan    Antineoplastic chemotherapy induced anemia    Other orders  -     TSH without Reflex      Non-small cell lung cancer, SCC [id number] stage IV (left thyroid, skin subcutaneous nodule) %  Essentially, findings of squamous cell non-small cell lung cancer left lung with skin metastasis/left thyroid metastasis  Reviewed pathology skin nodule/left thyroid mass which were both consistent with metastatic squamous cell carcinoma. Recommended:  · Carboplatin AUC 5  · Paclitaxel 175 mg/m²  · Keytruda 200mg IV   X4 cycles  every 3 weeks  To be followed by maintenance Keytruda 200 mg IV every 3 weeks  Discussed at length about logistics/side effects of treatment. Repeat CT chest before cycle #4 to assess disease response. Chemotherapy related adverse events-fatigue, myalgia, arthralgia, nausea, insomnia, joint pain     Headaches- resolved.   MRI brain was unremarkable    Cancer related anxiety-Recommend Ativan 0.5mg as needed        PLAN:  · RTC with MD 3 weeks in treatment room  · C#3 CarboTaxol Keytruda today and every 3 weeks  · CBC/CMP/TSH today   · Continue Zofran 4mg for nausea- script sent  · Repeat CT scans 2 weeks-premed given  · Continue follow-up with Dr. Ludivina Grijalva  · Recommend OTC Benadryl and/or Melatonin for sleep  · Recommend Ativan 0.5mg every 8 hrs as needed-script sent         I, Darcia Boxer, am pre-charting as a registered nurse for Luanne Medellin MD. Electronically signed by Darcia Boxer, RN on 1/31/2022 at 8:49 PM CST. Bee Rene am scribing for Luanne Medellin MD. Electronically signed by Darcia Boxer, RN on 1/31/2022 at 11:44 AM CST. I, Dr Ruma Corrigan, personally performed the services described in this documentation as scribed by Darcia Boxer, RN in my presence and is both accurate and complete. I have seen, examined and reviewed this patient medication list, appropriate labs and imaging studies. I reviewed relevant medical records and others physicians notes. I discussed the plans of care with the patient. I answered all the questions to the patients satisfaction. I have also reviewed the chief complaint (CC) and part of the history (History of Present Illness (HPI), Past Family Social History [facility]), or Review of Systems (ROS) and made changes when appropriated. (Please note that portions of this note were completed with a voice recognition program. Efforts were made to edit the dictations but occasionally words are mis-transcribed. )Electronically signed by Luanne Medellin MD on 1/31/2022 at 12:00 PM

## 2022-01-31 ENCOUNTER — OFFICE VISIT (OUTPATIENT)
Dept: HEMATOLOGY | Age: 77
End: 2022-01-31
Payer: OTHER GOVERNMENT

## 2022-01-31 ENCOUNTER — HOSPITAL ENCOUNTER (OUTPATIENT)
Dept: INFUSION THERAPY | Age: 77
Discharge: HOME | End: 2022-01-31
Payer: OTHER GOVERNMENT

## 2022-01-31 VITALS
WEIGHT: 143.4 LBS | SYSTOLIC BLOOD PRESSURE: 109 MMHG | BODY MASS INDEX: 22.51 KG/M2 | HEIGHT: 67 IN | RESPIRATION RATE: 18 BRPM | DIASTOLIC BLOOD PRESSURE: 68 MMHG | HEART RATE: 89 BPM | TEMPERATURE: 97.9 F | OXYGEN SATURATION: 94 %

## 2022-01-31 DIAGNOSIS — Z51.12 ENCOUNTER FOR ANTINEOPLASTIC IMMUNOTHERAPY: ICD-10-CM

## 2022-01-31 DIAGNOSIS — R49.0 HOARSENESS: ICD-10-CM

## 2022-01-31 DIAGNOSIS — G47.01 INSOMNIA DUE TO MEDICAL CONDITION: ICD-10-CM

## 2022-01-31 DIAGNOSIS — T45.1X5A ANTINEOPLASTIC CHEMOTHERAPY INDUCED ANEMIA: ICD-10-CM

## 2022-01-31 DIAGNOSIS — Z91.041 CONTRAST MEDIA ALLERGY: ICD-10-CM

## 2022-01-31 DIAGNOSIS — C34.92 SQUAMOUS CELL CARCINOMA OF LEFT LUNG (HCC): Primary | ICD-10-CM

## 2022-01-31 DIAGNOSIS — D64.81 ANTINEOPLASTIC CHEMOTHERAPY INDUCED ANEMIA: ICD-10-CM

## 2022-01-31 DIAGNOSIS — F41.9 ANXIETY IN CANCER PATIENT: ICD-10-CM

## 2022-01-31 DIAGNOSIS — Z71.89 CARE PLAN DISCUSSED WITH PATIENT: ICD-10-CM

## 2022-01-31 DIAGNOSIS — T45.1X5D ADVERSE EFFECT OF CHEMOTHERAPY, SUBSEQUENT ENCOUNTER: ICD-10-CM

## 2022-01-31 DIAGNOSIS — Z51.11 CHEMOTHERAPY MANAGEMENT, ENCOUNTER FOR: ICD-10-CM

## 2022-01-31 DIAGNOSIS — F41.9 ANXIETY: ICD-10-CM

## 2022-01-31 DIAGNOSIS — C34.92 STAGE IV SQUAMOUS CELL CARCINOMA OF LEFT LUNG (HCC): ICD-10-CM

## 2022-01-31 LAB
ALBUMIN SERPL-MCNC: 4.2 G/DL (ref 3.5–5.2)
ALP BLD-CCNC: 75 U/L (ref 40–130)
ALT SERPL-CCNC: 21 U/L (ref 21–72)
ANION GAP SERPL CALCULATED.3IONS-SCNC: 10 MMOL/L (ref 7–19)
AST SERPL-CCNC: 27 U/L (ref 17–59)
BASOPHILS ABSOLUTE: 0.01 K/UL (ref 0.01–0.08)
BASOPHILS RELATIVE PERCENT: 0.1 % (ref 0.1–1.2)
BILIRUB SERPL-MCNC: 0.3 MG/DL (ref 0.2–1.3)
BUN BLDV-MCNC: 20 MG/DL (ref 9–20)
CALCIUM SERPL-MCNC: 9.3 MG/DL (ref 8.4–10.2)
CHLORIDE BLD-SCNC: 106 MMOL/L (ref 98–111)
CO2: 23 MMOL/L (ref 22–29)
CREAT SERPL-MCNC: 0.7 MG/DL (ref 0.6–1.2)
EOSINOPHILS ABSOLUTE: 0 K/UL (ref 0.04–0.54)
EOSINOPHILS RELATIVE PERCENT: 0 % (ref 0.7–7)
GFR NON-AFRICAN AMERICAN: >60
GLOBULIN: 3.1 G/DL
GLUCOSE BLD-MCNC: 164 MG/DL (ref 74–106)
HCT VFR BLD CALC: 34.5 % (ref 40.1–51)
HEMOGLOBIN: 12 G/DL (ref 13.7–17.5)
LYMPHOCYTES ABSOLUTE: 0.99 K/UL (ref 1.18–3.74)
LYMPHOCYTES RELATIVE PERCENT: 11.2 % (ref 19.3–53.1)
MCH RBC QN AUTO: 32.3 PG (ref 25.7–32.2)
MCHC RBC AUTO-ENTMCNC: 34.8 G/DL (ref 32.3–36.5)
MCV RBC AUTO: 93 FL (ref 79–92.2)
MONOCYTES ABSOLUTE: 0.12 K/UL (ref 0.24–0.82)
MONOCYTES RELATIVE PERCENT: 1.4 % (ref 4.7–12.5)
NEUTROPHILS ABSOLUTE: 7.72 K/UL (ref 1.56–6.13)
NEUTROPHILS RELATIVE PERCENT: 87.3 % (ref 34–71.1)
PDW BLD-RTO: 15.6 % (ref 11.6–14.4)
PLATELET # BLD: 146 K/UL (ref 163–337)
PMV BLD AUTO: 9 FL (ref 7.4–10.4)
POTASSIUM SERPL-SCNC: 3.9 MMOL/L (ref 3.5–5.1)
RBC # BLD: 3.71 M/UL (ref 4.63–6.08)
SODIUM BLD-SCNC: 139 MMOL/L (ref 137–145)
TOTAL PROTEIN: 7.3 G/DL (ref 6.3–8.2)
TSH SERPL DL<=0.05 MIU/L-ACNC: 1.14 UIU/ML (ref 0.27–4.2)
WBC # BLD: 8.84 K/UL (ref 4.23–9.07)

## 2022-01-31 PROCEDURE — 85025 COMPLETE CBC W/AUTO DIFF WBC: CPT

## 2022-01-31 PROCEDURE — 96417 CHEMO IV INFUS EACH ADDL SEQ: CPT

## 2022-01-31 PROCEDURE — 2500000003 HC RX 250 WO HCPCS: Performed by: INTERNAL MEDICINE

## 2022-01-31 PROCEDURE — 6360000002 HC RX W HCPCS: Performed by: INTERNAL MEDICINE

## 2022-01-31 PROCEDURE — 96375 TX/PRO/DX INJ NEW DRUG ADDON: CPT

## 2022-01-31 PROCEDURE — 80053 COMPREHEN METABOLIC PANEL: CPT

## 2022-01-31 PROCEDURE — 2580000003 HC RX 258: Performed by: INTERNAL MEDICINE

## 2022-01-31 PROCEDURE — 96413 CHEMO IV INFUSION 1 HR: CPT

## 2022-01-31 PROCEDURE — 99214 OFFICE O/P EST MOD 30 MIN: CPT | Performed by: INTERNAL MEDICINE

## 2022-01-31 PROCEDURE — 36415 COLL VENOUS BLD VENIPUNCTURE: CPT

## 2022-01-31 PROCEDURE — 96415 CHEMO IV INFUSION ADDL HR: CPT

## 2022-01-31 RX ORDER — DEXAMETHASONE SODIUM PHOSPHATE 10 MG/ML
10 INJECTION, SOLUTION INTRAMUSCULAR; INTRAVENOUS ONCE
Status: COMPLETED | OUTPATIENT
Start: 2022-01-31 | End: 2022-01-31

## 2022-01-31 RX ORDER — PREDNISONE 50 MG/1
TABLET ORAL
Qty: 3 TABLET | Refills: 0 | Status: SHIPPED | OUTPATIENT
Start: 2022-01-31 | End: 2022-04-04

## 2022-01-31 RX ORDER — LORAZEPAM 0.5 MG/1
0.5 TABLET ORAL EVERY 8 HOURS PRN
Qty: 30 TABLET | Refills: 0 | Status: SHIPPED | OUTPATIENT
Start: 2022-01-31 | End: 2022-03-02

## 2022-01-31 RX ORDER — HEPARIN SODIUM (PORCINE) LOCK FLUSH IV SOLN 100 UNIT/ML 100 UNIT/ML
500 SOLUTION INTRAVENOUS PRN
Status: DISCONTINUED | OUTPATIENT
Start: 2022-01-31 | End: 2022-02-01 | Stop reason: HOSPADM

## 2022-01-31 RX ORDER — SODIUM CHLORIDE 9 MG/ML
20 INJECTION, SOLUTION INTRAVENOUS ONCE
Status: COMPLETED | OUTPATIENT
Start: 2022-01-31 | End: 2022-02-01

## 2022-01-31 RX ORDER — PALONOSETRON 0.05 MG/ML
0.25 INJECTION, SOLUTION INTRAVENOUS ONCE
Status: COMPLETED | OUTPATIENT
Start: 2022-01-31 | End: 2022-01-31

## 2022-01-31 RX ORDER — SODIUM CHLORIDE 0.9 % (FLUSH) 0.9 %
5-40 SYRINGE (ML) INJECTION PRN
Status: DISCONTINUED | OUTPATIENT
Start: 2022-01-31 | End: 2022-02-01 | Stop reason: HOSPADM

## 2022-01-31 RX ORDER — DIPHENHYDRAMINE HYDROCHLORIDE 50 MG/ML
25 INJECTION INTRAMUSCULAR; INTRAVENOUS ONCE
Status: COMPLETED | OUTPATIENT
Start: 2022-01-31 | End: 2022-01-31

## 2022-01-31 RX ADMIN — SODIUM CHLORIDE 200 MG: 9 INJECTION, SOLUTION INTRAVENOUS at 11:44

## 2022-01-31 RX ADMIN — FAMOTIDINE 20 MG: 10 INJECTION, SOLUTION INTRAVENOUS at 11:30

## 2022-01-31 RX ADMIN — PACLITAXEL 300 MG: 6 INJECTION, SOLUTION, CONCENTRATE INTRAVENOUS at 12:16

## 2022-01-31 RX ADMIN — PALONOSETRON HYDROCHLORIDE 0.25 MG: 0.25 INJECTION, SOLUTION INTRAVENOUS at 11:31

## 2022-01-31 RX ADMIN — DIPHENHYDRAMINE HYDROCHLORIDE 25 MG: 50 INJECTION, SOLUTION INTRAMUSCULAR; INTRAVENOUS at 11:30

## 2022-01-31 RX ADMIN — CARBOPLATIN 550 MG: 10 INJECTION, SOLUTION INTRAVENOUS at 15:28

## 2022-01-31 RX ADMIN — DEXAMETHASONE SODIUM PHOSPHATE 10 MG: 10 INJECTION INTRAMUSCULAR; INTRAVENOUS at 11:30

## 2022-01-31 RX ADMIN — SODIUM CHLORIDE 20 ML/HR: 9 INJECTION, SOLUTION INTRAVENOUS at 11:32

## 2022-02-02 NOTE — PROGRESS NOTES
Speech Language Pathology Treatment Note    Patient: Chucky Jamison                                                                                     Visit Date: 2022  :     1945    Referring practitioner:    Blair Walsh MD  Date of Initial Visit:          Type: THERAPY  Noted: 2022    Patient seen for 2 sessions    Visit Diagnoses:    ICD-10-CM ICD-9-CM   1. Oropharyngeal dysphagia  R13.12 787.22     SUBJECTIVE     First therapy session today. Patient was accompanied by his wife.     OBJECTIVE   GOALS  Goals                                            STG  Comments Status   Patient will improve oral skills to enhance safety and increase eating efficiency and bolus control as measured by improved posterior propulsion of the bolus.  Introduced tongue push and tongue elevation exercises as well as tongue pressure with effortful swallow. Patient able to demonstrate after instruction and cues.  New   Patient will improve epiglottic inversion, increase base of the tongue strength and posterior pharyngeal wall excursion and increase efficiency of cough to clear residue from the airway as measured by decreased overt signs and symptoms of aspiration and decreased penetration and aspiration on repeat instrumental swallow study, if applicable.   Patient able to demonstrate effortful swallow, Mendelson, and supersupraglotic swallow after instruction and cues.   Unable to achieve glottal closure with resistance exercise.  New   Patient will report decreased difficulty with swallowing and improved EAT-10 score.  Previous: Eat 10 score 28 Ongoing           LTG        Patient will safely consume full PO diet of regular consistency food and thin liquids without difficulty or complications such as aspiration or pneumonia.  Goals and exercises introduced today New         Therapy Education/Self Care    Details: POC   Given Home Exercise Program  Access Code [email or web address]   Progress: New   Education provided to:  Patient   Level of understanding Verbalized             Total Time of Visit:             35  mins         ASSESSMENT/PLAN     ASSESSMENT: Patient was able to demonstrate swallowing exercises after instruction and cues.     PLAN: Continue therapy and home exercises.     Progress Note Due Date:2/23/22  Recertification Due Date:4/23/22    SIGNATURE: Bonita Cohen CCC-SLP, License #: [id number]  Electronically Signed on 2/2/2022          [address] [place] [address] [place] [address]  [phone]

## 2022-02-09 NOTE — PROGRESS NOTES
Speech Language Pathology Treatment Note    Patient: Chucky Jamison                                                                                     Visit Date: 2022  :     1945    Referring practitioner:    Blair Walsh MD  Date of Initial Visit:          Type: THERAPY  Noted: 2022    Patient seen for 3 sessions    Visit Diagnoses:    ICD-10-CM ICD-9-CM   1. Oropharyngeal dysphagia  R13.12 787.22     SUBJECTIVE     Patient had been without power most of the week and was not able to work on his exercises. He stated it is better now.     OBJECTIVE   GOALS  Goals                                            STG  Comments Status   Patient will improve oral skills to enhance safety and increase eating efficiency and bolus control as measured by improved posterior propulsion of the bolus. Patient able to demonstrate tongue push exercises with minimal cues.  Progressing.    Patient will improve epiglottic inversion, increase base of the tongue strength and posterior pharyngeal wall excursion and increase efficiency of cough to clear residue from the airway as measured by decreased overt signs and symptoms of aspiration and decreased penetration and aspiration on repeat instrumental swallow study, if applicable.   Patient able to demonstrate effortful swallow x15  Mendelson x 10, and supersupraglotic swallow x 15 with moderate cues. Needed sips of water to initiate sets.  Progressing   Patient will report decreased difficulty with swallowing and improved EAT-10 score.  Previous: Eat 10 score 28 Ongoing           LTG        Patient will safely consume full PO diet of regular consistency food and thin liquids without difficulty or complications such as aspiration or pneumonia.  Goals and exercises reviewed today. Patient able to demonstrate after cues.  Ongoing         Therapy Education/Self Care    Details: POC   Given Home Exercise  Program Access Code [email or web address]   Progress: Reinforced   Education provided to:  Patient   Level of understanding Verbalized             Total Time of Visit:             40  mins         ASSESSMENT/PLAN     ASSESSMENT: Patient was able to demonstrate swallowing exercises with moderate cues.     PLAN: Continue therapy and home exercises.     Progress Note Due Date:2/23/22  Recertification Due Date:4/23/22    SIGNATURE: Bonita Cohen CCC-SLP, License #: [id number]  Electronically Signed on 2/9/2022          [address] [place] [address] [place] [address]  [phone]

## 2022-02-16 ENCOUNTER — TELEPHONE (OUTPATIENT)
Dept: INFUSION THERAPY | Age: 77
End: 2022-02-16

## 2022-02-16 NOTE — TELEPHONE ENCOUNTER
Patients wife called and states patient has a slight fever of 99.2 with complaints of lower back pain. Wife wanted to know if we would call in some antibiotics. I suggested wife take patient to primary care physician to be examined. Patients wife stat she will make an appointment with the primary care physician. I told wife to call us and let us know what the MD said as patient is to have treatment on Monday.  Electronically signed by Craig Espinal RN on 2/16/2022 at 9:44 AM

## 2022-02-18 ENCOUNTER — HOSPITAL ENCOUNTER (OUTPATIENT)
Dept: CT IMAGING | Age: 77
Discharge: HOME | End: 2022-02-18
Payer: OTHER GOVERNMENT

## 2022-02-18 DIAGNOSIS — C34.92 SQUAMOUS CELL CARCINOMA OF LEFT LUNG (HCC): ICD-10-CM

## 2022-02-18 PROCEDURE — 71260 CT THORAX DX C+: CPT

## 2022-02-18 PROCEDURE — 6360000004 HC RX CONTRAST MEDICATION: Performed by: INTERNAL MEDICINE

## 2022-02-18 RX ADMIN — IOPAMIDOL 60 ML: 755 INJECTION, SOLUTION INTRAVENOUS at 10:27

## 2022-02-18 NOTE — PROGRESS NOTES
MEDICAL ONCOLOGY PROGRESS NOTE    Pt Name: Medina Granados  MRN: [id number]  YOB: 1945  Date of evaluation: 2/21/2022      HISTORY OF PRESENT ILLNESS:    Reason for MD visit-toxicity assessment/disease management    The patient is a pleasant 78years old male who presents today for consideration of cycle #3 of chemotherapy with carboplatin, Taxol and pembrolizumab. He presented today for cycle #3. Patient has been tolerating treatments complains of fatigue, muscle pain, joint pains. He also has alopecia. He had CT scans performed. Diagnosis  · Squamous cell carcinoma, Left lung, Nov 2021  · [id number], stage IV (left thyroid nodule, skin metastasis)  · 11/5/21-PD-L1 Expressed 35%    Treatment Summary  · 12/20/21- Initiated Carboplatin AUC 5 Paclitaxel 175mg/m2 and Keytruda      Cancer History  Pamela Vásquez was first seen by me on 11/15/2021. He was referred by pulmonary at [facility] for a diagnosis of lung cancer. He developed complaints of cough. Chest x-ray was performed and showed a lung mass. Patient is a currently smoker. He lost about 25 pounds. · 9/28/21 CXR [facility]): Questionable mass density seen only on the lateral view with patchy density in left upper lobe. Further evaluation with computed tomography is recommended. COPD. · 10/4/21 CT chest [facility]): Mass lesion left hilum extending caudally along the mediastinum and inseparable from the mediastinum in this region. This is highly suspicious for malignancy. Consolidation and/or neoplastic mass left upper lobe as described above Emphysema. New right thyroid nodule, probably solid. 2 chronic cysts within the liver. · 10/22/21- PET scan [facility], [facility]): Essentially, mass in the right side of the neck of the neck base measuring 2.8 cm x 2.4 cm. SUV 18.9. Also small focus soft tissue of the back at the base of the neck on the right. Lesion measured 9 mm. SUV 10.5.  Large mass in the left hilum measures 5.1 x 4.2 cm (SUV 18.6). Extensive parenchymal abnormality with a high SUV uptake in the left upper lobe. Apical component with a small amount of cavitation measuring 2.9 x 2.6 cm with SUV 11.3.  · 11/5/21 Lung, left mainstem bronchus biopsy with touch imprint smears: Invasive keratinizing squamous cell carcinoma. Lung, fine-needle aspiration of left hilar mass, ThinPrep and cell block: Keratinizing squamous cell carcinoma. Lung, left mainstem bronchial washing, ThinPrep and cell block: Keratinizing squamous cell carcinoma. · 11/15/2021-he was first seen by me. Recommended biopsy of right neck mass and also subcutaneous nodule right upper back. Discussed with ENT, Dr. Onel Ocampo. · 11/17/2021 Final Diagnosis: Excisional biopsy of a metastatic nodul in the thyroid gland as well as metastatic skin nodule. Right posterior lower neck\": Metastatic keratinizing squamous cell carcinoma involving fibroadipose tissue. 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 · 11/24/2021 MRI Brain W Wo Contrast No acute intracranial abnormality, mass or acute infarction. Chronic ischemic and atrophic changes. · 12/6/2021-essentially, stage IV disease. Squamous cell carcinoma PDL 35% metastatic left upper back skin nodule consistent with squamous cell carcinoma. Left thyroid mass consistent with metastatic, cell carcinoma. Recommended frontline chemotherapy with carboplatin AUC 5, paclitaxel 175 mg/m², Keytruda every 3 weeks x4 cycles to be followed by maintenance Keytruda if stable disease. · 12/9/2021 2D Echo Normal left ventricular size and systolic function EF 55 to 60%. · 12/20/2021-initiation of palliative chemotherapy with carboplatin/Taxol/Keytruda. · 2/18/22 CT CHEST W CONTRAST A mid to lower left hilar mass is smaller in size. Specifically,a central mass in the lower left hilum now measures 5 x 3.2 cm and previously measured 6 x 4.5 cm.  Somewhat curvilinear opacities in both upper lobes, worsening on the left and developing on the right when compared to the prior study. There is an increasing area of similar opacity in the left upper lobe located slightly more superiorly now measuring 2.2 x 1.4 cm and previously measuring about 1.2 x 1.5 cm. There is another new area that has developed more inferiorly in the left upper lobe measuring 1.6 x 1 cm. There are similar appearing new developing  smewhat linear opacities in the right upper lobeGiven the curvilinear appearance and fine calcifications, progressive massive fibrosis is the leading differential. Infectious etiology cannot be ruled out, including atypical infections, such as TB. Pulmonary consultation recommended for this finding. Neoplastic disease is felt to be less likely given the appearance and bilateral upper lobe involvement. There is a stable subpleural right upper lobe nodule measuring 4-5 mm. Severe centrilobular emphysema. Atheromatous disease of the thoracic aorta and coronary arteries. No mediastinal lymphadenopathy is seen. Small densities in the trachea on the right and posteriorly are likely retained secretions. Interim right thyroidectomy. · 2/21/2022-essentially, interval response to treatment.   Continue carboplatin/Taxol and Keytruda    Past Medical History:    Past Medical History:   Diagnosis Date    CAD (coronary artery disease)     RUFINO to LCX 1/13    Hyperlipidemia     Lung cancer ([address]     Nicotine dependence, cigarettes, w unsp disorders 12/1/2017    Prostate cancer ([address]     PVD (peripheral vascular disease) ([address]     Tobacco abuse        Past Surgical History:    Past Surgical History:   Procedure Laterality Date    BRONCHOSCOPY Left 11/5/2021    BRONCHOSCOPY ENDOBRONCHIAL ULTRASOUND performed by Pankaj Damon MD at [facility]   Loraine Gens  02/02/2013    EF 65%, patent stent to LCX, no new occlusive disease    CATARACT REMOVAL WITH IMPLANT Bilateral 2014    CORONARY ANGIOPLASTY  01/28/2013    RUFINO to LCX    HERNIA REPAIR      HX VASCULAR STENT  2013    LUNG BIOPSY  11/05/2021    PROSTATE SURGERY      SHOULDER SURGERY Left        Social History:    Marital status:   Smoking status:Yes; 61 years; 5-6 a day  ETOH status:No  Resides:[facility]    Family History:   Family History   Problem Relation Age of Onset    Hypertension Mother     Stroke Mother     Cancer Brother 61        Lung    Cancer Brother 79        Prostate       Current Hospital Medications:    Current Outpatient Medications   Medication Sig Dispense Refill    LORazepam (ATIVAN) 0.5 MG tablet Take 1 tablet by mouth every 8 hours as needed for Anxiety for up to 30 days. 30 tablet 0    predniSONE (DELTASONE) 50 MG tablet Take 1 tablet by mouth 24 hrs, 12 hrs and 1 hr prior to CT scan 3 tablet 0    diphenhydrAMINE (BENADRYL) 50 MG tablet Take 1 tablet 1 hour prior to CT scan 1 tablet 0    metoprolol tartrate (LOPRESSOR) 25 MG tablet TAKE 1 TABLET BY MOUTH TWICE A  tablet 0    gabapentin (NEURONTIN) 100 MG capsule Take 1 capsule by mouth 2 times daily for 30 days. 60 capsule 0    ondansetron (ZOFRAN) 4 MG tablet Take 1 tablet by mouth daily as needed for Nausea or Vomiting 30 tablet 0    dexamethasone (DECADRON) 4 MG tablet Take 5 tablets the night before and 5 tablets the morning of treatment every 3 weeks x 4 cycles 40 tablet 0    nitroGLYCERIN (NITROSTAT) 0.4 MG SL tablet Place 1 tablet under the tongue every 5 minutes as needed for Chest pain As directed 25 tablet 5    ezetimibe (ZETIA) 10 MG tablet Take 10 mg by mouth daily      montelukast (SINGULAIR) 10 MG tablet Take 10 mg by mouth nightly      rosuvastatin (CRESTOR) 10 MG tablet Take 20 mg by mouth daily       calcium citrate (CALCITRATE) 950 MG tablet Take 1 tablet by mouth daily      metaxalone (SKELAXIN) 800 MG tablet Take 800 mg by mouth      traMADol (ULTRAM) 50 MG tablet Take 50 mg by mouth. Arloa Adan omeprazole (PRILOSEC) 20 MG delayed release capsule Take 20 mg by mouth daily      Multiple Minerals-Vitamins (CALCIUM & VIT D3 BONE HEALTH PO) Take by mouth      Cholecalciferol (VITAMIN D3) 2000 UNITS CAPS Take by mouth      vitamin B-12 (CYANOCOBALAMIN) 1000 MCG tablet Take 1,000 mcg by mouth daily      aspirin 81 MG tablet Take 81 mg by mouth 2 times daily      folic acid (FOLVITE) 1 MG tablet Take 1 mg by mouth daily      Fluticasone Furoate-Vilanterol (BREO ELLIPTA) 200-25 MCG/INH AEPB Inhale into the lungs      azelastine HCl 0.15 % SOLN by Nasal route      cilostazol (PLETAL) 100 MG tablet Take 100 mg by mouth 2 times daily       No current facility-administered medications for this visit. Facility-Administered Medications Ordered in Other Visits   Medication Dose Route Frequency Provider Last Rate Last Admin    0.9 % sodium chloride infusion  20 mL/hr IntraVENous Once Breanne Tejada MD 20 mL/hr at 02/21/22 1223 20 mL/hr at 02/21/22 1223    pembrolizumab (Saniya Shore) 200 mg in sodium chloride 0.9 % 100 mL chemo IVPB  200 mg IntraVENous Once Breanne Tejada MD        PACLitaxel (TAXOL) 300 mg in sodium chloride 0.9 % 500 mL chemo infusion  300 mg IntraVENous Once Breanne Tejada MD        CARBOplatin (PARAPLATIN) 600 mg in sodium chloride 0.9 % 250 mL chemo IVPB  600 mg IntraVENous Once Breanne Tejada MD           Allergies: Allergies   Allergen Reactions    Zithromax [Azithromycin]      abd pain.      Penicillins Rash     And pain      Shellfish-Derived Products Rash       Subjective   REVIEW OF SYSTEMS:   CONSTITUTIONAL: no fever, no night sweats,  fatigue;  HEENT: no blurring of vision, no double vision, no hearing difficulty, no tinnitus, no ulceration, no dysplasia, no epistaxis;   LUNGS: no cough, no hemoptysis, no wheeze,  no shortness of breath;  CARDIOVASCULAR: no palpitation, no chest pain, no shortness of breath;  GI: no abdominal pain, no nausea, no vomiting, no diarrhea, no constipation;  LLUVIA: no dysuria, no hematuria, no frequency or urgency, no nephrolithiasis;  MUSCULOSKELETAL: no joint pain, no swelling, no stiffness;  ENDOCRINE: no polyuria, no polydipsia, no cold or heat intolerance;  HEMATOLOGY: no easy bruising or bleeding, no history of clotting disorder;  DERMATOLOGY: no skin rash, no eczema, no pruritus;  PSYCHIATRY: no depression, no anxiety, no panic attacks, no suicidal ideation, no homicidal ideation;  NEUROLOGY: no syncope, no seizures, no numbness or tingling of hands, no numbness or tingling of feet, no paresis;       Objective   /71 (Site: Left Upper Arm, Position: Sitting)   Pulse 69   Temp 97.7 °F (36.5 °C) (Oral)   Resp 18   Ht 5' 7\" (1.702 m)   Wt 141 lb 11.2 oz (64.3 kg)   SpO2 96%   BMI 22.19 kg/m²        PHYSICAL EXAM:  CONSTITUTIONAL: Alert, appropriate, no acute distress  EYES: Non icteric, EOM intact, pupils equal round   ENT: Mucus membranes moist, no oral pharyngeal lesions, external inspection of ears and nose are normal.  NECK: Supple, no masses. No palpable thyroid mass  CHEST/LUNGS: CTA bilaterally, normal respiratory effort   CARDIOVASCULAR: RRR, no murmurs. No lower extremity edema  ABDOMEN: soft non-tender, active bowel sounds, no HSM. No palpable masses  EXTREMITIES: warm, full ROM in all 4 extremities, no focal weakness. SKIN: warm, dry with no rashes or lesions  LYMPH: No cervical, clavicular, axillary, or inguinal lymphadenopathy  NEUROLOGIC: follows commands, non focal   PSYCH: mood and affect appropriate.   Alert and oriented to time, place, person    LABORATORY RESULTS REVIEWED/ANALYZED BY ME:  Lab Results   Component Value Date    WBC 7.95 02/21/2022    HGB 10.5 (L) 02/21/2022    HCT 30.1 (L) 02/21/2022    MCV 97.1 (H) 02/21/2022     (L) 02/21/2022     Lab Results   Component Value Date    NEUTROABS 6.38 (H) 02/21/2022     Lab Results   Component Value Date     02/21/2022    K 4.2 02/21/2022     02/21/2022 CO2 25 02/21/2022    BUN 17 02/21/2022    CREATININE 0.6 02/21/2022    GLUCOSE 119 (H) 02/21/2022    CALCIUM 9.0 02/21/2022    PROT 5.9 (L) 02/21/2022    LABALBU 3.6 02/21/2022    BILITOT 0.3 02/21/2022    ALKPHOS 105 02/21/2022    AST 30 02/21/2022    ALT 20 (L) 02/21/2022    LABGLOM >60 02/21/2022    GFRAA >60 11/24/2021    GLOB 2.3 02/21/2022       RADIOLOGY STUDIES REVIEWED BY ME:  CT CHEST W CONTRAST    Result Date: 2/18/2022  1. A mid to lower left hilar mass is smaller in size. Size measurements are listed above. 2. Somewhat curvilinear opacities in both upper lobes, worsening on the left and developing on the right when compared to the prior study. Given the curvilinear appearance and fine calcifications, progressive massive fibrosis is the leading differential. Infectious etiology cannot be ruled out, including atypical infections, such as TB. Pulmonary consultation recommended for this finding. Neoplastic disease is felt to be less likely given the appearance and bilateral upper lobe involvement. 3. There is a stable subpleural right upper lobe nodule measuring 4-5 mm. 4. Severe centrilobular emphysema. 5. Atheromatous disease of the thoracic aorta and coronary arteries. No mediastinal lymphadenopathy is seen. Small densities in the trachea on the right and posteriorly are likely retained secretions. 6. Interim right thyroidectomy. Signed by Dr Cuco Carlisle      ASSESSMENT:    Orders Placed This Encounter   Procedures    CBC with Auto Differential     Standing Status:   Future     Standing Expiration Date:   2/21/2023    Comprehensive Metabolic Panel     Standing Status:   Future     Standing Expiration Date:   2/21/2023    TSH     Standing Status:   Future     Number of Occurrences:   1     Standing Expiration Date:   2/21/2023      Al Dong was seen today for lung cancer.     Diagnoses and all orders for this visit:    Squamous cell carcinoma of left lung (HCC)  -     CBC with Auto Differential; Future  -     Comprehensive Metabolic Panel; Future  -     TSH; Future    Chemotherapy management, encounter for    Encounter for antineoplastic immunotherapy    Care plan discussed with patient    Adverse effect of chemotherapy, subsequent encounter    Anxiety in cancer patient    Stage IV squamous cell carcinoma of left lung (HCC)    Hoarseness    Normocytic anemia      Non-small cell lung cancer, SCC [id number] stage IV (left thyroid, skin subcutaneous nodule) %  Essentially, findings of squamous cell non-small cell lung cancer left lung with skin metastasis/left thyroid metastasis  Reviewed pathology skin nodule/left thyroid mass which were both consistent with metastatic squamous cell carcinoma. Recommended:  · Carboplatin AUC 5  · Paclitaxel 175 mg/m²  · Keytruda 200mg IV   X4 cycles  every 3 weeks  To be followed by maintenance Keytruda 200 mg IV every 3 weeks  Discussed at length about logistics/side effects of treatment. Repeat CT chest before cycle #4 to assess disease response. Continue cycle #4 carboplatin/Taxol and pembrolizumab. Continue maintenance pembrolizumab only. Chemotherapy related adverse events-fatigue, myalgia, arthralgia, nausea, insomnia, joint pain     Headaches- resolved. MRI brain was unremarkable    Cancer related anxiety-Recommend Ativan 0.5mg as needed      PLAN:  · RTC with MD 6 weeks in treatment room  · C#4 CarboTaxol Keytruda today   · Initiate Pembrolizumab ONLY in 3 weeks and every 3 weeks  · CBC/CMP/TSH today   · Continue Zofran 4mg for nausea- script sent  · Repeat CT scans 2 weeks-premed given  · Continue follow-up with Dr. Sewell Diss  · Recommend OTC Benadryl and/or Melatonin for sleep  · Recommend Ativan 0.5mg every 8 hrs as needed-script sent         Polina WALSH am pre-charting as a registered nurse for Nicky Santos MD. Electronically signed by Polina Jaimes RN on 2/21/2022 at 7:02 AM CST.     Polina WALSH am scribing for [employer] Champ Closs, MD. Electronically signed by Sony Howard RN on 2/21/2022 at 12:16 PM CST. I, Dr Adama Monroy, personally performed the services described in this documentation as scribed by Sony Howard RN in my presence and is both accurate and complete. I have seen, examined and reviewed this patient medication list, appropriate labs and imaging studies. I reviewed relevant medical records and others physicians notes. I discussed the plans of care with the patient. I answered all the questions to the patients satisfaction. I have also reviewed the chief complaint (CC) and part of the history (History of Present Illness (HPI), Past Family Social History [facility]), or Review of Systems (ROS) and made changes when appropriated.        (Please note that portions of this note were completed with a voice recognition program. Efforts were made to edit the dictations but occasionally words are mis-transcribed.)    Electronically signed by Ade Vargas MD on 2/21/2022 at 12:34 PM

## 2022-02-21 ENCOUNTER — HOSPITAL ENCOUNTER (OUTPATIENT)
Dept: INFUSION THERAPY | Age: 77
Discharge: HOME | End: 2022-02-21
Payer: OTHER GOVERNMENT

## 2022-02-21 ENCOUNTER — OFFICE VISIT (OUTPATIENT)
Dept: HEMATOLOGY | Age: 77
End: 2022-02-21
Payer: OTHER GOVERNMENT

## 2022-02-21 VITALS
TEMPERATURE: 97.7 F | RESPIRATION RATE: 18 BRPM | DIASTOLIC BLOOD PRESSURE: 71 MMHG | SYSTOLIC BLOOD PRESSURE: 119 MMHG | WEIGHT: 141.7 LBS | HEIGHT: 67 IN | BODY MASS INDEX: 22.24 KG/M2 | OXYGEN SATURATION: 96 % | HEART RATE: 69 BPM

## 2022-02-21 DIAGNOSIS — C34.92 SQUAMOUS CELL CARCINOMA OF LEFT LUNG (HCC): ICD-10-CM

## 2022-02-21 DIAGNOSIS — Z51.12 ENCOUNTER FOR ANTINEOPLASTIC IMMUNOTHERAPY: ICD-10-CM

## 2022-02-21 DIAGNOSIS — R91.8 LUNG MASS: Primary | ICD-10-CM

## 2022-02-21 DIAGNOSIS — F41.9 ANXIETY IN CANCER PATIENT: ICD-10-CM

## 2022-02-21 DIAGNOSIS — E03.9 ACQUIRED HYPOTHYROIDISM: ICD-10-CM

## 2022-02-21 DIAGNOSIS — T45.1X5D ADVERSE EFFECT OF CHEMOTHERAPY, SUBSEQUENT ENCOUNTER: ICD-10-CM

## 2022-02-21 DIAGNOSIS — Z51.11 CHEMOTHERAPY MANAGEMENT, ENCOUNTER FOR: ICD-10-CM

## 2022-02-21 DIAGNOSIS — Z71.89 CARE PLAN DISCUSSED WITH PATIENT: ICD-10-CM

## 2022-02-21 DIAGNOSIS — D64.9 NORMOCYTIC ANEMIA: ICD-10-CM

## 2022-02-21 DIAGNOSIS — C34.92 STAGE IV SQUAMOUS CELL CARCINOMA OF LEFT LUNG (HCC): ICD-10-CM

## 2022-02-21 DIAGNOSIS — C34.92 SQUAMOUS CELL CARCINOMA OF LEFT LUNG (HCC): Primary | ICD-10-CM

## 2022-02-21 DIAGNOSIS — R49.0 HOARSENESS: ICD-10-CM

## 2022-02-21 DIAGNOSIS — E03.9 ACQUIRED HYPOTHYROIDISM: Primary | ICD-10-CM

## 2022-02-21 LAB
ALBUMIN SERPL-MCNC: 3.6 G/DL (ref 3.5–5.2)
ALP BLD-CCNC: 105 U/L (ref 40–130)
ALT SERPL-CCNC: 20 U/L (ref 21–72)
ANION GAP SERPL CALCULATED.3IONS-SCNC: 10 MMOL/L (ref 7–19)
AST SERPL-CCNC: 30 U/L (ref 17–59)
BASOPHILS ABSOLUTE: 0.02 K/UL (ref 0.01–0.08)
BASOPHILS RELATIVE PERCENT: 0.3 % (ref 0.1–1.2)
BILIRUB SERPL-MCNC: 0.3 MG/DL (ref 0.2–1.3)
BUN BLDV-MCNC: 17 MG/DL (ref 9–20)
CALCIUM SERPL-MCNC: 9 MG/DL (ref 8.4–10.2)
CHLORIDE BLD-SCNC: 106 MMOL/L (ref 98–111)
CO2: 25 MMOL/L (ref 22–29)
CREAT SERPL-MCNC: 0.6 MG/DL (ref 0.6–1.2)
EOSINOPHILS ABSOLUTE: 0 K/UL (ref 0.04–0.54)
EOSINOPHILS RELATIVE PERCENT: 0 % (ref 0.7–7)
GFR NON-AFRICAN AMERICAN: >60
GLOBULIN: 2.3 G/DL
GLUCOSE BLD-MCNC: 119 MG/DL (ref 74–106)
HCT VFR BLD CALC: 30.1 % (ref 40.1–51)
HEMOGLOBIN: 10.5 G/DL (ref 13.7–17.5)
LYMPHOCYTES ABSOLUTE: 1.33 K/UL (ref 1.18–3.74)
LYMPHOCYTES RELATIVE PERCENT: 16.7 % (ref 19.3–53.1)
MCH RBC QN AUTO: 33.9 PG (ref 25.7–32.2)
MCHC RBC AUTO-ENTMCNC: 34.9 G/DL (ref 32.3–36.5)
MCV RBC AUTO: 97.1 FL (ref 79–92.2)
MONOCYTES ABSOLUTE: 0.22 K/UL (ref 0.24–0.82)
MONOCYTES RELATIVE PERCENT: 2.8 % (ref 4.7–12.5)
NEUTROPHILS ABSOLUTE: 6.38 K/UL (ref 1.56–6.13)
NEUTROPHILS RELATIVE PERCENT: 80.2 % (ref 34–71.1)
PDW BLD-RTO: 17.2 % (ref 11.6–14.4)
PLATELET # BLD: 110 K/UL (ref 163–337)
PMV BLD AUTO: 8.8 FL (ref 7.4–10.4)
POTASSIUM SERPL-SCNC: 4.2 MMOL/L (ref 3.5–5.1)
RBC # BLD: 3.1 M/UL (ref 4.63–6.08)
SODIUM BLD-SCNC: 141 MMOL/L (ref 137–145)
TOTAL PROTEIN: 5.9 G/DL (ref 6.3–8.2)
TSH SERPL DL<=0.05 MIU/L-ACNC: 0.71 UIU/ML (ref 0.27–4.2)
WBC # BLD: 7.95 K/UL (ref 4.23–9.07)

## 2022-02-21 PROCEDURE — 99214 OFFICE O/P EST MOD 30 MIN: CPT | Performed by: INTERNAL MEDICINE

## 2022-02-21 PROCEDURE — 85025 COMPLETE CBC W/AUTO DIFF WBC: CPT

## 2022-02-21 PROCEDURE — 6360000002 HC RX W HCPCS: Performed by: INTERNAL MEDICINE

## 2022-02-21 PROCEDURE — 96415 CHEMO IV INFUSION ADDL HR: CPT

## 2022-02-21 PROCEDURE — 96417 CHEMO IV INFUS EACH ADDL SEQ: CPT

## 2022-02-21 PROCEDURE — 2580000003 HC RX 258: Performed by: INTERNAL MEDICINE

## 2022-02-21 PROCEDURE — 36415 COLL VENOUS BLD VENIPUNCTURE: CPT | Performed by: INTERNAL MEDICINE

## 2022-02-21 PROCEDURE — 96375 TX/PRO/DX INJ NEW DRUG ADDON: CPT

## 2022-02-21 PROCEDURE — 80053 COMPREHEN METABOLIC PANEL: CPT

## 2022-02-21 PROCEDURE — 2500000003 HC RX 250 WO HCPCS: Performed by: INTERNAL MEDICINE

## 2022-02-21 PROCEDURE — 96413 CHEMO IV INFUSION 1 HR: CPT

## 2022-02-21 RX ORDER — SODIUM CHLORIDE 9 MG/ML
25 INJECTION, SOLUTION INTRAVENOUS PRN
Status: CANCELLED | OUTPATIENT
Start: 2022-02-21

## 2022-02-21 RX ORDER — PALONOSETRON 0.05 MG/ML
0.25 INJECTION, SOLUTION INTRAVENOUS ONCE
Status: CANCELLED | OUTPATIENT
Start: 2022-02-21

## 2022-02-21 RX ORDER — HEPARIN SODIUM (PORCINE) LOCK FLUSH IV SOLN 100 UNIT/ML 100 UNIT/ML
500 SOLUTION INTRAVENOUS PRN
Status: CANCELLED | OUTPATIENT
Start: 2022-02-21

## 2022-02-21 RX ORDER — DIPHENHYDRAMINE HYDROCHLORIDE 50 MG/ML
25 INJECTION INTRAMUSCULAR; INTRAVENOUS ONCE
Status: CANCELLED | OUTPATIENT
Start: 2022-02-21

## 2022-02-21 RX ORDER — ALBUTEROL SULFATE 90 UG/1
4 AEROSOL, METERED RESPIRATORY (INHALATION) PRN
Status: CANCELLED | OUTPATIENT
Start: 2022-02-21

## 2022-02-21 RX ORDER — ACETAMINOPHEN 325 MG/1
650 TABLET ORAL
Status: CANCELLED | OUTPATIENT
Start: 2022-02-21

## 2022-02-21 RX ORDER — SODIUM CHLORIDE 0.9 % (FLUSH) 0.9 %
5-40 SYRINGE (ML) INJECTION PRN
Status: CANCELLED | OUTPATIENT
Start: 2022-02-21

## 2022-02-21 RX ORDER — MEPERIDINE HYDROCHLORIDE 50 MG/ML
12.5 INJECTION INTRAMUSCULAR; INTRAVENOUS; SUBCUTANEOUS PRN
Status: CANCELLED | OUTPATIENT
Start: 2022-02-21

## 2022-02-21 RX ORDER — PALONOSETRON 0.05 MG/ML
0.25 INJECTION, SOLUTION INTRAVENOUS ONCE
Status: COMPLETED | OUTPATIENT
Start: 2022-02-21 | End: 2022-02-21

## 2022-02-21 RX ORDER — SODIUM CHLORIDE 9 MG/ML
INJECTION, SOLUTION INTRAVENOUS CONTINUOUS
Status: CANCELLED | OUTPATIENT
Start: 2022-02-21

## 2022-02-21 RX ORDER — DEXAMETHASONE SODIUM PHOSPHATE 10 MG/ML
10 INJECTION, SOLUTION INTRAMUSCULAR; INTRAVENOUS ONCE
Status: COMPLETED | OUTPATIENT
Start: 2022-02-21 | End: 2022-02-21

## 2022-02-21 RX ORDER — SODIUM CHLORIDE 9 MG/ML
20 INJECTION, SOLUTION INTRAVENOUS ONCE
Status: COMPLETED | OUTPATIENT
Start: 2022-02-21 | End: 2022-02-22

## 2022-02-21 RX ORDER — SODIUM CHLORIDE 9 MG/ML
20 INJECTION, SOLUTION INTRAVENOUS ONCE
Status: CANCELLED | OUTPATIENT
Start: 2022-02-21 | End: 2022-02-21

## 2022-02-21 RX ORDER — ONDANSETRON 2 MG/ML
8 INJECTION INTRAMUSCULAR; INTRAVENOUS
Status: CANCELLED | OUTPATIENT
Start: 2022-02-21

## 2022-02-21 RX ORDER — DIPHENHYDRAMINE HYDROCHLORIDE 50 MG/ML
50 INJECTION INTRAMUSCULAR; INTRAVENOUS
Status: CANCELLED | OUTPATIENT
Start: 2022-02-21

## 2022-02-21 RX ORDER — SODIUM CHLORIDE 9 MG/ML
5-40 INJECTION INTRAVENOUS PRN
Status: CANCELLED | OUTPATIENT
Start: 2022-02-21

## 2022-02-21 RX ORDER — EPINEPHRINE 1 MG/ML
0.3 INJECTION, SOLUTION, CONCENTRATE INTRAVENOUS PRN
Status: CANCELLED | OUTPATIENT
Start: 2022-02-21

## 2022-02-21 RX ORDER — DIPHENHYDRAMINE HYDROCHLORIDE 50 MG/ML
25 INJECTION INTRAMUSCULAR; INTRAVENOUS ONCE
Status: COMPLETED | OUTPATIENT
Start: 2022-02-21 | End: 2022-02-21

## 2022-02-21 RX ADMIN — PACLITAXEL 300 MG: 6 INJECTION, SOLUTION, CONCENTRATE INTRAVENOUS at 13:08

## 2022-02-21 RX ADMIN — SODIUM CHLORIDE 20 ML/HR: 9 INJECTION, SOLUTION INTRAVENOUS at 12:23

## 2022-02-21 RX ADMIN — CARBOPLATIN 600 MG: 10 INJECTION, SOLUTION INTRAVENOUS at 16:17

## 2022-02-21 RX ADMIN — SODIUM CHLORIDE 200 MG: 9 INJECTION, SOLUTION INTRAVENOUS at 12:36

## 2022-02-21 RX ADMIN — FAMOTIDINE 20 MG: 10 INJECTION, SOLUTION INTRAVENOUS at 12:24

## 2022-02-21 RX ADMIN — PALONOSETRON 0.25 MG: 0.05 INJECTION, SOLUTION INTRAVENOUS at 12:24

## 2022-02-21 RX ADMIN — DIPHENHYDRAMINE HYDROCHLORIDE 25 MG: 50 INJECTION INTRAMUSCULAR; INTRAVENOUS at 12:24

## 2022-02-21 RX ADMIN — DEXAMETHASONE SODIUM PHOSPHATE 10 MG: 10 INJECTION INTRAMUSCULAR; INTRAVENOUS at 12:24

## 2022-02-23 NOTE — PROGRESS NOTES
Speech Language Pathology 30 Day Progress Note    Patient: Chucky Jamison                                                                                     Visit Date: 2022  :     1945    Referring practitioner:    Blair Walsh MD  Date of Initial Visit:          Type: THERAPY  Noted: 2022    Patient seen for 4 sessions    Visit Diagnoses:    ICD-10-CM ICD-9-CM   1. Oropharyngeal dysphagia  R13.12 787.22     SUBJECTIVE     Patient was accompanied by his wife. He was not feeling well due to chemo this week. He stated he was well enough to continue with therapy today.     OBJECTIVE   GOALS  Goals                                            STG  Comments Status   Patient will improve oral skills to enhance safety and increase eating efficiency and bolus control as measured by improved posterior propulsion of the bolus. Patient able to demonstrate tongue push exercises with minimal cues.   Progressing.    Patient will improve epiglottic inversion, increase base of the tongue strength and posterior pharyngeal wall excursion and increase efficiency of cough to clear residue from the airway as measured by decreased overt signs and symptoms of aspiration and decreased penetration and aspiration on repeat instrumental swallow study, if applicable.   Patient able to demonstrate effortful swallow x15 supersupraglotic swallow x 15 with moderate cues. He stated that he had difficulty with performing the mendelson. He feels his swallow is improving, he is working on taking his pills without crushing them, follows with soft food.  Progressing   Patient will report decreased difficulty with swallowing and improved EAT-10 score.  Previous: Eat 10 score 28 Ongoing           LTG        Patient will safely consume full PO diet of regular consistency food and thin liquids without difficulty or complications such as aspiration or pneumonia.  Goals  and exercises reviewed today. Patient able to demonstrate with minimal cues. No improvement in vocal closure noted. Patient not able to achieve functional phonation or closure during cough.  Ongoing         Therapy Education/Self Care    Details: POC   Given Home Exercise Program Access Code [email or web address]   Progress: Reinforced   Education provided to:  Patient   Level of understanding Verbalized             Total Time of Visit:             40  mins         ASSESSMENT/PLAN     ASSESSMENT: Patient was able to demonstrate swallowing exercises with minimal cues.     PLAN: Continue therapy and home exercises. Return in 2 weeks prior to ENT follow up. Patient may benefit from vocal fold injection or other ENT intervention as he is not yet able to improve laryngeal closure with swallow exercises.     Progress Note Due Date:3/23/22  Recertification Due Date:4/23/22    SIGNATURE: Bonita Cohen CCC-SLP, License #: [id number]  Electronically Signed on 2/23/2022          [address] [place] [address]. [id number]  [phone]

## 2022-03-09 NOTE — TELEPHONE ENCOUNTER
SLP sent CC'd chart to Dr. Walsh via in-basket regarding concerns from today's speech therapy visit.

## 2022-03-09 NOTE — PROGRESS NOTES
"                                                                  Speech Language Pathology 30 Day Progress Note    Patient: Chucky Jamison                                                                                     Visit Date: 3/9/2022  :     1945    Referring practitioner:    Blair Walsh MD  Date of Initial Visit:          Type: THERAPY  Noted: 2022    Patient seen for 5 sessions    Visit Diagnoses:    ICD-10-CM ICD-9-CM   1. Oropharyngeal dysphagia  R13.12 787.22     SUBJECTIVE     Patient was accompanied by his wife. He stated that he was not doing well. He spent time outside on Saturday this past weekend and has been \"going downhill\" since then. He feels that he has been doing worse since his last chemo treatment. He stated that he \"can't swallow\" and described esophageal symptoms of feeling that food is stuck and won't go down. He describes feeling a \"string\" across his throat. He has returned to crushing his pills and taking pills in pudding to help them go down.     OBJECTIVE   GOALS  Goals                                            STG  Comments Status   Patient will improve oral skills to enhance safety and increase eating efficiency and bolus control as measured by improved posterior propulsion of the bolus. Patient did not feel that he had the energy today to do his exercises. He has not been doing them at home this week due to fatigue not feeling well.    Patient was given education on eating softer, high nutritional value foods and avoid snacks like potato chips that are harder to manage.     Previous: Patient able to demonstrate tongue push exercises with minimal cues.   Decline    Patient will improve epiglottic inversion, increase base of the tongue strength and posterior pharyngeal wall excursion and increase efficiency of cough to clear residue from the airway as measured by decreased overt signs and symptoms of aspiration and decreased penetration and aspiration on " "repeat instrumental swallow study, if applicable.   Patient did not feel that he had the energy today to do his exercises. He has not been doing them at home this week due to fatigue not feeling well. He described tightness in his throat feeling like \"a string\". Patient was given cervical stretching exercises and encouraged to do light stretching daily.     Previous: Patient able to demonstrate effortful swallow x15 supersupraglotic swallow x 15 with moderate cues. He stated that he had difficulty with performing the mendelson. He feels his swallow is improving, he is working on taking his pills without crushing them, follows with soft food.  Decline   Patient will report decreased difficulty with swallowing and improved EAT-10 score.  Patient reports that he felt he was doing better until last week and now feels that he is getting worse. Previous: Eat 10 score on initial evaluation was 28, repeat EAT 10 today was 29, slightly worse than at initial evaluation.  Decline           LTG        Patient will safely consume full PO diet of regular consistency food and thin liquids without difficulty or complications such as aspiration or pneumonia.  Patient feeling like his swallowing has gotten worse over the past few weeks and \"going downhill\" since Saturday. He is not able to achieve glottal closure. He has returned to crushing his pills and taking pills with pudding. He is not wanting to eat due to difficulty.  Decline         Therapy Education/Self Care    Details: POC   Given Light cervical stretching exercises   Progress: New and reinforced   Education provided to:  Patient and wife   Level of understanding Verbalized and demonstrated             Total Time of Visit:             40  mins         ASSESSMENT/PLAN     ASSESSMENT: Patient having increased difficulty with swallowing and unable to achieve glottal closure for swallowing or phonation. Feeling of tightness in his throat with some mild pain at times. He " reports esophageal symptoms. He is not wanting to eat now due to increased difficulty.     PLAN: Follow up with Dr. Walsh tomorrow. SLP to contact Dr. Walsh with concerns. He may benefit from repeat swallow study. He may benefit from ENT intervention for vocal fold closure, to be discussed with Dr. Walsh. Patient/wife is to call and schedule follow up if needed, after repeat swallow study if recommended by ENT.     Progress Note Due Date:3/23/22  Recertification Due Date:4/23/22    SIGNATURE: Bonita Cohen, CCC-SLP, License #: [id number]  Electronically Signed on 3/9/2022          [address] [place] [address] [place] [address]  [phone]

## 2022-03-09 NOTE — PROGRESS NOTES
YOB: 1945  Location: [facility]  Location Address: [address] [place] [address], [facility], [address] [place] [address]  Location Phone: [phone]    Chief Complaint   Patient presents with   • Follow-up     Difficult swallowing, coughing and throat clearing        History of Present Illness  Chucky Jamison is a 76 y.o. male.  Chucky Jamison is here for follow up of ENT complaints. The patient is status post Excisional biopsy of right posterior inferior cervical neck mass and Right thyroidectomy and isthmusectomy with excision of right neck mass on 21. Patient continues to have hoarseness and dysphagia. He is also having fatigue and night sweats. He started The patient was started on Keytruda and carbotaxol per Dr. Trujillo.  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      Patient is having difficulty swallowing. His wife states this had gotten better, but the past week it has been significantly worse. He is having to put his capsules in applesauce and has difficulty with certain foods. He does well with liquids. And is drinking boost twice per day.  He is also having hoarseness, dry mouth.    He has lost around 40 pounds     Progress Notes by Bonita Cohen, CCC-SLP (2022 10:15)      Tissue Pathology Exam (2021 09:34)    TSH (2022 14:32 EST)      Study Result    Narrative & Impression   FL VIDEO SWALLOW W SPEECH SINGLE-CONTRAST- 2022 9:51 AM CST     REASON FOR EXAM: dysphagia; D61-Pivgxpcdm neoplasm of thyroid gland;  C34.12-Malignant neoplasm of upper lobe, left bronchus or lung;  J38.01-Paralysis of vocal cords and larynx, unilateral;  R13.14-Dysphagia, pharyngoesophageal phase     COMPARISON: NONE       FLUOROSCOPY TIME: 1.17 minutes     FLUOROSCOPY DOSE: 3.63 mGy     NUMBER OF IMAGES: 1     TECHNIQUE: In conjunction with speech pathology services, video  fluoroscopic swallow examination was performed with oral ingestion of  barium containing substances of various viscosities.      IMPRESSION:  FINDING/IMPRESSION: No evidence of aspiration or penetration. Large  volume residual is noted. Please see speech pathologist's report for  further details and recommendations.         This report was finalized on 01/06/2022 10:31 by Dr. Lj Avila MD.          Past Medical History:   Diagnosis Date   • Cataract    • COPD (chronic obstructive pulmonary disease) (HCC)    • Elevated cholesterol    • Emphysema lung (HCC)    • GERD (gastroesophageal reflux disease)    • Heart disease    • Hypercholesteremia    • Hypertension    • Lung cancer (HCC)    • Mass of right side of neck    • Mass of soft tissue of neck    • Prostate cancer (HCC)        Past Surgical History:   Procedure Laterality Date   • CATARACT EXTRACTION WITH INTRAOCULAR LENS IMPLANT Bilateral    • CORONARY STENT PLACEMENT     • EXCISION MASS HEAD/NECK Right 11/17/2021    Procedure: Excisional biopsy of right posterior neck mass and excisional biopsy of right anterior neck mass (related to the inferior right thyroid lobe);  Surgeon: Blair Walsh MD;  Location: [facility];  Service: ENT;  Laterality: Right;   • HERNIA REPAIR     • LUNG BIOPSY     • PROSTATE SURGERY         Outpatient Medications Marked as Taking for the 3/10/22 encounter (Office Visit) with Blair Walsh MD   Medication Sig Dispense Refill   • Aspirin 81 MG capsule aspirin     • azelastine (ASTELIN) 0.1 % nasal spray 1 spray into each nostril.     • BREO ELLIPTA 200-25 MCG/INH aerosol powder  INHALE 1 PUFF DAILY  1   • Cholecalciferol (VITAMIN D3) 2000 UNITS capsule Take 2 tablets by mouth Daily.     • cilostazol (PLETAL) 100 MG tablet Take 100 mg by mouth 2 (Two) Times a Day.  HOLD for DOS-11/17/21  1   • Cyanocobalamin (VITAMIN B12) 1000 MCG tablet controlled-release Take 1,000 mcg by mouth Daily.     • ezetimibe (ZETIA) 10 MG tablet Take 10 mg by mouth Daily.     • folic acid (FOLVITE) 1 MG tablet Take 1 mg by mouth Daily.     • gabapentin (NEURONTIN) 100 MG capsule Take 100 mg by mouth 2 (Two) Times a Day.     • LORazepam (ATIVAN) 0.5 MG tablet lorazepam 0.5 mg tablet   TAKE 1 TABLET BY MOUTH EVERY 8 HOURS AS NEEDED FOR ANXIETY FOR UP TO 30 DAYS.     • metaxalone (SKELAXIN) 800 MG tablet Take 1 tablet by mouth 2 (Two) Times a Day As Needed for muscle spasms. 60 tablet 0   • metoprolol tartrate (LOPRESSOR) 25 MG tablet TAKE 1 TABLET BY MOUTH TWICE A DAY  3   • montelukast (SINGULAIR) 10 MG tablet Take 10 mg by mouth Every Night.     • multivitamin with minerals tablet tablet Take 1 tablet by mouth Daily.     • nitroglycerin (NITROSTAT) 0.4 MG SL tablet PLACE 1 TAB UNDER TONGUE EVERY 5 MIN FOR UP TO 3 DOSES FOR CHEST PAIN **SEEK ER IF NO RELIEF**  5   • omeprazole (priLOSEC) 20 MG capsule Take 20 mg by mouth 2 (Two) Times a Day.     • rosuvastatin (CRESTOR) 20 MG tablet Take 20 mg by mouth Daily.     • traMADol (ULTRAM) 50 MG tablet Take 1 tablet by mouth 3 (Three) Times a Day As Needed for moderate pain (4-6). 90 tablet 0       Azithromycin, Penicillins, and Shellfish-derived products    Family History   Problem Relation Age of Onset   • Heart disease Mother    • No Known Problems Father    • Cancer Brother    • Prostate cancer Brother        Social History     Socioeconomic History   • Marital status:    Tobacco Use   • Smoking status: Current Every Day Smoker     Packs/day: 0.50     Types: Cigarettes   • Smokeless tobacco: Never Used   • Tobacco comment: Trying to cut back   Vaping Use   • Vaping Use: Never used   Substance and Sexual Activity   • Alcohol use: No   • Drug use: No   • Sexual activity: Defer       Review of Systems   Constitutional: Positive for fatigue.   HENT:  Positive for trouble swallowing and voice change.    Eyes: Negative.    Respiratory: Positive for cough and shortness of breath.    Cardiovascular: Negative.    Gastrointestinal: Negative.    Endocrine: Negative.    Genitourinary: Negative.    Musculoskeletal: Negative.    Neurological: Negative.        Vitals:    03/10/22 0929   BP: 124/68   Pulse: 97   Temp: 98 °F (36.7 °C)       Body mass index is 21.77 kg/m².    Objective     Physical Exam  Vitals reviewed.   Constitutional:       Appearance: He is normal weight.   HENT:      Head: Normocephalic.      Right Ear: Hearing, tympanic membrane, ear canal and external ear normal.      Left Ear: Hearing, tympanic membrane, ear canal and external ear normal.      Nose: Nose normal.      Mouth/Throat:      Lips: Pink.      Mouth: Mucous membranes are dry.      Pharynx: Uvula midline.      Comments: See endoscopy  Neck:     Musculoskeletal:      Cervical back: Full passive range of motion without pain and normal range of motion.   Neurological:      Mental Status: He is alert.         Assessment/Plan   Diagnoses and all orders for this visit:    1. Malignant neoplasm of upper lobe of left lung (HCC) (Primary)    2. Unilateral complete paralysis of vocal cord    3. 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 Thyroid carcinoma (HCC)    5. Mass of soft tissue of neck    6. Mass of right side of neck    7. Malignant neoplasm of hilus of left lung (HCC)    8. Pharyngoesophageal dysphagia      * Surgery not found *  No orders of the defined types were placed in this encounter.    Return in about 2 months (around 5/10/2022) for Recheck.       Patient Instructions   Be aware of the signs and symptoms of recurrent head and neck cancer including neck mass, persistent or recurrent sore throat, ear pain, hemoptysis, weight loss and hoarseness. If any of these symptoms recur and or persist, call for an evaluation.      D/W patient increasing caloric intake and discussed  cruciferous vegetables and protein shakes etc to maintain optimal nutrition.  The necessity of abstaining from tobacco products was also discussed particularly with respect to not smoking CONTACT INFORMATION:  The main office phone number is [phone]. For emergencies after hours and on weekends, this number will convert over to our answering service and the on call provider will answer. Please try to keep non emergent phone calls/ questions to office hours 9am-5pm Monday through Friday.      [employer]  As an alternative, you can sign up and use the Epic MyChart system for more direct and quicker access for non emergent questions/ problems.  [email or web address] allows you to send messages to your doctor, view your test results, renew your prescriptions, schedule appointments, and more. To sign up, go to [employer] and click on the Sign Up Now link in the New User? box. Enter your [employer] Activation Code exactly as it appears below along with the last four digits of your Social Security Number and your Date of Birth () to complete the sign-up process. If you do not sign up before the expiration date, you must request a new code.     [employer] Activation Code: Activation code not generated  Current [employer] Status: Active     If you have questions, you can email [email or web address] or call [phone] to talk to our [employer] staff. Remember, [employer] is NOT to be used for urgent needs. For medical emergencies, dial 911.     IF YOU SMOKE OR USE TOBACCO PLEASE READ THE FOLLOWING:  Why is smoking bad for me?  Smoking increases the risk of heart disease, lung disease, vascular disease, stroke, and cancer. If you smoke, STOP!        IF YOU SMOKE OR USE TOBACCO PLEASE READ THE FOLLOWING:  Why is smoking bad for me?  Smoking increases the risk of heart disease, lung disease, vascular disease, stroke, and cancer. If you smoke, STOP!     For more information:  Quit Now  [place]  1-800-QUIT-NOW  https://[email or web address].quitlogix.org/en-US/

## 2022-03-10 NOTE — PATIENT INSTRUCTIONS
Be aware of the signs and symptoms of recurrent head and neck cancer including neck mass, persistent or recurrent sore throat, ear pain, hemoptysis, weight loss and hoarseness. If any of these symptoms recur and or persist, call for an evaluation.      D/W patient increasing caloric intake and discussed cruciferous vegetables and protein shakes etc to maintain optimal nutrition.  The necessity of abstaining from tobacco products was also discussed particularly with respect to not smoking CONTACT INFORMATION:  The main office phone number is [phone]. For emergencies after hours and on weekends, this number will convert over to our answering service and the on call provider will answer. Please try to keep non emergent phone calls/ questions to office hours 9am-5pm Monday through Friday.      [employer]  As an alternative, you can sign up and use the Epic MyChart system for more direct and quicker access for non emergent questions/ problems.  [employer] allows you to send messages to your doctor, view your test results, renew your prescriptions, schedule appointments, and more. To sign up, go to [employer] and click on the Sign Up Now link in the New User? box. Enter your [employer] Activation Code exactly as it appears below along with the last four digits of your Social Security Number and your Date of Birth () to complete the sign-up process. If you do not sign up before the expiration date, you must request a new code.     [employer] Activation Code: Activation code not generated  Current [employer] Status: Active     If you have questions, you can email [email or web address] or call [phone] to talk to our [employer] staff. Remember, [employer] is NOT to be used for urgent needs. For medical emergencies, dial 911.     IF YOU SMOKE OR USE TOBACCO PLEASE READ THE FOLLOWING:  Why is smoking bad for me?  Smoking increases the risk of heart disease, lung disease, vascular disease, stroke,  and cancer. If you smoke, STOP!        IF YOU SMOKE OR USE TOBACCO PLEASE READ THE FOLLOWING:  Why is smoking bad for me?  Smoking increases the risk of heart disease, lung disease, vascular disease, stroke, and cancer. If you smoke, STOP!     For more information:  Quit Now [place]  1-800-QUIT-NOW  https://[email or web address].[email or web address]/en-US/

## 2022-03-10 NOTE — PROGRESS NOTES
OPERATIVE NOTE:  Chucky Jamison    DATE OF PROCEDURE: 3/10/2022    PROCEDURE:   Flexible Fiberoptic Laryngoscopy    ANESTHESIA:  None    REASON FOR PROCEDURE:  Procedure was recommended for persistant hoarseness, re-evaluation of a lesion previously documented and moderately progressive difficulty swallowing in the last 2 weeks  Risks, benefits and alternatives were discussed.      DETAILS of OPERATION:  The patient was seated in the exam chair.  A flexible fiberoptic laryngoscopy was performed through the oral cavity.  The scope was introduced into the oral cavity and directed to the level of the glottis, examining the structures of the oropharynx, base of tongue, vallecula, supraglottic larynx, glottic larynx, and hypopharynx.      FINDINGS:  Mucosal surfaces:   The mucosal surfaces demonstrated normal mucosa surfaces with mild inflammation    Base of tongue:  The base of tongue was found to have no mass or lesion.    Epiglottis:  The epiglottis was found to have no mass or lesion.    Aryepiglottic fold:  The AE folds were found to have no mass or lesion.    False Vocal Fold:  The false cords were found to have no mass or lesion.    True Vocal Cord:  The true vocal cords were found to have no mass or lesion.  Left true vocal cord adducts and abducts normally.  The right true vocal cord is fixed in a paramedian position  Arytenoid:   The arytenoids were found to have no mass or lesion.    Hypopharynx:  The hypopharynx was found to have no mass or lesion.    The patient tolerated procedure well.

## 2022-03-14 ENCOUNTER — HOSPITAL ENCOUNTER (OUTPATIENT)
Dept: INFUSION THERAPY | Age: 77
Discharge: HOME | End: 2022-03-14
Payer: OTHER GOVERNMENT

## 2022-03-14 VITALS
RESPIRATION RATE: 22 BRPM | BODY MASS INDEX: 21.61 KG/M2 | OXYGEN SATURATION: 99 % | DIASTOLIC BLOOD PRESSURE: 72 MMHG | HEART RATE: 104 BPM | WEIGHT: 137.7 LBS | HEIGHT: 67 IN | TEMPERATURE: 97.8 F | SYSTOLIC BLOOD PRESSURE: 103 MMHG

## 2022-03-14 DIAGNOSIS — C34.92 SQUAMOUS CELL CARCINOMA OF LEFT LUNG (HCC): Primary | ICD-10-CM

## 2022-03-14 LAB
ALBUMIN SERPL-MCNC: 4.1 G/DL (ref 3.5–5.2)
ALP BLD-CCNC: 78 U/L (ref 40–130)
ALT SERPL-CCNC: 29 U/L (ref 21–72)
ANION GAP SERPL CALCULATED.3IONS-SCNC: 8 MMOL/L (ref 7–19)
AST SERPL-CCNC: 35 U/L (ref 17–59)
BASOPHILS ABSOLUTE: 0.03 K/UL (ref 0.01–0.08)
BASOPHILS RELATIVE PERCENT: 0.5 % (ref 0.1–1.2)
BILIRUB SERPL-MCNC: 0.5 MG/DL (ref 0.2–1.3)
BUN BLDV-MCNC: 9 MG/DL (ref 9–20)
CALCIUM SERPL-MCNC: 9.2 MG/DL (ref 8.4–10.2)
CHLORIDE BLD-SCNC: 106 MMOL/L (ref 98–111)
CO2: 27 MMOL/L (ref 22–29)
CREAT SERPL-MCNC: 0.6 MG/DL (ref 0.6–1.2)
EOSINOPHILS ABSOLUTE: 0.03 K/UL (ref 0.04–0.54)
EOSINOPHILS RELATIVE PERCENT: 0.5 % (ref 0.7–7)
GFR NON-AFRICAN AMERICAN: >60
GLOBULIN: 3.1 G/DL
GLUCOSE BLD-MCNC: 102 MG/DL (ref 74–106)
HCT VFR BLD CALC: 29.4 % (ref 40.1–51)
HEMOGLOBIN: 10 G/DL (ref 13.7–17.5)
LYMPHOCYTES ABSOLUTE: 1.5 K/UL (ref 1.18–3.74)
LYMPHOCYTES RELATIVE PERCENT: 23.1 % (ref 19.3–53.1)
MCH RBC QN AUTO: 34.6 PG (ref 25.7–32.2)
MCHC RBC AUTO-ENTMCNC: 34 G/DL (ref 32.3–36.5)
MCV RBC AUTO: 101.7 FL (ref 79–92.2)
MONOCYTES ABSOLUTE: 0.81 K/UL (ref 0.24–0.82)
MONOCYTES RELATIVE PERCENT: 12.5 % (ref 4.7–12.5)
NEUTROPHILS ABSOLUTE: 4.11 K/UL (ref 1.56–6.13)
NEUTROPHILS RELATIVE PERCENT: 63.4 % (ref 34–71.1)
PDW BLD-RTO: 18 % (ref 11.6–14.4)
PLATELET # BLD: 111 K/UL (ref 163–337)
PMV BLD AUTO: 9.1 FL (ref 7.4–10.4)
POTASSIUM SERPL-SCNC: 4.1 MMOL/L (ref 3.5–5.1)
RBC # BLD: 2.89 M/UL (ref 4.63–6.08)
SODIUM BLD-SCNC: 141 MMOL/L (ref 137–145)
TOTAL PROTEIN: 7.3 G/DL (ref 6.3–8.2)
WBC # BLD: 6.48 K/UL (ref 4.23–9.07)

## 2022-03-14 PROCEDURE — 85025 COMPLETE CBC W/AUTO DIFF WBC: CPT

## 2022-03-14 PROCEDURE — 80053 COMPREHEN METABOLIC PANEL: CPT

## 2022-03-14 PROCEDURE — 6360000002 HC RX W HCPCS: Performed by: INTERNAL MEDICINE

## 2022-03-14 PROCEDURE — 36415 COLL VENOUS BLD VENIPUNCTURE: CPT

## 2022-03-14 PROCEDURE — 96413 CHEMO IV INFUSION 1 HR: CPT

## 2022-03-14 PROCEDURE — 2580000003 HC RX 258: Performed by: INTERNAL MEDICINE

## 2022-03-14 RX ORDER — ONDANSETRON 2 MG/ML
8 INJECTION INTRAMUSCULAR; INTRAVENOUS
Status: CANCELLED | OUTPATIENT
Start: 2022-03-14

## 2022-03-14 RX ORDER — SODIUM CHLORIDE 9 MG/ML
25 INJECTION, SOLUTION INTRAVENOUS PRN
Status: CANCELLED | OUTPATIENT
Start: 2022-03-14

## 2022-03-14 RX ORDER — SODIUM CHLORIDE 0.9 % (FLUSH) 0.9 %
5-40 SYRINGE (ML) INJECTION PRN
Status: CANCELLED | OUTPATIENT
Start: 2022-03-14

## 2022-03-14 RX ORDER — SODIUM CHLORIDE 9 MG/ML
INJECTION, SOLUTION INTRAVENOUS CONTINUOUS
Status: CANCELLED | OUTPATIENT
Start: 2022-03-14

## 2022-03-14 RX ORDER — SODIUM CHLORIDE 9 MG/ML
20 INJECTION, SOLUTION INTRAVENOUS ONCE
Status: CANCELLED | OUTPATIENT
Start: 2022-03-14 | End: 2022-03-14

## 2022-03-14 RX ORDER — ALBUTEROL SULFATE 90 UG/1
4 AEROSOL, METERED RESPIRATORY (INHALATION) PRN
Status: CANCELLED | OUTPATIENT
Start: 2022-03-14

## 2022-03-14 RX ORDER — DIPHENHYDRAMINE HYDROCHLORIDE 50 MG/ML
50 INJECTION INTRAMUSCULAR; INTRAVENOUS
Status: CANCELLED | OUTPATIENT
Start: 2022-03-14

## 2022-03-14 RX ORDER — SODIUM CHLORIDE 9 MG/ML
5-40 INJECTION INTRAVENOUS PRN
Status: CANCELLED | OUTPATIENT
Start: 2022-03-14

## 2022-03-14 RX ORDER — MEPERIDINE HYDROCHLORIDE 50 MG/ML
12.5 INJECTION INTRAMUSCULAR; INTRAVENOUS; SUBCUTANEOUS PRN
Status: CANCELLED | OUTPATIENT
Start: 2022-03-14

## 2022-03-14 RX ORDER — HEPARIN SODIUM (PORCINE) LOCK FLUSH IV SOLN 100 UNIT/ML 100 UNIT/ML
500 SOLUTION INTRAVENOUS PRN
Status: CANCELLED | OUTPATIENT
Start: 2022-03-14

## 2022-03-14 RX ORDER — ACETAMINOPHEN 325 MG/1
650 TABLET ORAL
Status: CANCELLED | OUTPATIENT
Start: 2022-03-14

## 2022-03-14 RX ORDER — EPINEPHRINE 1 MG/ML
0.3 INJECTION, SOLUTION, CONCENTRATE INTRAVENOUS PRN
Status: CANCELLED | OUTPATIENT
Start: 2022-03-14

## 2022-03-14 RX ADMIN — SODIUM CHLORIDE 200 MG: 9 INJECTION, SOLUTION INTRAVENOUS at 10:21

## 2022-03-16 ENCOUNTER — OFFICE VISIT (OUTPATIENT)
Dept: PULMONOLOGY | Age: 77
End: 2022-03-16
Payer: OTHER GOVERNMENT

## 2022-03-16 VITALS
WEIGHT: 140.2 LBS | OXYGEN SATURATION: 96 % | HEART RATE: 93 BPM | HEIGHT: 67 IN | DIASTOLIC BLOOD PRESSURE: 56 MMHG | BODY MASS INDEX: 22 KG/M2 | SYSTOLIC BLOOD PRESSURE: 100 MMHG

## 2022-03-16 DIAGNOSIS — R06.09 DYSPNEA ON EFFORT: ICD-10-CM

## 2022-03-16 DIAGNOSIS — C34.92 SQUAMOUS CELL CARCINOMA OF LEFT LUNG (HCC): Primary | ICD-10-CM

## 2022-03-16 DIAGNOSIS — R05.9 COUGH: ICD-10-CM

## 2022-03-16 DIAGNOSIS — R49.0 HOARSENESS: ICD-10-CM

## 2022-03-16 DIAGNOSIS — I10 ESSENTIAL HYPERTENSION: ICD-10-CM

## 2022-03-16 LAB
DISTANCE WALKED: 900 FT
SPO2: 91 %

## 2022-03-16 PROCEDURE — 94618 PULMONARY STRESS TESTING: CPT | Performed by: INTERNAL MEDICINE

## 2022-03-16 PROCEDURE — 99214 OFFICE O/P EST MOD 30 MIN: CPT | Performed by: INTERNAL MEDICINE

## 2022-03-16 ASSESSMENT — ENCOUNTER SYMPTOMS
WHEEZING: 0
APNEA: 0
RHINORRHEA: 0
COUGH: 0
ABDOMINAL PAIN: 0
CHEST TIGHTNESS: 0
VOICE CHANGE: 1
BACK PAIN: 0
ANAL BLEEDING: 0
ABDOMINAL DISTENTION: 0
SHORTNESS OF BREATH: 0

## 2022-03-16 NOTE — PROGRESS NOTES
Pulmonary and Sleep Medicine    Lydia Li (:  1945) is a 78 y.o. male,Established patient, here for evaluation of the following chief complaint(s):  Follow-up (Pt has some questions about his CT scan.)      Referring physician:  No referring provider defined for this encounter. ASSESSMENT/PLAN:  1. Squamous cell carcinoma of left lung ([address]  2. Hoarseness  3. Essential hypertension  4. Cough        Continue current management for squamous cell lung cancer. Follow-up imaging per oncology. He is scheduled to see Dr. Tj Neri next month. I will see him again in 3 months to assess response. A lot of the changes seen on the current CT are related to silicosis. Prosper Wilkins MD, [facility], [facility]    Return in about 3 months (around 2022). SUBJECTIVE/OBJECTIVE:  Patient is here for follow-up on stage IV squamous cell carcinoma. Is finished with chemotherapy. He continues to be on immunotherapy. CT of the chest was done last month. There is significant response to treatment. Prior to Visit Medications    Medication Sig Taking?  Authorizing Provider   predniSONE (DELTASONE) 50 MG tablet Take 1 tablet by mouth 24 hrs, 12 hrs and 1 hr prior to CT scan Yes Mil Osborne MD   diphenhydrAMINE (BENADRYL) 50 MG tablet Take 1 tablet 1 hour prior to CT scan Yes Mil Osborne MD   metoprolol tartrate (LOPRESSOR) 25 MG tablet TAKE 1 TABLET BY MOUTH TWICE A DAY Yes Donald Murillo, APRN - CNP   ondansetron (ZOFRAN) 4 MG tablet Take 1 tablet by mouth daily as needed for Nausea or Vomiting Yes Mil Osborne MD   dexamethasone (DECADRON) 4 MG tablet Take 5 tablets the night before and 5 tablets the morning of treatment every 3 weeks x 4 cycles Yes Mil Osborne MD   nitroGLYCERIN (NITROSTAT) 0.4 MG SL tablet Place 1 tablet under the tongue every 5 minutes as needed for Chest pain As directed Yes DIVYA Cummings - NP   ezetimibe (ZETIA) 10 MG tablet Take 10 mg by mouth daily Yes Historical Provider, MD   montelukast (SINGULAIR) 10 MG tablet Take 10 mg by mouth nightly Yes Historical Provider, MD   rosuvastatin (CRESTOR) 10 MG tablet Take 20 mg by mouth daily  Yes Historical Provider, MD   calcium citrate (CALCITRATE) 950 MG tablet Take 1 tablet by mouth daily Yes Historical Provider, MD   metaxalone (SKELAXIN) 800 MG tablet Take 800 mg by mouth Yes Historical Provider, MD   traMADol (ULTRAM) 50 MG tablet Take 50 mg by mouth. . Yes Historical Provider, MD   omeprazole (PRILOSEC) 20 MG delayed release capsule Take 20 mg by mouth daily Yes Historical Provider, MD   Multiple Minerals-Vitamins (CALCIUM & VIT D3 BONE HEALTH PO) Take by mouth Yes Historical Provider, MD   Cholecalciferol (VITAMIN D3) 2000 UNITS CAPS Take by mouth Yes Historical Provider, MD   vitamin B-12 (CYANOCOBALAMIN) 1000 MCG tablet Take 1,000 mcg by mouth daily Yes Historical Provider, MD   aspirin 81 MG tablet Take 81 mg by mouth 2 times daily Yes Historical Provider, MD   folic acid (FOLVITE) 1 MG tablet Take 1 mg by mouth daily Yes Historical Provider, MD   Fluticasone Furoate-Vilanterol (BREO ELLIPTA) 200-25 MCG/INH AEPB Inhale into the lungs Yes Historical Provider, MD   azelastine HCl 0.15 % SOLN by Nasal route Yes Historical Provider, MD   cilostazol (PLETAL) 100 MG tablet Take 100 mg by mouth 2 times daily Yes Historical Provider, MD   gabapentin (NEURONTIN) 100 MG capsule Take 1 capsule by mouth 2 times daily for 30 days. Lyly Charles MD        Review of Systems   Constitutional: Negative for activity change, appetite change, chills, diaphoresis and fatigue. HENT: Positive for voice change. Negative for congestion, dental problem, drooling, ear discharge, postnasal drip and rhinorrhea. Eyes: Negative for visual disturbance. Respiratory: Negative for apnea, cough, chest tightness, shortness of breath and wheezing.     Gastrointestinal: Negative for abdominal distention, abdominal pain and anal bleeding. Endocrine: Negative for cold intolerance, heat intolerance and polydipsia. Genitourinary: Negative for difficulty urinating, dysuria, enuresis and flank pain. Musculoskeletal: Negative for arthralgias, back pain and gait problem. Allergic/Immunologic: Negative for environmental allergies. Neurological: Negative for dizziness, facial asymmetry, light-headedness and headaches. Vitals:    03/16/22 0945   BP: (!) 100/56   Pulse: 93   SpO2: 96%     Physical Exam  Vitals reviewed. Constitutional:       Appearance: Normal appearance. HENT:      Head: Normocephalic and atraumatic. Nose: Nose normal.   Eyes:      Extraocular Movements: Extraocular movements intact. Conjunctiva/sclera: Conjunctivae normal.   Cardiovascular:      Rate and Rhythm: Normal rate and regular rhythm. Heart sounds: No murmur heard. No friction rub. Pulmonary:      Effort: Pulmonary effort is normal. No respiratory distress. Breath sounds: Normal breath sounds. No stridor. No wheezing, rhonchi or rales. Abdominal:      General: There is no distension. Palpations: There is no mass. Tenderness: There is no abdominal tenderness. There is no guarding or rebound. Musculoskeletal:      Cervical back: Normal range of motion and neck supple. Neurological:      Mental Status: He is alert and oriented to person, place, and time. This note was generated used a voice recognition software. Errors in voice recognition may have occurred. An electronic signature was used to authenticate this note.     --Heather Pittman MD

## 2022-03-17 ENCOUNTER — OFFICE VISIT (OUTPATIENT)
Dept: CARDIOLOGY CLINIC | Age: 77
End: 2022-03-17
Payer: MEDICARE

## 2022-03-17 VITALS
HEART RATE: 91 BPM | DIASTOLIC BLOOD PRESSURE: 60 MMHG | WEIGHT: 139 LBS | SYSTOLIC BLOOD PRESSURE: 98 MMHG | HEIGHT: 67 IN | BODY MASS INDEX: 21.82 KG/M2

## 2022-03-17 DIAGNOSIS — Z95.5 H/O HEART ARTERY STENT: ICD-10-CM

## 2022-03-17 DIAGNOSIS — R06.09 DYSPNEA ON EFFORT: ICD-10-CM

## 2022-03-17 DIAGNOSIS — I10 ESSENTIAL HYPERTENSION: ICD-10-CM

## 2022-03-17 DIAGNOSIS — I25.10 CORONARY ARTERY DISEASE INVOLVING NATIVE CORONARY ARTERY OF NATIVE HEART WITHOUT ANGINA PECTORIS: Primary | ICD-10-CM

## 2022-03-17 DIAGNOSIS — I71.40 ABDOMINAL AORTIC ANEURYSM (AAA) WITHOUT RUPTURE: ICD-10-CM

## 2022-03-17 DIAGNOSIS — E78.2 MIXED HYPERLIPIDEMIA: ICD-10-CM

## 2022-03-17 PROCEDURE — 99213 OFFICE O/P EST LOW 20 MIN: CPT | Performed by: INTERNAL MEDICINE

## 2022-03-17 ASSESSMENT — ENCOUNTER SYMPTOMS
EYES NEGATIVE: 1
DIARRHEA: 0
RESPIRATORY NEGATIVE: 1
GASTROINTESTINAL NEGATIVE: 1
NAUSEA: 0
SHORTNESS OF BREATH: 0
VOMITING: 0

## 2022-03-17 NOTE — PROGRESS NOTES
[facility] Progress Note                            Date:  3/17/2022  Patient: Tyler Osborn  Age:  78 y.o., 1945      Reason for evaluation:         SUBJECTIVE:    Returns today follow-up assessment for coronary artery disease hypertension previous stent hyperlipidemia. No recent lipid profile on file. Cancer apparently in remission denies anginal chest pain or progressive dyspnea with exertion fatigued all the time about the same. No other complaints or issues reported. Blood pressure 98/60 heart 91. Review of Systems   Constitutional: Negative. Negative for chills, fever and unexpected weight change. HENT: Negative. Eyes: Negative. Respiratory: Negative. Negative for shortness of breath. Cardiovascular: Negative. Negative for chest pain. Gastrointestinal: Negative. Negative for diarrhea, nausea and vomiting. Endocrine: Negative. Genitourinary: Negative. Musculoskeletal: Negative. Skin: Negative. Neurological: Negative. All other systems reviewed and are negative. OBJECTIVE:     BP 98/60   Pulse 91   Ht 5' 7\" (1.702 m)   Wt 139 lb (63 kg)   BMI 21.77 kg/m²     Labs:   CBC: No results for input(s): WBC, HGB, HCT, PLT in the last 72 hours. BMP:No results for input(s): NA, K, CO2, BUN, CREATININE, LABGLOM, GLUCOSE in the last 72 hours. BNP: No results for input(s): BNP in the last 72 hours. PT/INR: No results for input(s): PROTIME, INR in the last 72 hours. APTT:No results for input(s): APTT in the last 72 hours. CARDIAC ENZYMES:No results for input(s): CKTOTAL, CKMB, CKMBINDEX, TROPONINI in the last 72 hours. FASTING LIPID PANEL:No results found for: HDL, LDLDIRECT, LDLCALC, TRIG  LIVER PROFILE:No results for input(s): AST, ALT, LABALBU in the last 72 hours.         Past Medical History:   Diagnosis Date    CAD (coronary artery disease)     RUFINO to LCX 1/13    Hyperlipidemia     Lung cancer (HCC)     Nicotine dependence, cigarettes, w unsp disorders 12/1/2017    Pro[facility] ([facility]     PVD (peripheral vascular disease) ([facility]     Tobacco abuse      Past Surgical History:   Procedure Laterality Date    BRONCHOSCOPY Left 11/5/2021    BRONCHOSCOPY ENDOBRONCHIAL ULTRASOUND performed by Ramón Whiteside MD at [facility]   [address]  02/02/2013    EF 65%, patent stent to LCX, no new occlusive disease    CATARACT REMOVAL WITH IMPLANT Bilateral 2014    CORONARY ANGIOPLASTY  01/28/2013    RUFINO to LCX    HERNIA REPAIR      HX VASCULAR STENT  2013    LUNG BIOPSY  11/05/2021    PROSTATE SURGERY      SHOULDER SURGERY Left      Family History   Problem Relation Age of Onset    Hypertension Mother     Stroke Mother     Cancer Brother 61        Lung    Cancer Brother 79        Prostate     Allergies   Allergen Reactions    Zithromax [Azithromycin]      abd pain.      Penicillins Rash     And pain      Shellfish-Derived Products Rash     Current Outpatient Medications   Medication Sig Dispense Refill    predniSONE (DELTASONE) 50 MG tablet Take 1 tablet by mouth 24 hrs, 12 hrs and 1 hr prior to CT scan 3 tablet 0    diphenhydrAMINE (BENADRYL) 50 MG tablet Take 1 tablet 1 hour prior to CT scan 1 tablet 0    metoprolol tartrate (LOPRESSOR) 25 MG tablet TAKE 1 TABLET BY MOUTH TWICE A  tablet 0    ondansetron (ZOFRAN) 4 MG tablet Take 1 tablet by mouth daily as needed for Nausea or Vomiting 30 tablet 0    nitroGLYCERIN (NITROSTAT) 0.4 MG SL tablet Place 1 tablet under the tongue every 5 minutes as needed for Chest pain As directed 25 tablet 5    ezetimibe (ZETIA) 10 MG tablet Take 10 mg by mouth daily      montelukast (SINGULAIR) 10 MG tablet Take 10 mg by mouth nightly      rosuvastatin (CRESTOR) 10 MG tablet Take 20 mg by mouth daily       calcium citrate (CALCITRATE) 950 MG tablet Take 1 tablet by mouth daily      metaxalone (SKELAXIN) 800 MG tablet Take 800 mg by mouth      traMADol (ULTRAM) 50 MG tablet Take 50 mg by mouth. Tammie Garcia omeprazole (PRILOSEC) 20 MG delayed release capsule Take 20 mg by mouth daily      Multiple Minerals-Vitamins (CALCIUM & VIT D3 BONE HEALTH PO) Take by mouth      Cholecalciferol (VITAMIN D3) 2000 UNITS CAPS Take by mouth      vitamin B-12 (CYANOCOBALAMIN) 1000 MCG tablet Take 1,000 mcg by mouth daily      aspirin 81 MG tablet Take 81 mg by mouth 2 times daily      folic acid (FOLVITE) 1 MG tablet Take 1 mg by mouth daily      Fluticasone Furoate-Vilanterol (BREO ELLIPTA) 200-25 MCG/INH AEPB Inhale into the lungs      azelastine HCl 0.15 % SOLN by Nasal route      cilostazol (PLETAL) 100 MG tablet Take 100 mg by mouth 2 times daily      gabapentin (NEURONTIN) 100 MG capsule Take 1 capsule by mouth 2 times daily for 30 days. 60 capsule 0    dexamethasone (DECADRON) 4 MG tablet Take 5 tablets the night before and 5 tablets the morning of treatment every 3 weeks x 4 cycles (Patient not taking: Reported on 3/17/2022) 40 tablet 0     No current facility-administered medications for this visit.      Social History     Socioeconomic History    Marital status:      Spouse name: Not on file    Number of children: Not on file    Years of education: Not on file    Highest education level: Not on file   Occupational History    Not on file   Tobacco Use    Smoking status: Former Smoker     Packs/day: 0.25     Years: 60.00     Pack years: 15.00     Types: Cigarettes    Smokeless tobacco: Never Used   Vaping Use    Vaping Use: Never used   Substance and Sexual Activity    Alcohol use: No    Drug use: No    Sexual activity: Never   Other Topics Concern    Not on file   Social History Narrative    Not on file     Social Determinants of Health     Financial Resource Strain:     Difficulty of Paying Living Expenses: Not on file   Food Insecurity:     Worried About Running Out of Food in the Last Year: Not on file    Earlene of Food in the Last Year: Not on file Transportation Needs:     Lack of Transportation (Medical): Not on file    Lack of Transportation (Non-Medical): Not on file   Physical Activity:     Days of Exercise per Week: Not on file    Minutes of Exercise per Session: Not on file   Stress:     Feeling of Stress : Not on file   Social Connections:     Frequency of Communication with Friends and Family: Not on file    Frequency of Social Gatherings with Friends and Family: Not on file    Attends Christian Services: Not on file    Active Member of [address] [place] [address] or Organizations: Not on file    Attends Club or Organization Meetings: Not on file    Marital Status: Not on file   Intimate Partner Violence:     Fear of Current or Ex-Partner: Not on file    Emotionally Abused: Not on file    Physically Abused: Not on file    Sexually Abused: Not on file   Housing Stability:     Unable to Pay for Housing in the Last Year: Not on file    Number of [place] in the Last Year: Not on file    Unstable Housing in the Last Year: Not on file       Physical Examination:  BP 98/60   Pulse 91   Ht 5' 7\" (1.702 m)   Wt 139 lb (63 kg)   BMI 21.77 kg/m²   Physical Exam  Constitutional:       Appearance: He is well-developed. Neck:      Vascular: No carotid bruit or JVD. Cardiovascular:      Rate and Rhythm: Normal rate and regular rhythm. Heart sounds: Normal heart sounds. No murmur heard. No friction rub. No gallop. Pulmonary:      Effort: Pulmonary effort is normal. No respiratory distress. Breath sounds: Normal breath sounds. No wheezing or rales. Abdominal:      General: There is no distension. Tenderness: There is no abdominal tenderness. Lymphadenopathy:      Cervical: No cervical adenopathy. Skin:     General: Skin is warm and dry. ASSESSMENT:     Diagnosis Orders   1. Coronary artery disease involving native coronary artery of native heart without angina pectoris  Lipid Panel   2.  Essential hypertension  Lipid Panel   3. H/O heart artery stent  Lipid Panel   4. Mixed hyperlipidemia  Lipid Panel   5. Dyspnea on effort  Lipid Panel   6. Abdominal aortic aneurysm (AAA) without rupture ([address]  Lipid Panel       PLAN:  Orders Placed This Encounter   Procedures    Lipid Panel     No orders of the defined types were placed in this encounter. 1. Continue present medications  2. Recommend follow-up assessment in 6 months    Return in about 6 months (around 9/17/2022) for return to Dr. Elpidio Brewer only. Saturnino Phillips MD 3/17/2022 1:20 PM CDT    [facility]      Thisdictation was generated by voice recognition computer software. Although all attempts are made to edit the dictation for accuracy, there may be errors in the transcription that are not intended.

## 2022-03-17 NOTE — TELEPHONE ENCOUNTER
----- Message from KARLA Pascal sent at 3/17/2022  8:53 AM CDT -----  Yes see if he will do dysphagia therapy     ----- Message -----  From: Nuris Barba RN  Sent: 3/16/2022   3:18 PM CDT  To: KARLA Pascal    Patient calling for results. Need ST?     Patient is requesting something for vertigo.  She has had this in the past.  Pharmacy is set up.

## 2022-03-29 DIAGNOSIS — I25.10 CORONARY ARTERY DISEASE INVOLVING NATIVE CORONARY ARTERY OF NATIVE HEART WITHOUT ANGINA PECTORIS: ICD-10-CM

## 2022-03-29 DIAGNOSIS — E78.2 MIXED HYPERLIPIDEMIA: ICD-10-CM

## 2022-03-29 DIAGNOSIS — I10 ESSENTIAL HYPERTENSION: ICD-10-CM

## 2022-03-29 DIAGNOSIS — R06.09 DYSPNEA ON EFFORT: ICD-10-CM

## 2022-03-29 DIAGNOSIS — Z95.5 H/O HEART ARTERY STENT: ICD-10-CM

## 2022-03-29 DIAGNOSIS — I71.40 ABDOMINAL AORTIC ANEURYSM (AAA) WITHOUT RUPTURE: ICD-10-CM

## 2022-03-29 LAB
CHOLESTEROL, TOTAL: 111 MG/DL (ref 160–199)
HDLC SERPL-MCNC: 35 MG/DL (ref 55–121)
LDL CHOLESTEROL CALCULATED: 52 MG/DL
TRIGL SERPL-MCNC: 119 MG/DL (ref 0–149)

## 2022-03-31 NOTE — PROGRESS NOTES
MEDICAL ONCOLOGY PROGRESS NOTE    Pt Name: Geeta Fernandez  MRN: [id number]  YOB: 1945  Date of evaluation: 4/4/2022      HISTORY OF PRESENT ILLNESS:    Reason for MD visit-toxicity assessment/disease management  The patient is a pleasant 78years old male who presents today for consideration of maintenance pembrolizumab. Patient has been tolerating treatments complains of fatigue, muscle pain, joint pains. He also has alopecia. He had CT scans performed. Diagnosis  · Squamous cell carcinoma, Left lung, Nov 2021  · [id number], stage IV (left thyroid nodule, skin metastasis)  · 11/5/21-PD-L1 Expressed 35%    Treatment Summary  · 12/20/21- Initiated Carboplatin AUC 5 Paclitaxel 175mg/m2 and Keytruda    · 3/14/22 Initiated maintenance Pembrolizumab 200mg every 3 weeks    Cancer History  Asya Grandchild was first seen by me on 11/15/2021. He was referred by pulmonary at [facility] for a diagnosis of lung cancer. He developed complaints of cough. Chest x-ray was performed and showed a lung mass. Patient is a currently smoker. He lost about 25 pounds. · 9/28/21 CXR [facility]): Questionable mass density seen only on the lateral view with patchy density in left upper lobe. Further evaluation with computed tomography is recommended. COPD. · 10/4/21 CT chest [facility]): Mass lesion left hilum extending caudally along the mediastinum and inseparable from the mediastinum in this region. This is highly suspicious for malignancy. Consolidation and/or neoplastic mass left upper lobe as described above Emphysema. New right thyroid nodule, probably solid. 2 chronic cysts within the liver. · 10/22/21- PET scan [facility]): Essentially, mass in the right side of the neck of the neck base measuring 2.8 cm x 2.4 cm. SUV 18.9. Also small focus soft tissue of the back at the base of the neck on the right. Lesion measured 9 mm. SUV 10.5. Large mass in the left hilum measures 5.1 x 4.2 cm (SUV 18.6). Extensive parenchymal abnormality with a high SUV uptake in the left upper lobe. Apical component with a small amount of cavitation measuring 2.9 x 2.6 cm with SUV 11.3.  · 11/5/21 Lung, left mainstem bronchus biopsy with touch imprint smears: Invasive keratinizing squamous cell carcinoma. Lung, fine-needle aspiration of left hilar mass, ThinPrep and cell block: Keratinizing squamous cell carcinoma. Lung, left mainstem bronchial washing, ThinPrep and cell block: Keratinizing squamous cell carcinoma. · 11/15/2021-he was first seen by me. Recommended biopsy of right neck mass and also subcutaneous nodule right upper back. Discussed with ENT, Dr. Yandy Hinds. · 11/17/2021 Final Diagnosis: Excisional biopsy of a metastatic nodul in the thyroid gland as well as metastatic skin nodule. Right posterior lower neck\": Metastatic keratinizing squamous cell carcinoma involving fibroadipose tissue. 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 · 11/24/2021 MRI Brain W Wo Contrast No acute intracranial abnormality, mass or acute infarction. Chronic ischemic and atrophic changes. · 12/6/2021-essentially, stage IV disease. Squamous cell carcinoma PDL 35% metastatic left upper back skin nodule consistent with squamous cell carcinoma. Left thyroid mass consistent with metastatic, cell carcinoma. Recommended frontline chemotherapy with carboplatin AUC 5, paclitaxel 175 mg/m², Keytruda every 3 weeks x4 cycles to be followed by maintenance Keytruda if stable disease. · 12/9/2021 2D Echo Normal left ventricular size and systolic function EF 55 to 60%. · 12/20/2021-initiation of palliative chemotherapy with carboplatin/Taxol/Keytruda. · 2/18/22 CT CHEST W CONTRAST A mid to lower left hilar mass is smaller in size. Specifically,a central mass in the lower left hilum now measures 5 x 3.2 cm and previously measured 6 x 4.5 cm.  Somewhat curvilinear opacities in both upper lobes, worsening Date    BRONCHOSCOPY Left 11/5/2021    BRONCHOSCOPY ENDOBRONCHIAL ULTRASOUND performed by Brenton Ramos MD at [address]  02/02/2013    EF 65%, patent stent to LCX, no new occlusive disease    CATARACT REMOVAL WITH IMPLANT Bilateral 2014    CORONARY ANGIOPLASTY  01/28/2013    RUFINO to [address] HX VASCULAR STENT  2013    LUNG BIOPSY  11/05/2021    PROSTATE SURGERY      SHOULDER SURGERY Left        Social History:    Marital status:   Smoking status:Yes; 63 years; 5-6 a day  ETOH status:No  Resides:[place], [place]    Family History:   Family History   Problem Relation Age of Onset    Hypertension Mother     Stroke Mother     Cancer Brother 61        Lung    Cancer Brother 79        Prostate       Current Hospital Medications:    Current Outpatient Medications   Medication Sig Dispense Refill    metoprolol tartrate (LOPRESSOR) 25 MG tablet Take 1 tablet by mouth 2 times daily 180 tablet 3    predniSONE (DELTASONE) 50 MG tablet Take 1 tablet by mouth 24 hrs, 12 hrs and 1 hr prior to CT scan 3 tablet 0    diphenhydrAMINE (BENADRYL) 50 MG tablet Take 1 tablet 1 hour prior to CT scan 1 tablet 0    ondansetron (ZOFRAN) 4 MG tablet Take 1 tablet by mouth daily as needed for Nausea or Vomiting 30 tablet 0    dexamethasone (DECADRON) 4 MG tablet Take 5 tablets the night before and 5 tablets the morning of treatment every 3 weeks x 4 cycles 40 tablet 0    nitroGLYCERIN (NITROSTAT) 0.4 MG SL tablet Place 1 tablet under the tongue every 5 minutes as needed for Chest pain As directed 25 tablet 5    ezetimibe (ZETIA) 10 MG tablet Take 10 mg by mouth daily      montelukast (SINGULAIR) 10 MG tablet Take 10 mg by mouth nightly      calcium citrate (CALCITRATE) 950 MG tablet Take 1 tablet by mouth daily      metaxalone (SKELAXIN) 800 MG tablet Take 800 mg by mouth      traMADol (ULTRAM) 50 MG tablet Take 50 mg by mouth. Maria D Ortega omeprazole (PRILOSEC) 20 MG delayed release capsule Take 20 mg by mouth daily      Multiple Minerals-Vitamins (CALCIUM & VIT D3 BONE HEALTH PO) Take by mouth      Cholecalciferol (VITAMIN D3) 2000 UNITS CAPS Take by mouth      vitamin B-12 (CYANOCOBALAMIN) 1000 MCG tablet Take 1,000 mcg by mouth daily      aspirin 81 MG tablet Take 81 mg by mouth 2 times daily      folic acid (FOLVITE) 1 MG tablet Take 1 mg by mouth daily      Fluticasone Furoate-Vilanterol (BREO ELLIPTA) 200-25 MCG/INH AEPB Inhale into the lungs      azelastine HCl 0.15 % SOLN by Nasal route      cilostazol (PLETAL) 100 MG tablet Take 100 mg by mouth 2 times daily      gabapentin (NEURONTIN) 100 MG capsule Take 1 capsule by mouth 2 times daily for 30 days. 60 capsule 0    rosuvastatin (CRESTOR) 10 MG tablet Take 20 mg by mouth daily        No current facility-administered medications for this visit. Facility-Administered Medications Ordered in Other Visits   Medication Dose Route Frequency Provider Last Rate Last Admin    0.9 % sodium chloride infusion  20 mL/hr IntraVENous Once Enrrique Douglas MD        pembrolizumab [facility]) 200 mg in sodium chloride 0.9 % 100 mL chemo IVPB  200 mg IntraVENous Once Enrrique Douglas MD        sodium chloride flush 0.9 % injection 5-40 mL  5-40 mL IntraVENous PRN Enrrique Douglas MD           Allergies: Allergies   Allergen Reactions    Zithromax [Azithromycin]      abd pain.  Penicillins Rash     And pain      Shellfish-Derived Products Rash       Subjective   REVIEW OF SYSTEMS:   CONSTITUTIONAL: no fever, no night sweats, fatigue, weight loss, night sweats;   HEENT: no blurring of vision, no double vision, no hearing difficulty, no tinnitus, no ulceration, no dysplasia, no epistaxis;   LUNGS: no cough, no hemoptysis, no wheeze,  no shortness of breath;  CARDIOVASCULAR: no palpitation, no chest pain, no shortness of breath;  GI: no abdominal pain, no nausea, no vomiting, no diarrhea, mild constipation;  LLUVIA: no dysuria, no hematuria, no frequency or urgency, no nephrolithiasis;  MUSCULOSKELETAL: no joint pain, no swelling, no stiffness;  ENDOCRINE: no polyuria, no polydipsia, no cold or heat intolerance;  HEMATOLOGY: no easy bruising or bleeding, no history of clotting disorder;  DERMATOLOGY: no skin rash, no eczema, no pruritus;  PSYCHIATRY: no depression, no anxiety, no panic attacks, no suicidal ideation, no homicidal ideation;  NEUROLOGY: no syncope, no seizures, no numbness or tingling of hands, no numbness or tingling of feet, no paresis;       Objective   BP (!) 108/58 (Site: Left Upper Arm, Position: Sitting)   Pulse 91   Temp 98.4 °F (36.9 °C) (Oral)   Resp 18   Ht 5' 7\" (1.702 m)   Wt 136 lb (61.7 kg)   SpO2 95%   BMI 21.30 kg/m²      Wt Readings from Last 3 Encounters:   04/04/22 136 lb (61.7 kg)   03/17/22 139 lb (63 kg)   03/16/22 140 lb 3.2 oz (63.6 kg)       PHYSICAL EXAM:  CONSTITUTIONAL: Alert, appropriate, no acute distress  EYES: Non icteric, EOM intact, pupils equal round   ENT: Mucus membranes moist, no oral pharyngeal lesions, external inspection of ears and nose are normal.  NECK: Supple, no masses. No palpable thyroid mass  CHEST/LUNGS: CTA bilaterally, normal respiratory effort , lungs clear  CARDIOVASCULAR: RRR, no murmurs. No lower extremity edema  ABDOMEN: soft non-tender, active bowel sounds, no HSM. No palpable masses  EXTREMITIES: warm, full ROM in all 4 extremities, no focal weakness. SKIN: warm, dry with no rashes or lesions  LYMPH: No cervical, clavicular, axillary, or inguinal lymphadenopathy  NEUROLOGIC: follows commands, non focal   PSYCH: mood and affect appropriate.   Alert and oriented to time, place, person    LABORATORY RESULTS REVIEWED/ANALYZED BY ME:  Lab Results   Component Value Date    WBC 9.18 (H) 04/04/2022    HGB 9.5 (L) 04/04/2022    HCT 29.3 (L) 04/04/2022    .9 (H) 04/04/2022     04/04/2022     Lab Results   Component Value Date    NEUTROABS 6.89 (H) 04/04/2022     Lab Results   Component Value Date     04/04/2022    K 3.6 04/04/2022     04/04/2022    CO2 25 04/04/2022    BUN 11 04/04/2022    CREATININE 0.6 04/04/2022    GLUCOSE 97 04/04/2022    CALCIUM 9.2 04/04/2022    PROT 7.1 04/04/2022    LABALBU 3.5 04/04/2022    BILITOT 0.7 04/04/2022    ALKPHOS 77 04/04/2022    AST 69 (H) 04/04/2022    ALT 80 (H) 04/04/2022    LABGLOM >60 04/04/2022    GFRAA >60 11/24/2021    GLOB 3.6 04/04/2022       RADIOLOGY STUDIES REVIEWED BY ME:  3/14/2022 FL Esophagram Complete ([facility])  No esophageal mass or stricture. Prominent persistent contraction of the cricopharyngeus muscle with indentation of the posterior wall of the upper esophagus. Esophageal dysmotility with delayed clearance. ASSESSMENT:    Orders Placed This Encounter   Procedures    CBC with Auto Differential     Standing Status:   Future     Number of Occurrences:   1     Standing Expiration Date:   4/4/2023    Comprehensive Metabolic Panel     Standing Status:   Future     Number of Occurrences:   1     Standing Expiration Date:   4/4/2023    TSH     Standing Status:   Future     Number of Occurrences:   1     Standing Expiration Date:   4/4/2023    TSH without Reflex     Standing Status:   Future     Standing Expiration Date:   4/4/2023    Cortisol AM, Total     Standing Status:   Future     Standing Expiration Date:   4/4/2023      Oxana Umanzor was seen today for cancer. 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 Recommended:  · Carboplatin AUC 5  · Paclitaxel 175 mg/m²  · Keytruda 200mg IV   X4 cycles  every 3 weeks  To be followed by maintenance Keytruda 200 mg IV every 3 weeks  Discussed at length about logistics/side effects of treatment. Repeat CT chest after 3 cycles showed interval disease response. Continue maintenance pembrolizumab only. Chemotherapy related adverse events-fatigue, myalgia, arthralgia, nausea, insomnia, joint pain     Headaches- resolved. MRI brain was unremarkable. Cancer related anxiety-Recommend Ativan 0.5mg as needed    Fatigue-check TSH/cortisol a.m. PLAN:  · RTC with MD 3 weeks in treatment room  · Continue maintenance with pembrolizumab every 3 weeks  · CBC/CMP/TSH, Cortisol level today   · Recommend Megace suspension 400mg daily-script sent  · CT soft tissue neck, chest, abd/pelvis  · CT scan pre-meds-script sent  · Continue follow-up with [facility] speech therapy   · Continue follow-up with Dr. Shane Wick/[facility] ENT  · Continue Zofran 4mg for nausea  · Continue  OTC Benadryl and/or Melatonin for sleep  · Continue Ativan 0.5mg every 8 hrs as needed       Gary WALSH am pre charting  as Medical Assistant for Josy Antonio MD. Electronically signed by Gary Lama MA on 4/4/2022 at 2:54 PM CDT. Yola Vargas am scribing for Josy Antonio MD. Electronically signed by Diana Guzmán RN on 4/4/2022 at 9:22 AM CDT.     Dr Silvia WALSH personally performed the services described in this documentation as scribed by Thomas Sanderson RN in my presence and is both accurate and complete. I have seen, examined and reviewed this patient medication list, appropriate labs and imaging studies. I reviewed relevant medical records and others physicians notes. I discussed the plans of care with the patient. I answered all the questions to the patients satisfaction. I have also reviewed the chief complaint (CC) and part of the history (History of Present Illness (HPI), Past Family Social History [facility]), or Review of Systems (ROS) and made changes when appropriated.        (Please note that portions of this note were completed with a voice recognition program. Efforts were made to edit the dictations but occasionally words are mis-transcribed.)    Electronically signed by Arielle Rosa MD on 4/4/2022 at 9:39 AM

## 2022-04-04 ENCOUNTER — HOSPITAL ENCOUNTER (OUTPATIENT)
Dept: INFUSION THERAPY | Age: 77
Discharge: HOME | End: 2022-04-04
Payer: OTHER GOVERNMENT

## 2022-04-04 ENCOUNTER — OFFICE VISIT (OUTPATIENT)
Dept: HEMATOLOGY | Age: 77
End: 2022-04-04
Payer: OTHER GOVERNMENT

## 2022-04-04 VITALS
SYSTOLIC BLOOD PRESSURE: 108 MMHG | HEIGHT: 67 IN | OXYGEN SATURATION: 95 % | WEIGHT: 136 LBS | HEART RATE: 91 BPM | DIASTOLIC BLOOD PRESSURE: 58 MMHG | RESPIRATION RATE: 18 BRPM | TEMPERATURE: 98.4 F | BODY MASS INDEX: 21.35 KG/M2

## 2022-04-04 DIAGNOSIS — C34.92 SQUAMOUS CELL CARCINOMA OF LEFT LUNG (HCC): ICD-10-CM

## 2022-04-04 DIAGNOSIS — R53.83 FATIGUE DUE TO TREATMENT: ICD-10-CM

## 2022-04-04 DIAGNOSIS — Z51.12 ENCOUNTER FOR ANTINEOPLASTIC IMMUNOTHERAPY: ICD-10-CM

## 2022-04-04 DIAGNOSIS — C34.92 SQUAMOUS CELL CARCINOMA OF LEFT LUNG (HCC): Primary | ICD-10-CM

## 2022-04-04 DIAGNOSIS — R61 NIGHT SWEATS: ICD-10-CM

## 2022-04-04 DIAGNOSIS — Z91.041 CONTRAST MEDIA ALLERGY: ICD-10-CM

## 2022-04-04 DIAGNOSIS — R63.4 WEIGHT LOSS: ICD-10-CM

## 2022-04-04 DIAGNOSIS — Z71.89 CARE PLAN DISCUSSED WITH PATIENT: ICD-10-CM

## 2022-04-04 DIAGNOSIS — T45.1X5D ADVERSE EFFECT OF CHEMOTHERAPY, SUBSEQUENT ENCOUNTER: ICD-10-CM

## 2022-04-04 DIAGNOSIS — R63.0 DECREASED APPETITE: ICD-10-CM

## 2022-04-04 DIAGNOSIS — Z51.11 CHEMOTHERAPY MANAGEMENT, ENCOUNTER FOR: ICD-10-CM

## 2022-04-04 DIAGNOSIS — C34.92 STAGE IV SQUAMOUS CELL CARCINOMA OF LEFT LUNG (HCC): ICD-10-CM

## 2022-04-04 LAB
ALBUMIN SERPL-MCNC: 3.5 G/DL (ref 3.5–5.2)
ALP BLD-CCNC: 77 U/L (ref 40–130)
ALT SERPL-CCNC: 80 U/L (ref 21–72)
ANION GAP SERPL CALCULATED.3IONS-SCNC: 11 MMOL/L (ref 7–19)
AST SERPL-CCNC: 69 U/L (ref 17–59)
BASOPHILS ABSOLUTE: 0.02 K/UL (ref 0.01–0.08)
BASOPHILS RELATIVE PERCENT: 0.2 % (ref 0.1–1.2)
BILIRUB SERPL-MCNC: 0.7 MG/DL (ref 0.2–1.3)
BUN BLDV-MCNC: 11 MG/DL (ref 9–20)
CALCIUM SERPL-MCNC: 9.2 MG/DL (ref 8.4–10.2)
CHLORIDE BLD-SCNC: 104 MMOL/L (ref 98–111)
CO2: 25 MMOL/L (ref 22–29)
CORTISOL - AM: 15.9 UG/DL (ref 6.2–19.4)
CREAT SERPL-MCNC: 0.6 MG/DL (ref 0.6–1.2)
EOSINOPHILS ABSOLUTE: 0.03 K/UL (ref 0.04–0.54)
EOSINOPHILS RELATIVE PERCENT: 0.3 % (ref 0.7–7)
GFR NON-AFRICAN AMERICAN: >60
GLOBULIN: 3.6 G/DL
GLUCOSE BLD-MCNC: 97 MG/DL (ref 74–106)
HCT VFR BLD CALC: 29.3 % (ref 40.1–51)
HEMOGLOBIN: 9.5 G/DL (ref 13.7–17.5)
LYMPHOCYTES ABSOLUTE: 1.22 K/UL (ref 1.18–3.74)
LYMPHOCYTES RELATIVE PERCENT: 13.3 % (ref 19.3–53.1)
MCH RBC QN AUTO: 33.7 PG (ref 25.7–32.2)
MCHC RBC AUTO-ENTMCNC: 32.4 G/DL (ref 32.3–36.5)
MCV RBC AUTO: 103.9 FL (ref 79–92.2)
MONOCYTES ABSOLUTE: 1.02 K/UL (ref 0.24–0.82)
MONOCYTES RELATIVE PERCENT: 11.1 % (ref 4.7–12.5)
NEUTROPHILS ABSOLUTE: 6.89 K/UL (ref 1.56–6.13)
NEUTROPHILS RELATIVE PERCENT: 75.1 % (ref 34–71.1)
PDW BLD-RTO: 15.9 % (ref 11.6–14.4)
PLATELET # BLD: 242 K/UL (ref 163–337)
PMV BLD AUTO: 9 FL (ref 7.4–10.4)
POTASSIUM SERPL-SCNC: 3.6 MMOL/L (ref 3.5–5.1)
RBC # BLD: 2.82 M/UL (ref 4.63–6.08)
SODIUM BLD-SCNC: 140 MMOL/L (ref 137–145)
TOTAL PROTEIN: 7.1 G/DL (ref 6.3–8.2)
TSH SERPL DL<=0.05 MIU/L-ACNC: 2.59 UIU/ML (ref 0.27–4.2)
WBC # BLD: 9.18 K/UL (ref 4.23–9.07)

## 2022-04-04 PROCEDURE — 99214 OFFICE O/P EST MOD 30 MIN: CPT | Performed by: INTERNAL MEDICINE

## 2022-04-04 PROCEDURE — 80053 COMPREHEN METABOLIC PANEL: CPT

## 2022-04-04 PROCEDURE — 96413 CHEMO IV INFUSION 1 HR: CPT

## 2022-04-04 PROCEDURE — 6360000002 HC RX W HCPCS: Performed by: INTERNAL MEDICINE

## 2022-04-04 PROCEDURE — 36415 COLL VENOUS BLD VENIPUNCTURE: CPT

## 2022-04-04 PROCEDURE — 85025 COMPLETE CBC W/AUTO DIFF WBC: CPT

## 2022-04-04 PROCEDURE — 2580000003 HC RX 258: Performed by: INTERNAL MEDICINE

## 2022-04-04 RX ORDER — MEGESTROL ACETATE 40 MG/ML
400 SUSPENSION ORAL DAILY
Qty: 480 ML | Refills: 5 | Status: SHIPPED | OUTPATIENT
Start: 2022-04-04

## 2022-04-04 RX ORDER — SODIUM CHLORIDE 9 MG/ML
25 INJECTION, SOLUTION INTRAVENOUS PRN
Status: CANCELLED | OUTPATIENT
Start: 2022-04-04

## 2022-04-04 RX ORDER — ALBUTEROL SULFATE 90 UG/1
4 AEROSOL, METERED RESPIRATORY (INHALATION) PRN
Status: CANCELLED | OUTPATIENT
Start: 2022-04-04

## 2022-04-04 RX ORDER — DIPHENHYDRAMINE HYDROCHLORIDE 50 MG/ML
50 INJECTION INTRAMUSCULAR; INTRAVENOUS
Status: CANCELLED | OUTPATIENT
Start: 2022-04-04

## 2022-04-04 RX ORDER — EPINEPHRINE 1 MG/ML
0.3 INJECTION, SOLUTION, CONCENTRATE INTRAVENOUS PRN
Status: CANCELLED | OUTPATIENT
Start: 2022-04-04

## 2022-04-04 RX ORDER — HEPARIN SODIUM (PORCINE) LOCK FLUSH IV SOLN 100 UNIT/ML 100 UNIT/ML
500 SOLUTION INTRAVENOUS PRN
Status: CANCELLED | OUTPATIENT
Start: 2022-04-04

## 2022-04-04 RX ORDER — ACETAMINOPHEN 325 MG/1
650 TABLET ORAL
Status: CANCELLED | OUTPATIENT
Start: 2022-04-04

## 2022-04-04 RX ORDER — SODIUM CHLORIDE 0.9 % (FLUSH) 0.9 %
5-40 SYRINGE (ML) INJECTION PRN
Status: DISCONTINUED | OUTPATIENT
Start: 2022-04-04 | End: 2022-04-05 | Stop reason: HOSPADM

## 2022-04-04 RX ORDER — SODIUM CHLORIDE 9 MG/ML
INJECTION, SOLUTION INTRAVENOUS CONTINUOUS
Status: CANCELLED | OUTPATIENT
Start: 2022-04-04

## 2022-04-04 RX ORDER — SODIUM CHLORIDE 0.9 % (FLUSH) 0.9 %
5-40 SYRINGE (ML) INJECTION PRN
Status: CANCELLED | OUTPATIENT
Start: 2022-04-04

## 2022-04-04 RX ORDER — DIPHENHYDRAMINE HCL 25 MG
TABLET ORAL
Qty: 1 TABLET | Refills: 0 | Status: SHIPPED | OUTPATIENT
Start: 2022-04-04 | End: 2022-04-25 | Stop reason: SDUPTHER

## 2022-04-04 RX ORDER — SODIUM CHLORIDE 9 MG/ML
5-40 INJECTION INTRAVENOUS PRN
Status: CANCELLED | OUTPATIENT
Start: 2022-04-04

## 2022-04-04 RX ORDER — MEPERIDINE HYDROCHLORIDE 50 MG/ML
12.5 INJECTION INTRAMUSCULAR; INTRAVENOUS; SUBCUTANEOUS PRN
Status: CANCELLED | OUTPATIENT
Start: 2022-04-04

## 2022-04-04 RX ORDER — SODIUM CHLORIDE 9 MG/ML
20 INJECTION, SOLUTION INTRAVENOUS ONCE
Status: CANCELLED | OUTPATIENT
Start: 2022-04-04 | End: 2022-04-04

## 2022-04-04 RX ORDER — PREDNISONE 50 MG/1
TABLET ORAL
Qty: 3 TABLET | Refills: 0 | Status: SHIPPED | OUTPATIENT
Start: 2022-04-04 | End: 2022-04-25 | Stop reason: SDUPTHER

## 2022-04-04 RX ORDER — SODIUM CHLORIDE 9 MG/ML
20 INJECTION, SOLUTION INTRAVENOUS ONCE
Status: COMPLETED | OUTPATIENT
Start: 2022-04-04 | End: 2022-04-05

## 2022-04-04 RX ORDER — ONDANSETRON 2 MG/ML
8 INJECTION INTRAMUSCULAR; INTRAVENOUS
Status: CANCELLED | OUTPATIENT
Start: 2022-04-04

## 2022-04-04 RX ADMIN — SODIUM CHLORIDE 200 MG: 9 INJECTION, SOLUTION INTRAVENOUS at 09:42

## 2022-04-04 RX ADMIN — SODIUM CHLORIDE 20 ML/HR: 9 INJECTION, SOLUTION INTRAVENOUS at 09:42

## 2022-04-04 RX ADMIN — SODIUM CHLORIDE, PRESERVATIVE FREE 10 ML: 5 INJECTION INTRAVENOUS at 10:15

## 2022-04-07 NOTE — PROGRESS NOTES
Speech Language Pathology ReEvaluation Note    Patient: Chucky Jamison                                                                                     Visit Date: 2022  :     1945    Referring practitioner:    KARLA Pascal  Date of Initial Visit:          Type: THERAPY  Noted: 2022    Patient seen for 6 sessions    Visit Diagnoses:    ICD-10-CM ICD-9-CM   1. Oropharyngeal dysphagia  R13.12 787.22   2. Dysphonia  R49.0 784.42     SUBJECTIVE     Patient seen today after follow up with Dr. Walsh. ENT notes reviewed. 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 He c/o neck soreness indicating sternocleidomastoid muscle as well as submental muscles. He feels this may be due to overdoing his stretching exercises. He may benefit from PT evaluation for neck and possible lymphedema, however he wishes to hold of on a referral for this for now. He appeared weak and tired today, he lost his balance 2x.     OBJECTIVE   GOALS  Goals                                            STG  Comments Status   Patient will improve oral skills to enhance safety and increase eating efficiency and bolus control as measured by improved posterior propulsion of the bolus. Patient stated that he is able to continue with his oral exercises without difficulty.  Reviewed   Patient will improve epiglottic inversion, increase base of the tongue strength and posterior pharyngeal wall excursion and increase efficiency of cough to clear residue from the airway as measured by decreased overt signs and symptoms of aspiration and decreased penetration and aspiration on repeat instrumental swallow study, if applicable.   Patient stated that he has continued with the effortful swallow and supraglottic swallow exercises.      SLP discussed using EMST-75 to improve swallowing and cough efficiency. Patient signed consent and  SLP will pursue ordering.  Reviewed.    Patient will report decreased difficulty with swallowing and improved EAT-10 score.  Patient feels he is doing a little better than when last seen.  He continues to present with shortness of breath due to absence of vocal closure.  Reviewed.            LTG        Patient will safely consume full PO diet of regular consistency food and thin liquids without difficulty or complications such as aspiration or pneumonia.  Patient stated that his swallow has improved slightly since last seen. He is eating slowly and carefully. He is not choking as much but does not have an appetite. Dr. Trujillo has given him medicaiton for this.  Reviewed.         Therapy Education/Self Care    Details: POC   Given Information on EMST-75   Progress: New and reinforced   Education provided to:  Patient and wife   Level of understanding Verbalized and demonstrated             Total Time of Visit:             40  mins         ASSESSMENT/PLAN     ASSESSMENT: Patient doing slightly better than last visit. 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 Voice continues to be aphonic with loss of air and significant effort to produce any sound with compensatory structures, he is not able to produce cough. He feels he can eat if careful and slow but does not have an appetite.     PLAN: SLP to help patient with ordering EMST-75 for swallowing exercise. Continue therapy and home exercise. Patient to f/u with Dr. Walsh in 2 months.     Progress Note Due Date:3/23/22  Recertification Due Date:4/23/22    SIGNATURE: Bonita Cohen CCC-SLP, License #: [id number]  Electronically Signed on 4/7/2022      Clinical Progress: improved  Home Program Compliance: Yes  Progress toward previous goals: Not Met      90 Day Recertification  Certification Period: 4/7/2022 through 7/5/2022  I certify that the therapy services are furnished while this patient is under my care.  The services outlined above are  required by this patient, and will be reviewed every 90 days.     PHYSICIAN: Hilda Irene APRN (NPI: [id number])    Signature:___________________________________________DATE: _________    Please sign and return via fax to [phone].   Thank you so much for letting us work with Chucky. I appreciate your letting us work with your patients. If you have any questions or concerns, please don't hesitate to contact me.

## 2022-04-13 ENCOUNTER — HOSPITAL ENCOUNTER (OUTPATIENT)
Dept: CT IMAGING | Age: 77
Discharge: HOME | End: 2022-04-13
Payer: OTHER GOVERNMENT

## 2022-04-13 DIAGNOSIS — C34.92 SQUAMOUS CELL CARCINOMA OF LEFT LUNG (HCC): ICD-10-CM

## 2022-04-13 PROCEDURE — 74177 CT ABD & PELVIS W/CONTRAST: CPT

## 2022-04-13 PROCEDURE — 70491 CT SOFT TISSUE NECK W/DYE: CPT

## 2022-04-13 PROCEDURE — 71260 CT THORAX DX C+: CPT

## 2022-04-13 PROCEDURE — 6360000004 HC RX CONTRAST MEDICATION: Performed by: INTERNAL MEDICINE

## 2022-04-13 RX ADMIN — IOPAMIDOL 75 ML: 755 INJECTION, SOLUTION INTRAVENOUS at 10:34

## 2022-04-15 PROBLEM — R05.9 COUGH: Status: RESOLVED | Noted: 2021-11-04 | Resolved: 2022-04-15

## 2022-04-22 NOTE — PROGRESS NOTES
MEDICAL ONCOLOGY PROGRESS NOTE    Pt Name: Nabor Nguyễn  MRN: [id number]  YOB: 1945  Date of evaluation: 4/25/2022      HISTORY OF PRESENT ILLNESS:    Reason for MD visit-toxicity assessment/disease management  The patient is a pleasant 78years old male who presents today for consideration of maintenance pembrolizumab. Patient has been tolerating treatments complains of fatigue. However, symptoms of muscle pain and extreme fatigue has improved somewhat. He had a repeat CT chest abdomen pelvis performed to reassess disease status. Diagnosis  · Squamous cell carcinoma, Left lung, Nov 2021  · [id number], stage IV (left thyroid nodule, skin metastasis)  · 11/5/21-PD-L1 Expressed 35%    Treatment Summary  · 12/20/21- Initiated Carboplatin AUC 5 Paclitaxel 175mg/m2 and Keytruda    · 3/14/22 Initiated maintenance Pembrolizumab 200mg every 3 weeks    Cancer History  Clark Ordonez was first seen by me on 11/15/2021. He was referred by pulmonary at [facility] for a diagnosis of lung cancer. He developed complaints of cough. Chest x-ray was performed and showed a lung mass. Patient is a currently smoker. He lost about 25 pounds. · 9/28/21 CXR [facility]): Questionable mass density seen only on the lateral view with patchy density in left upper lobe. Further evaluation with computed tomography is recommended. COPD. · 10/4/21 CT chest [facility]): Mass lesion left hilum extending caudally along the mediastinum and inseparable from the mediastinum in this region. This is highly suspicious for malignancy. Consolidation and/or neoplastic mass left upper lobe as described above Emphysema. New right thyroid nodule, probably solid. 2 chronic cysts within the liver. · 10/22/21- PET scan [facility] [place]): Essentially, mass in the right side of the neck of the neck base measuring 2.8 cm x 2.4 cm. SUV 18.9. Also small focus soft tissue of the back at the base of the neck on the right.   Lesion measured 9 mm.  SUV 10.5. Large mass in the left hilum measures 5.1 x 4.2 cm (SUV 18.6). Extensive parenchymal abnormality with a high SUV uptake in the left upper lobe. Apical component with a small amount of cavitation measuring 2.9 x 2.6 cm with SUV 11.3.  · 11/5/21 Lung, left mainstem bronchus biopsy with touch imprint smears: Invasive keratinizing squamous cell carcinoma. Lung, fine-needle aspiration of left hilar mass, ThinPrep and cell block: Keratinizing squamous cell carcinoma. Lung, left mainstem bronchial washing, ThinPrep and cell block: Keratinizing squamous cell carcinoma. · 11/15/2021-he was first seen by me. Recommended biopsy of right neck mass and also subcutaneous nodule right upper back. Discussed with ENT, Dr. Steph Howe. · 11/17/2021 Final Diagnosis: Excisional biopsy of a metastatic nodul in the thyroid gland as well as metastatic skin nodule. Right posterior lower neck\": Metastatic keratinizing squamous cell carcinoma involving fibroadipose tissue. 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 · 11/24/2021 MRI Brain W Wo Contrast No acute intracranial abnormality, mass or acute infarction. Chronic ischemic and atrophic changes. · 12/6/2021-essentially, stage IV disease. Squamous cell carcinoma PDL 35% metastatic left upper back skin nodule consistent with squamous cell carcinoma. Left thyroid mass consistent with metastatic, cell carcinoma. Recommended frontline chemotherapy with carboplatin AUC 5, paclitaxel 175 mg/m², Keytruda every 3 weeks x4 cycles to be followed by maintenance Keytruda if stable disease. · 12/9/2021 2D Echo Normal left ventricular size and systolic function EF 55 to 60%. · 12/20/2021-initiation of palliative chemotherapy with carboplatin/Taxol/Keytruda. · 2/18/22 CT CHEST W CONTRAST A mid to lower left hilar mass is smaller in size.  Specifically,a central mass in the lower left hilum now measures 5 x 3.2 cm and previously measured 6 x 4.5 cm. Somewhat curvilinear opacities in both upper lobes, worsening on the left and developing on the right when compared to the prior study. There is an increasing area of similar opacity in the left upper lobe located slightly more superiorly now measuring 2.2 x 1.4 cm and previously measuring about 1.2 x 1.5 cm. There is another new area that has developed more inferiorly in the left upper lobe measuring 1.6 x 1 cm. There are similar appearing new developing  smewhat linear opacities in the right upper lobeGiven the curvilinear appearance and fine calcifications, progressive massive fibrosis is the leading differential. Infectious etiology cannot be ruled out, including atypical infections, such as TB. Pulmonary consultation recommended for this finding. Neoplastic disease is felt to be less likely given the appearance and bilateral upper lobe involvement. There is a stable subpleural right upper lobe nodule measuring 4-5 mm. Severe centrilobular emphysema. Atheromatous disease of the thoracic aorta and coronary arteries. No mediastinal lymphadenopathy is seen. Small densities in the trachea on the right and posteriorly are likely retained secretions. Interim right thyroidectomy. · 2/21/2022-essentially, interval response to treatment. Continue carboplatin/Taxol and Keytruda  · 3/14/22 Initiated maintenance Pembrolizumab 200mg every 3 weeks  · 3/14/2022 FL Esophagram Complete ([facility])  No esophageal mass or stricture. Prominent persistent contraction of the cricopharyngeus muscle with indentation of the posterior wall of the upper esophagus. Esophageal dysmotility with delayed clearance. · 4/13/2022 CT Chest W Contrast New ill-defined 1.6 cm spiculated pleural-based opacity in the left lower lobe medially image 94 series 4. This could be neoplastic. Infection and inflammation are also considered. Central lower left hilar area of mass effect is smaller on the current study.  Stable small right upper lobe pulmonary nodule. Worsening consolidation in the right upper lobe. This is likely infectious or inflammatory. Curvilinear opacities in the left upper lobe appear relatively stable and may also be infectious or inflammatory. Please see the neck, abdomen and pelvis CT report separately. · 4/13/2022 CT Abd/Pelvis W IV Contrast (Oral) New ill-defined 1.6 cm spiculated pleural-based opacity in the left lower lobe medially image 94 series 4. This could be neoplastic. Infection and inflammation are also considered. Central lower left hilar area of mass effect is smaller on the current study. Stable small right upper lobe pulmonary nodule. Worsening consolidation in the right upper lobe. This is likely infectious or inflammatory. Curvilinear opacities in the left upper lobe appear relatively stable and may also be infectious or inflammatory. Please see the neck, abdomen and pelvis CT report separately. · 4/13/2022 CT Soft Tissue Neck W Contrast No pathologically enlarged cervical chain or posterior triangle lymphadenopathy is present. There is asymmetry at the level of the right vocal fold with asymmetric soft tissue density and adduction of the right vocal fold. Correlation is recommended with the patient's history. Those portions of the brain which are visualized are normal in appearance. Please see previously dictated CT of the chest report for findings within both upper lobes. The right lobe of the thyroid gland is not visualized and I suspect is surgically absent. · 5/25/2022-essentially, findings of mixed response in the chest.  Continue Keytruda. Repeat CT chest in 2 months.     Past Medical History:    Past Medical History:   Diagnosis Date    CAD (coronary artery disease)     RUFINO to LCX 1/13    Hyperlipidemia     Lung cancer ([address]     Nicotine dependence, cigarettes, w unsp disorders 12/1/2017    Prostate cancer ([address]     PVD (peripheral vascular disease) ([address]     Tobacco abuse        Past Surgical History:    Past Surgical History:   Procedure Laterality Date    BRONCHOSCOPY Left 11/5/2021    BRONCHOSCOPY ENDOBRONCHIAL ULTRASOUND performed by Luis Antonio Bey MD at [address] [facility]   [address]  02/02/2013    EF 65%, patent stent to LCX, no new occlusive disease    CATARACT REMOVAL WITH IMPLANT Bilateral 2014    CORONARY ANGIOPLASTY  01/28/2013    RUFINO to LCX    HERNIA REPAIR      HX VASCULAR STENT  2013    LUNG BIOPSY  11/05/2021    PROSTATE SURGERY      SHOULDER SURGERY Left        Social History:    Marital status:   Smoking status:Yes; 60 years; 5-6 a day  ETOH status:No  Resides:[place], [place]    Family History:   Family History   Problem Relation Age of Onset    Hypertension Mother     Stroke Mother     Cancer Brother 61        Lung    Cancer Brother 79        Prostate       Current Hospital Medications:    Current Outpatient Medications   Medication Sig Dispense Refill    megestrol (MEGACE) 40 MG/ML suspension Take 10 mLs by mouth daily 480 mL 5    predniSONE (DELTASONE) 50 MG tablet Take 1 tablet 24 hrs, 12 hrs and 1 hr prior to CT scans 3 tablet 0    diphenhydrAMINE (BENADRYL) 25 MG tablet Take 1 hour prior to CT scans 1 tablet 0    metoprolol tartrate (LOPRESSOR) 25 MG tablet Take 1 tablet by mouth 2 times daily 180 tablet 3    gabapentin (NEURONTIN) 100 MG capsule Take 1 capsule by mouth 2 times daily for 30 days.  60 capsule 0    ondansetron (ZOFRAN) 4 MG tablet Take 1 tablet by mouth daily as needed for Nausea or Vomiting 30 tablet 0    nitroGLYCERIN (NITROSTAT) 0.4 MG SL tablet Place 1 tablet under the tongue every 5 minutes as needed for Chest pain As directed 25 tablet 5    ezetimibe (ZETIA) 10 MG tablet Take 10 mg by mouth daily      montelukast (SINGULAIR) 10 MG tablet Take 10 mg by mouth nightly      rosuvastatin (CRESTOR) 10 MG tablet Take 20 mg by mouth daily       calcium citrate (CALCITRATE) 950 MG tablet Take 1 tablet by mouth daily      metaxalone (SKELAXIN) 800 MG tablet Take 800 mg by mouth      traMADol (ULTRAM) 50 MG tablet Take 50 mg by mouth. Lilo Song omeprazole (PRILOSEC) 20 MG delayed release capsule Take 20 mg by mouth daily      Multiple Minerals-Vitamins (CALCIUM & VIT D3 BONE HEALTH PO) Take by mouth      Cholecalciferol (VITAMIN D3) 2000 UNITS CAPS Take by mouth      vitamin B-12 (CYANOCOBALAMIN) 1000 MCG tablet Take 1,000 mcg by mouth daily      aspirin 81 MG tablet Take 81 mg by mouth 2 times daily      folic acid (FOLVITE) 1 MG tablet Take 1 mg by mouth daily      Fluticasone Furoate-Vilanterol (BREO ELLIPTA) 200-25 MCG/INH AEPB Inhale into the lungs      azelastine HCl 0.15 % SOLN by Nasal route      cilostazol (PLETAL) 100 MG tablet Take 100 mg by mouth 2 times daily       No current facility-administered medications for this visit. Facility-Administered Medications Ordered in Other Visits   Medication Dose Route Frequency Provider Last Rate Last Admin    0.9 % sodium chloride infusion  20 mL/hr IntraVENous Once Kalyn Jose MD        pembrolizumab [facility]) 200 mg in sodium chloride 0.9 % 100 mL chemo IVPB  200 mg IntraVENous Once Kalyn Jose MD        sodium chloride flush 0.9 % injection 5-40 mL  5-40 mL IntraVENous PRN Kalyn Jose MD        heparin flush 100 UNIT/ML injection 500 Units  500 Units IntraCATHeter PRN Kalyn Jose MD           Allergies: Allergies   Allergen Reactions    Zithromax [Azithromycin]      abd pain.      Penicillins Rash     And pain      Shellfish-Derived Products Rash     Subjective     REVIEW OF SYSTEMS:   CONSTITUTIONAL: no fever, no night sweats, fatigue, insomnia;  HEENT: no blurring of vision, no double vision, no hearing difficulty, no tinnitus, no ulceration, no dysplasia, no epistaxis;   LUNGS: no cough, no hemoptysis, no wheeze,  no shortness of breath;  CARDIOVASCULAR: no palpitation, no chest pain, no shortness of breath;  GI: no abdominal pain, no nausea, no vomiting, no diarrhea, no constipation;  LLUVIA: no dysuria, no hematuria, no frequency or urgency, no nephrolithiasis;  MUSCULOSKELETAL: no joint pain, no swelling, no stiffness;  ENDOCRINE: no polyuria, no polydipsia, no cold or heat intolerance;  HEMATOLOGY: no easy bruising or bleeding, no history of clotting disorder;  DERMATOLOGY: no skin rash, no eczema, no pruritus;  PSYCHIATRY: no depression, no anxiety, no panic attacks, no suicidal ideation, no homicidal ideation;  NEUROLOGY: no syncope, no seizures, no numbness or tingling of hands, no numbness or tingling of feet, no paresis;    Objective   /86   Pulse 73   Temp 98.4 °F (36.9 °C) (Oral)   Resp 16   Ht 5' 7\" (1.702 m)   Wt 138 lb (62.6 kg)   SpO2 95%   BMI 21.61 kg/m²       PHYSICAL EXAM:  CONSTITUTIONAL: Alert, appropriate, no acute distress  EYES: Non icteric, EOM intact, pupils equal round   ENT: Mucus membranes moist, no oral pharyngeal lesions, external inspection of ears and nose are normal.  NECK: Supple, no masses. No palpable thyroid mass  CHEST/LUNGS: CTA bilaterally, normal respiratory effort   CARDIOVASCULAR: RRR, no murmurs. No lower extremity edema  ABDOMEN: soft non-tender, active bowel sounds, no HSM. No palpable masses  EXTREMITIES: warm, full ROM in all 4 extremities, no focal weakness. SKIN: warm, dry with no rashes or lesions  LYMPH: No cervical, clavicular, axillary, or inguinal lymphadenopathy  NEUROLOGIC: follows commands, non focal   PSYCH: mood and affect appropriate.   Alert and oriented to time, place, person    LABORATORY RESULTS REVIEWED/ANALYZED BY ME:  Lab Results   Component Value Date    WBC 9.67 (H) 04/25/2022    HGB 10.6 (L) 04/25/2022    HCT 33.9 (L) 04/25/2022    .4 (H) 04/25/2022     04/25/2022     Lab Results   Component Value Date    NEUTROABS 7.41 (H) 04/25/2022     Lab Results   Component Value Date     04/25/2022    K 4.1 04/25/2022     04/25/2022    CO2 21 (L) 04/25/2022    BUN 20 04/25/2022    CREATININE 0.7 04/25/2022    GLUCOSE 75 04/25/2022    CALCIUM 9.2 04/25/2022    PROT 6.9 04/25/2022    LABALBU 3.7 04/25/2022    BILITOT 0.3 04/25/2022    ALKPHOS 61 04/25/2022    AST 33 04/25/2022    ALT 37 04/25/2022    LABGLOM >60 04/25/2022    GFRAA >60 11/24/2021    GLOB 3.2 04/25/2022         RADIOLOGY STUDIES REVIEWED BY ME:  4/13/2022 CT Chest W Contrast New ill-defined 1.6 cm spiculated pleural-based opacity in the left lower lobe medially image 94 series 4. This could be neoplastic. Infection and inflammation are also considered. Central lower left hilar area of mass effect is smaller on the current study. Stable small right upper lobe pulmonary nodule. Worsening consolidation in the right upper lobe. This is likely infectious or inflammatory. Curvilinear opacities in the left upper lobe appear relatively stable and may also be infectious or inflammatory. Please see the neck, abdomen and pelvis CT report separately. 4/13/2022 CT Abd/Pelvis W IV Contrast (Oral) New ill-defined 1.6 cm spiculated pleural-based opacity in the left lower lobe medially image 94 series 4. This could be neoplastic. Infection and inflammation are also considered. Central lower left hilar area of mass effect is smaller on the current study. Stable small right upper lobe pulmonary nodule. Worsening consolidation in the right upper lobe. This is likely infectious or inflammatory. Curvilinear opacities in the left upper lobe appear relatively stable and may also be infectious or inflammatory. Please see the neck, abdomen and pelvis CT report separately. 4/13/2022 CT Soft Tissue Neck W Contrast No pathologically enlarged cervical chain or posterior triangle lymphadenopathy is present. There is asymmetry at the level of the right vocal fold with asymmetric soft tissue density and adduction of the right vocal fold. Correlation is recommended with the patient's history.  Those portions of the brain which are visualized are normal in appearance. Please see previously dictated CT of the chest report for findings within both upper lobes. The right lobe of the thyroid gland is not visualized and I suspect is surgically absent. ASSESSMENT:    Orders Placed This Encounter   Procedures    TSH      Fred Overton was seen today for lung cancer. Diagnoses and all orders for this visit:    Squamous cell carcinoma of left lung (HCC)    Contrast media allergy  Comments:  steroids prior to scans     Encounter for antineoplastic immunotherapy    Care plan discussed with patient    Fatigue due to treatment    Stage IV squamous cell carcinoma of left lung (HCC)    Insomnia due to medical condition    Other orders  -     TSH      Non-small cell lung cancer, SCC [id number] stage IV (left thyroid, skin subcutaneous nodule) PDL 1 35%  Essentially, findings of squamous cell non-small cell lung cancer left lung with skin metastasis/left thyroid metastasis  Reviewed pathology skin nodule/left thyroid mass which were both consistent with metastatic squamous cell carcinoma. Recommended:  · Carboplatin AUC 5  · Paclitaxel 175 mg/m²  · Keytruda 200mg IV   X4 cycles  every 3 weeks  To be followed by maintenance Keytruda 200 mg IV every 3 weeks  Discussed at length about logistics/side effects of treatment. -Repeat CT soft tissue neck, chest, abdomen pelvis on 4/13/2022 showed findings of mixed response in the chest.    Continue maintenance pembrolizumab only. -Repeat CT chest around mid June 2022. Chemotherapy related adverse events-fatigue, myalgia, arthralgia, nausea, insomnia, joint pain     Headaches- resolved. MRI brain was unremarkable. Cancer related anxiety-Recommend Ativan 0.5mg as needed    Fatigue-check TSH/cortisol a.m.     Insomnia-try melatonin 5 mg and Benadryl 25 mg at bedtime      PLAN:  · RTC with MD 3 weeks in treatment room  · Continue maintenance with pembrolizumab every 3 weeks  · CBC/CMP/TSH level today   · Continue Megace suspension 400mg daily  · CT chest 2 months  · CT scan pre-meds-script sent  · Continue follow-up with [facility] speech therapy   · Continue follow-up with Dr. Edwin Wick/[facility] ENT  · Continue Zofran 4mg for nausea  · Continue  OTC Benadryl and/or Melatonin for sleep  · Continue Ativan 0.5mg every 8 hrs as needed        IAurelia am pre charting  as Medical Assistant for Rajendra Espinosa MD. Electronically signed by Aurelia Cook MA on 4/25/2022 at 2:35 PM CDT. Priscilla Bartholomew am scribing for Rajendra Espinosa MD. Electronically signed by Joslyn Alexis RN on 4/25/2022 at 10:05 AM CDT. I, Dr Hope Solano, personally performed the services described in this documentation as scribed by Joslyn Alexis RN in my presence and is both accurate and complete. I have seen, examined and reviewed this patient medication list, appropriate labs and imaging studies. I reviewed relevant medical records and others physicians notes. I discussed the plans of care with the patient. I answered all the questions to the patients satisfaction. I have also reviewed the chief complaint (CC) and part of the history (History of Present Illness (HPI), Past Family Social History [facility]), or Review of Systems (ROS) and made changes when appropriated. (Please note that portions of this note were completed with a voice recognition program. Efforts were made to edit the dictations but occasionally words are mis-transcribed. )Electronically signed by Rajendra Espinosa MD on 4/25/2022 at 10:23 AM

## 2022-04-25 ENCOUNTER — OFFICE VISIT (OUTPATIENT)
Dept: HEMATOLOGY | Age: 77
End: 2022-04-25
Payer: OTHER GOVERNMENT

## 2022-04-25 ENCOUNTER — HOSPITAL ENCOUNTER (OUTPATIENT)
Dept: INFUSION THERAPY | Age: 77
Discharge: HOME | End: 2022-04-25
Payer: OTHER GOVERNMENT

## 2022-04-25 VITALS
OXYGEN SATURATION: 95 % | HEART RATE: 73 BPM | WEIGHT: 138 LBS | TEMPERATURE: 98.4 F | RESPIRATION RATE: 16 BRPM | DIASTOLIC BLOOD PRESSURE: 86 MMHG | SYSTOLIC BLOOD PRESSURE: 120 MMHG | HEIGHT: 67 IN | BODY MASS INDEX: 21.66 KG/M2

## 2022-04-25 DIAGNOSIS — C34.92 STAGE IV SQUAMOUS CELL CARCINOMA OF LEFT LUNG (HCC): ICD-10-CM

## 2022-04-25 DIAGNOSIS — T45.1X5A ANTINEOPLASTIC CHEMOTHERAPY INDUCED ANEMIA: ICD-10-CM

## 2022-04-25 DIAGNOSIS — C34.92 SQUAMOUS CELL CARCINOMA OF LEFT LUNG (HCC): ICD-10-CM

## 2022-04-25 DIAGNOSIS — Z91.041 CONTRAST MEDIA ALLERGY: ICD-10-CM

## 2022-04-25 DIAGNOSIS — R91.8 LUNG MASS: Primary | ICD-10-CM

## 2022-04-25 DIAGNOSIS — R53.83 FATIGUE DUE TO TREATMENT: ICD-10-CM

## 2022-04-25 DIAGNOSIS — C34.92 SQUAMOUS CELL CARCINOMA OF LEFT LUNG (HCC): Primary | ICD-10-CM

## 2022-04-25 DIAGNOSIS — Z51.12 ENCOUNTER FOR ANTINEOPLASTIC IMMUNOTHERAPY: ICD-10-CM

## 2022-04-25 DIAGNOSIS — R53.83 FATIGUE DUE TO TREATMENT: Primary | ICD-10-CM

## 2022-04-25 DIAGNOSIS — G47.01 INSOMNIA DUE TO MEDICAL CONDITION: ICD-10-CM

## 2022-04-25 DIAGNOSIS — Z71.89 CARE PLAN DISCUSSED WITH PATIENT: ICD-10-CM

## 2022-04-25 DIAGNOSIS — D64.81 ANTINEOPLASTIC CHEMOTHERAPY INDUCED ANEMIA: ICD-10-CM

## 2022-04-25 LAB
ALBUMIN SERPL-MCNC: 3.7 G/DL (ref 3.5–5.2)
ALP BLD-CCNC: 61 U/L (ref 40–130)
ALT SERPL-CCNC: 37 U/L (ref 21–72)
ANION GAP SERPL CALCULATED.3IONS-SCNC: 8 MMOL/L (ref 7–19)
AST SERPL-CCNC: 33 U/L (ref 17–59)
BASOPHILS ABSOLUTE: 0.03 K/UL (ref 0.01–0.08)
BASOPHILS RELATIVE PERCENT: 0.3 % (ref 0.1–1.2)
BILIRUB SERPL-MCNC: 0.3 MG/DL (ref 0.2–1.3)
BUN BLDV-MCNC: 20 MG/DL (ref 9–20)
CALCIUM SERPL-MCNC: 9.2 MG/DL (ref 8.4–10.2)
CHLORIDE BLD-SCNC: 111 MMOL/L (ref 98–111)
CO2: 21 MMOL/L (ref 22–29)
CREAT SERPL-MCNC: 0.7 MG/DL (ref 0.6–1.2)
EOSINOPHILS ABSOLUTE: 0.07 K/UL (ref 0.04–0.54)
EOSINOPHILS RELATIVE PERCENT: 0.7 % (ref 0.7–7)
GFR NON-AFRICAN AMERICAN: >60
GLOBULIN: 3.2 G/DL
GLUCOSE BLD-MCNC: 75 MG/DL (ref 74–106)
HCT VFR BLD CALC: 33.9 % (ref 40.1–51)
HEMOGLOBIN: 10.6 G/DL (ref 13.7–17.5)
LYMPHOCYTES ABSOLUTE: 1.36 K/UL (ref 1.18–3.74)
LYMPHOCYTES RELATIVE PERCENT: 14.1 % (ref 19.3–53.1)
MCH RBC QN AUTO: 32.3 PG (ref 25.7–32.2)
MCHC RBC AUTO-ENTMCNC: 31.3 G/DL (ref 32.3–36.5)
MCV RBC AUTO: 103.4 FL (ref 79–92.2)
MONOCYTES ABSOLUTE: 0.8 K/UL (ref 0.24–0.82)
MONOCYTES RELATIVE PERCENT: 8.3 % (ref 4.7–12.5)
NEUTROPHILS ABSOLUTE: 7.41 K/UL (ref 1.56–6.13)
NEUTROPHILS RELATIVE PERCENT: 76.6 % (ref 34–71.1)
PDW BLD-RTO: 14.9 % (ref 11.6–14.4)
PLATELET # BLD: 179 K/UL (ref 163–337)
PMV BLD AUTO: 9.1 FL (ref 7.4–10.4)
POTASSIUM SERPL-SCNC: 4.1 MMOL/L (ref 3.5–5.1)
RBC # BLD: 3.28 M/UL (ref 4.63–6.08)
SODIUM BLD-SCNC: 140 MMOL/L (ref 137–145)
TOTAL PROTEIN: 6.9 G/DL (ref 6.3–8.2)
TSH SERPL DL<=0.05 MIU/L-ACNC: 6.68 UIU/ML (ref 0.27–4.2)
WBC # BLD: 9.67 K/UL (ref 4.23–9.07)

## 2022-04-25 PROCEDURE — 2580000003 HC RX 258: Performed by: INTERNAL MEDICINE

## 2022-04-25 PROCEDURE — 85025 COMPLETE CBC W/AUTO DIFF WBC: CPT

## 2022-04-25 PROCEDURE — 6360000002 HC RX W HCPCS: Performed by: INTERNAL MEDICINE

## 2022-04-25 PROCEDURE — 99214 OFFICE O/P EST MOD 30 MIN: CPT | Performed by: INTERNAL MEDICINE

## 2022-04-25 PROCEDURE — 96413 CHEMO IV INFUSION 1 HR: CPT

## 2022-04-25 PROCEDURE — 80053 COMPREHEN METABOLIC PANEL: CPT

## 2022-04-25 RX ORDER — DIPHENHYDRAMINE HCL 25 MG
TABLET ORAL
Qty: 1 TABLET | Refills: 0 | Status: SHIPPED | OUTPATIENT
Start: 2022-04-25 | End: 2022-08-08

## 2022-04-25 RX ORDER — SODIUM CHLORIDE 9 MG/ML
20 INJECTION, SOLUTION INTRAVENOUS ONCE
Status: COMPLETED | OUTPATIENT
Start: 2022-04-25 | End: 2022-04-26

## 2022-04-25 RX ORDER — SODIUM CHLORIDE 9 MG/ML
25 INJECTION, SOLUTION INTRAVENOUS PRN
Status: CANCELLED | OUTPATIENT
Start: 2022-04-25

## 2022-04-25 RX ORDER — ACETAMINOPHEN 325 MG/1
650 TABLET ORAL
Status: CANCELLED | OUTPATIENT
Start: 2022-04-25

## 2022-04-25 RX ORDER — HEPARIN SODIUM (PORCINE) LOCK FLUSH IV SOLN 100 UNIT/ML 100 UNIT/ML
500 SOLUTION INTRAVENOUS PRN
Status: CANCELLED | OUTPATIENT
Start: 2022-04-25

## 2022-04-25 RX ORDER — PREDNISONE 50 MG/1
TABLET ORAL
Qty: 3 TABLET | Refills: 0 | Status: SHIPPED | OUTPATIENT
Start: 2022-04-25 | End: 2022-08-08

## 2022-04-25 RX ORDER — SODIUM CHLORIDE 0.9 % (FLUSH) 0.9 %
5-40 SYRINGE (ML) INJECTION PRN
Status: CANCELLED | OUTPATIENT
Start: 2022-04-25

## 2022-04-25 RX ORDER — SODIUM CHLORIDE 9 MG/ML
INJECTION, SOLUTION INTRAVENOUS CONTINUOUS
Status: CANCELLED | OUTPATIENT
Start: 2022-04-25

## 2022-04-25 RX ORDER — SODIUM CHLORIDE 9 MG/ML
5-40 INJECTION INTRAVENOUS PRN
Status: CANCELLED | OUTPATIENT
Start: 2022-04-25

## 2022-04-25 RX ORDER — ALBUTEROL SULFATE 90 UG/1
4 AEROSOL, METERED RESPIRATORY (INHALATION) PRN
Status: CANCELLED | OUTPATIENT
Start: 2022-04-25

## 2022-04-25 RX ORDER — HEPARIN SODIUM (PORCINE) LOCK FLUSH IV SOLN 100 UNIT/ML 100 UNIT/ML
500 SOLUTION INTRAVENOUS PRN
Status: DISCONTINUED | OUTPATIENT
Start: 2022-04-25 | End: 2022-04-26 | Stop reason: HOSPADM

## 2022-04-25 RX ORDER — FAMOTIDINE 10 MG/ML
20 INJECTION, SOLUTION INTRAVENOUS
Status: CANCELLED | OUTPATIENT
Start: 2022-04-25

## 2022-04-25 RX ORDER — SODIUM CHLORIDE 9 MG/ML
20 INJECTION, SOLUTION INTRAVENOUS ONCE
Status: CANCELLED | OUTPATIENT
Start: 2022-04-25 | End: 2022-04-25

## 2022-04-25 RX ORDER — MEPERIDINE HYDROCHLORIDE 50 MG/ML
12.5 INJECTION INTRAMUSCULAR; INTRAVENOUS; SUBCUTANEOUS PRN
Status: CANCELLED | OUTPATIENT
Start: 2022-04-25

## 2022-04-25 RX ORDER — EPINEPHRINE 1 MG/ML
0.3 INJECTION, SOLUTION, CONCENTRATE INTRAVENOUS PRN
Status: CANCELLED | OUTPATIENT
Start: 2022-04-25

## 2022-04-25 RX ORDER — SODIUM CHLORIDE 0.9 % (FLUSH) 0.9 %
5-40 SYRINGE (ML) INJECTION PRN
Status: DISCONTINUED | OUTPATIENT
Start: 2022-04-25 | End: 2022-04-26 | Stop reason: HOSPADM

## 2022-04-25 RX ORDER — ONDANSETRON 2 MG/ML
8 INJECTION INTRAMUSCULAR; INTRAVENOUS
Status: CANCELLED | OUTPATIENT
Start: 2022-04-25

## 2022-04-25 RX ORDER — DIPHENHYDRAMINE HYDROCHLORIDE 50 MG/ML
50 INJECTION INTRAMUSCULAR; INTRAVENOUS
Status: CANCELLED | OUTPATIENT
Start: 2022-04-25

## 2022-04-25 RX ADMIN — SODIUM CHLORIDE, PRESERVATIVE FREE 10 ML: 5 INJECTION INTRAVENOUS at 11:08

## 2022-04-25 RX ADMIN — SODIUM CHLORIDE 200 MG: 9 INJECTION, SOLUTION INTRAVENOUS at 10:36

## 2022-04-25 RX ADMIN — SODIUM CHLORIDE 20 ML/HR: 9 INJECTION, SOLUTION INTRAVENOUS at 10:37

## 2022-05-04 NOTE — PROGRESS NOTES
Speech Language Pathology 90 Day Recertification Note    Patient: Chucky Jamison                                                                                     Visit Date: 2022  :     1945    Referring practitioner:    KARLA Pascal  Date of Initial Visit:          Type: THERAPY  Noted: 2022    Patient seen for 7 sessions    Visit Diagnoses:    ICD-10-CM ICD-9-CM   1. Oropharyngeal dysphagia  R13.12 787.22   2. Dysphonia  R49.0 784.42     SUBJECTIVE     Patient appeared well today. He brought his EMST-75 for instruction.     OBJECTIVE   GOALS  Goals                                            STG  Comments Status   Patient will improve oral skills to enhance safety and increase eating efficiency and bolus control as measured by improved posterior propulsion of the bolus. Patient stated that he is able to continue with his oral exercises without difficulty.  Reviewed   Patient will improve epiglottic inversion, increase base of the tongue strength and posterior pharyngeal wall excursion and increase efficiency of cough to clear residue from the airway as measured by decreased overt signs and symptoms of aspiration and decreased penetration and aspiration on repeat instrumental swallow study, if applicable.   Patient stated that he has continued with the effortful swallow and supraglottic swallow exercises.      SLP instructed patient on EMST-75, he was able to demonstrate use after instruction and completed 5 reps x 5 sets with minimal cues. He will continue this at home 1x/ day/ 5 days per week.  Reviewed.    Patient will report decreased difficulty with swallowing and improved EAT-10 score.  Patient reports no changes since last visit. He continues to present with shortness of breath due to absence of vocal closure. He was not breathing as hard today.  Reviewed.            LTG        Patient will safely consume full PO diet  "of regular consistency food and thin liquids without difficulty or complications such as aspiration or pneumonia.  Patient stated that he is eating slowly and carefully and does \"OK\".  Reviewed.         Therapy Education/Self Care    Details: POC   Given Information on EMST-75 use   Progress: New and reinforced   Education provided to:  Patient and wife   Level of understanding Verbalized and demonstrated             Total Time of Visit:             40  mins         ASSESSMENT/PLAN     ASSESSMENT: Patient able to demonstrate use of EMST-75 for swallowing exercise.     PLAN:  Continue therapy and home exercise.     Progress Note Due Date:3/23/22  Recertification Due Date:4/23/22    SIGNATURE: Bonita Cohen CCC-SLP, License #: [id number]  Electronically Signed on 5/4/2022      "

## 2022-05-10 NOTE — PROGRESS NOTES
Speech Language Pathology Treatment Note    Patient: Chucky Jamison                                                                                     Visit Date: 5/10/2022  :     1945    Referring practitioner:    KARLA Pascal  Date of Initial Visit:          Type: THERAPY  Noted: 2022    Patient seen for 8 sessions    Visit Diagnoses:    ICD-10-CM ICD-9-CM   1. Oropharyngeal dysphagia  R13.12 787.22   2. Dysphonia  R49.0 784.42     SUBJECTIVE     Patient appeared well today. He brought his EMST-75 although he had already completed his exercise with it for today. He reported being short of breath following.     OBJECTIVE   GOALS  Goals                                            STG  Comments Status   Patient will improve oral skills to enhance safety and increase eating efficiency and bolus control as measured by improved posterior propulsion of the bolus. Patient stated that he is able to continue with his oral exercises without difficulty.  Ongoing   Patient will improve epiglottic inversion, increase base of the tongue strength and posterior pharyngeal wall excursion and increase efficiency of cough to clear residue from the airway as measured by decreased overt signs and symptoms of aspiration and decreased penetration and aspiration on repeat instrumental swallow study, if applicable.   Patient able to demonstrate effortful swallow 25x.     Patient has been keeping up with his EMST exercise at home with no reported problems, he does report being some short of breath afterwards.  Ongoing   Patient will report decreased difficulty with swallowing and improved EAT-10 score.  Patient reports improving swallow. He was able to eat roast for dinner last night with no problems.  Ongoing   Patient will demonstrate improved vocal closure for voicing producing sustained voicing for >5 seconds Able to count to 10 with one breath per  number, hoarse/breathy voice, likely with false vocal fold phonation. Unable to hold sustained vowel >1 second. Unable to produce nasal /m/.  New   LTG        Patient will safely consume full PO diet of regular consistency food and thin liquids without difficulty or complications such as aspiration or pneumonia.  Patient reports improved swallow. He continues to present with shortness of breath due to absence of vocal closure.   Reviewed.         Therapy Education/Self Care    Details: POC   Given Information on EMST-75 use   Progress: New and reinforced   Education provided to:  Patient and wife   Level of understanding Verbalized and demonstrated             Total Time of Visit:             40  mins         ASSESSMENT/PLAN     ASSESSMENT: Patient able to demonstrate use of EMST-75 for swallowing exercise.     PLAN:  Continue therapy and home exercise.     Progress Note Due Date:6/5/22  Recertification Due Date:7/5/22    SIGNATURE: Bonita Cohen CCC-SLP, License #: [id number]  Electronically Signed on 5/10/2022

## 2022-05-16 ENCOUNTER — HOSPITAL ENCOUNTER (OUTPATIENT)
Dept: INFUSION THERAPY | Age: 77
Discharge: HOME | End: 2022-05-16
Payer: OTHER GOVERNMENT

## 2022-05-16 VITALS
HEIGHT: 67 IN | OXYGEN SATURATION: 95 % | WEIGHT: 141.6 LBS | TEMPERATURE: 97.7 F | HEART RATE: 73 BPM | SYSTOLIC BLOOD PRESSURE: 119 MMHG | DIASTOLIC BLOOD PRESSURE: 68 MMHG | BODY MASS INDEX: 22.22 KG/M2

## 2022-05-16 DIAGNOSIS — C34.92 SQUAMOUS CELL CARCINOMA OF LEFT LUNG (HCC): Primary | ICD-10-CM

## 2022-05-16 DIAGNOSIS — R91.8 LUNG MASS: Primary | ICD-10-CM

## 2022-05-16 DIAGNOSIS — C34.92 SQUAMOUS CELL CARCINOMA OF LEFT LUNG (HCC): ICD-10-CM

## 2022-05-16 LAB
ALBUMIN SERPL-MCNC: 4 G/DL (ref 3.5–5.2)
ALP BLD-CCNC: 74 U/L (ref 40–130)
ALT SERPL-CCNC: 40 U/L (ref 21–72)
ANION GAP SERPL CALCULATED.3IONS-SCNC: 7 MMOL/L (ref 7–19)
AST SERPL-CCNC: 37 U/L (ref 17–59)
BILIRUB SERPL-MCNC: 0.4 MG/DL (ref 0.2–1.3)
BUN BLDV-MCNC: 18 MG/DL (ref 9–20)
CALCIUM SERPL-MCNC: 9.2 MG/DL (ref 8.4–10.2)
CHLORIDE BLD-SCNC: 106 MMOL/L (ref 98–111)
CO2: 27 MMOL/L (ref 22–29)
CREAT SERPL-MCNC: 0.7 MG/DL (ref 0.6–1.2)
GFR NON-AFRICAN AMERICAN: >60
GLOBULIN: 3.2 G/DL
GLUCOSE BLD-MCNC: 89 MG/DL (ref 74–106)
HCT VFR BLD CALC: 38.7 % (ref 40.1–51)
HEMOGLOBIN: 12.1 G/DL (ref 13.7–17.5)
LYMPHOCYTES ABSOLUTE: 1.62 K/UL (ref 1.18–3.74)
LYMPHOCYTES RELATIVE PERCENT: 20.6 % (ref 19.3–53.1)
MCH RBC QN AUTO: 32.9 PG (ref 25.7–32.2)
MCHC RBC AUTO-ENTMCNC: 31.3 G/DL (ref 32.3–36.5)
MCV RBC AUTO: 105.2 FL (ref 79–92.2)
MONOCYTES ABSOLUTE: 0.92 K/UL (ref 0.24–0.82)
MONOCYTES RELATIVE PERCENT: 11.7 % (ref 4.7–12.5)
NEUTROPHILS ABSOLUTE: 5.15 K/UL (ref 1.56–6.13)
NEUTROPHILS RELATIVE PERCENT: 65.5 % (ref 34–71.1)
PDW BLD-RTO: 15.2 % (ref 11.6–14.4)
PLATELET # BLD: 172 K/UL (ref 163–337)
PMV BLD AUTO: 8.9 FL (ref 7.4–10.4)
POTASSIUM SERPL-SCNC: 3.9 MMOL/L (ref 3.5–5.1)
RBC # BLD: 3.68 M/UL (ref 4.63–6.08)
SODIUM BLD-SCNC: 140 MMOL/L (ref 137–145)
TOTAL PROTEIN: 6.7 G/DL (ref 6.3–8.2)
TSH SERPL DL<=0.05 MIU/L-ACNC: 3.65 UIU/ML (ref 0.27–4.2)
WBC # BLD: 7.86 K/UL (ref 4.23–9.07)

## 2022-05-16 PROCEDURE — 6360000002 HC RX W HCPCS: Performed by: INTERNAL MEDICINE

## 2022-05-16 PROCEDURE — 2580000003 HC RX 258: Performed by: INTERNAL MEDICINE

## 2022-05-16 PROCEDURE — 80053 COMPREHEN METABOLIC PANEL: CPT

## 2022-05-16 PROCEDURE — 85025 COMPLETE CBC W/AUTO DIFF WBC: CPT

## 2022-05-16 PROCEDURE — 96413 CHEMO IV INFUSION 1 HR: CPT

## 2022-05-16 RX ORDER — EPINEPHRINE 1 MG/ML
0.3 INJECTION, SOLUTION, CONCENTRATE INTRAVENOUS PRN
Status: CANCELLED | OUTPATIENT
Start: 2022-05-16

## 2022-05-16 RX ORDER — SODIUM CHLORIDE 0.9 % (FLUSH) 0.9 %
5-40 SYRINGE (ML) INJECTION PRN
Status: DISCONTINUED | OUTPATIENT
Start: 2022-05-16 | End: 2022-05-17 | Stop reason: HOSPADM

## 2022-05-16 RX ORDER — HEPARIN SODIUM (PORCINE) LOCK FLUSH IV SOLN 100 UNIT/ML 100 UNIT/ML
500 SOLUTION INTRAVENOUS PRN
Status: CANCELLED | OUTPATIENT
Start: 2022-05-16

## 2022-05-16 RX ORDER — SODIUM CHLORIDE 9 MG/ML
INJECTION, SOLUTION INTRAVENOUS CONTINUOUS
Status: CANCELLED | OUTPATIENT
Start: 2022-05-16

## 2022-05-16 RX ORDER — SODIUM CHLORIDE 0.9 % (FLUSH) 0.9 %
5-40 SYRINGE (ML) INJECTION PRN
Status: CANCELLED | OUTPATIENT
Start: 2022-05-16

## 2022-05-16 RX ORDER — ONDANSETRON 2 MG/ML
8 INJECTION INTRAMUSCULAR; INTRAVENOUS
Status: CANCELLED | OUTPATIENT
Start: 2022-05-16

## 2022-05-16 RX ORDER — ALBUTEROL SULFATE 90 UG/1
4 AEROSOL, METERED RESPIRATORY (INHALATION) PRN
Status: CANCELLED | OUTPATIENT
Start: 2022-05-16

## 2022-05-16 RX ORDER — SODIUM CHLORIDE 9 MG/ML
20 INJECTION, SOLUTION INTRAVENOUS ONCE
Status: DISCONTINUED | OUTPATIENT
Start: 2022-05-16 | End: 2022-05-18 | Stop reason: HOSPADM

## 2022-05-16 RX ORDER — ACETAMINOPHEN 325 MG/1
650 TABLET ORAL
Status: CANCELLED | OUTPATIENT
Start: 2022-05-16

## 2022-05-16 RX ORDER — FAMOTIDINE 10 MG/ML
20 INJECTION, SOLUTION INTRAVENOUS
Status: CANCELLED | OUTPATIENT
Start: 2022-05-16

## 2022-05-16 RX ORDER — DIPHENHYDRAMINE HYDROCHLORIDE 50 MG/ML
50 INJECTION INTRAMUSCULAR; INTRAVENOUS
Status: CANCELLED | OUTPATIENT
Start: 2022-05-16

## 2022-05-16 RX ORDER — SODIUM CHLORIDE 9 MG/ML
5-40 INJECTION INTRAVENOUS PRN
Status: CANCELLED | OUTPATIENT
Start: 2022-05-16

## 2022-05-16 RX ORDER — SODIUM CHLORIDE 9 MG/ML
20 INJECTION, SOLUTION INTRAVENOUS ONCE
Status: CANCELLED | OUTPATIENT
Start: 2022-05-16 | End: 2022-05-16

## 2022-05-16 RX ORDER — SODIUM CHLORIDE 9 MG/ML
25 INJECTION, SOLUTION INTRAVENOUS PRN
Status: CANCELLED | OUTPATIENT
Start: 2022-05-16

## 2022-05-16 RX ORDER — MEPERIDINE HYDROCHLORIDE 50 MG/ML
12.5 INJECTION INTRAMUSCULAR; INTRAVENOUS; SUBCUTANEOUS PRN
Status: CANCELLED | OUTPATIENT
Start: 2022-05-16

## 2022-05-16 RX ADMIN — SODIUM CHLORIDE 200 MG: 9 INJECTION, SOLUTION INTRAVENOUS at 10:23

## 2022-05-16 RX ADMIN — SODIUM CHLORIDE, PRESERVATIVE FREE 10 ML: 5 INJECTION INTRAVENOUS at 10:55

## 2022-05-25 NOTE — PROGRESS NOTES
YOB: 1945  Location: [facility]  Location Address: [address] [place] [address], [facility], [address] [place] [address]  Location Phone: [phone]    Chief Complaint   Patient presents with   • Follow-up       History of Present Illness  Chucky Jamison is a 76 y.o. male.  Chucky Jamison is here for follow up of ENT complaints.   He complains of hoarseness, difficulty swallowing and weakened voice. He and his wife feel as if his voice has improved somewhat since his initial surgery, but states it continues to have a gravel sound and he reports feeling as if he runs out of breath when talking.  The patient is status post Excisional biopsy of right posterior inferior cervical neck mass and Right thyroidectomy and isthmusectomy with excision of right neck mass on 21. Patient continues to have hoarseness and dysphagia. He is also having fatigue and night sweats. He started The patient was started on Keytruda and carbotaxol per Dr. Trujillo.  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    Patient has been having ongoing hoarseness, weak voice and dysphagia.   He has had several session with speech therapy for dysphagia therapy. He and his wife feel as if swallowing has improved, but his voice remains weak at times. He has not been getting choked and is able to eat various different foods at this point. He is not having cough after eating. He continues to have some difficulty with thin liquids     Study Result    Narrative & Impression   EXAMINATION: FL ESOPHAGRAM COMPLETE SINGLE-CONTRAST-     3/14/2022 12:00 PM CDT     HISTORY: ; C34.12-Malignant neoplasm of upper lobe, left bronchus or  lung; J38.01-Paralysis of vocal cords and  larynx, unilateral;  C76.0-Malignant neoplasm of head, face and neck; C19-Lrlqbfmyi neoplasm  of thyroid gland     Single contrast esophagram.     Number of fluoroscopic spot images obtained = 7.     Fluoroscopy dose in Air Kerma mGy = 21.1.     Fluoroscopy time = 1:09 minutes.     No aspiration.     Esophageal dysmotility with delayed clearance.     Prominent persistent contraction of the cricopharyngeus muscle with  indentation of the posterior wall of the upper esophagus.     No esophageal mass or stricture.     Summary:  1. Prominent persistent contraction of the cricopharyngeus muscle with  indentation of the posterior wall of the upper esophagus.  2. Esophageal dysmotility with delayed clearance.  This report was finalized on 03/14/2022 12:21 by Dr. Cyril Zavala MD.         Past Medical History:   Diagnosis Date   • Cataract    • COPD (chronic obstructive pulmonary disease) (HCC)    • Elevated cholesterol    • Emphysema lung (HCC)    • GERD (gastroesophageal reflux disease)    • Heart disease    • Hypercholesteremia    • Hypertension    • Lung cancer (HCC)    • Mass of right side of neck    • Mass of soft tissue of neck    • Prostate cancer (HCC)        Past Surgical History:   Procedure Laterality Date   • CATARACT EXTRACTION WITH INTRAOCULAR LENS IMPLANT Bilateral    • CORONARY STENT PLACEMENT     • EXCISION MASS HEAD/NECK Right 11/17/2021    Procedure: Excisional biopsy of right posterior neck mass and excisional biopsy of right anterior neck mass (related to the inferior right thyroid lobe);  Surgeon: Blari Walsh MD;  Location: [facility];  Service: ENT;  Laterality: Right;   • HERNIA REPAIR     • LUNG BIOPSY     • PROSTATE SURGERY         Outpatient Medications Marked as Taking for the 5/26/22 encounter (Office Visit) with Blair Walsh MD   Medication Sig Dispense Refill   • Aspirin 81 MG capsule aspirin     • azelastine (ASTELIN) 0.1 % nasal spray 1 spray into each nostril.     • BREO  ELLIPTA 200-25 MCG/INH aerosol powder  INHALE 1 PUFF DAILY  1   • Cholecalciferol (VITAMIN D3) 2000 UNITS capsule Take 2 tablets by mouth Daily.     • cilostazol (PLETAL) 100 MG tablet Take 100 mg by mouth 2 (Two) Times a Day. HOLD for DOS-11/17/21  1   • Cyanocobalamin (VITAMIN B12) 1000 MCG tablet controlled-release Take 1,000 mcg by mouth Daily.     • ezetimibe (ZETIA) 10 MG tablet Take 10 mg by mouth Daily.     • folic acid (FOLVITE) 1 MG tablet Take 1 mg by mouth Daily.     • gabapentin (NEURONTIN) 100 MG capsule Take 100 mg by mouth 2 (Two) Times a Day.     • gabapentin (NEURONTIN) 300 MG capsule TAKE 1 CAPSULE BY MOUTH TWICE A DAY FOR NEUROPATHY PAIN     • LORazepam (ATIVAN) 0.5 MG tablet lorazepam 0.5 mg tablet   TAKE 1 TABLET BY MOUTH EVERY 8 HOURS AS NEEDED FOR ANXIETY FOR UP TO 30 DAYS.     • megestrol (MEGACE) 40 MG/ML suspension      • metaxalone (SKELAXIN) 800 MG tablet Take 1 tablet by mouth 2 (Two) Times a Day As Needed for muscle spasms. 60 tablet 0   • metoprolol tartrate (LOPRESSOR) 25 MG tablet TAKE 1 TABLET BY MOUTH TWICE A DAY  3   • montelukast (SINGULAIR) 10 MG tablet Take 10 mg by mouth Every Night.     • multivitamin with minerals tablet tablet Take 1 tablet by mouth Daily.     • nitroglycerin (NITROSTAT) 0.4 MG SL tablet PLACE 1 TAB UNDER TONGUE EVERY 5 MIN FOR UP TO 3 DOSES FOR CHEST PAIN **SEEK ER IF NO RELIEF**  5   • omeprazole (priLOSEC) 20 MG capsule Take 20 mg by mouth 2 (Two) Times a Day.     • rosuvastatin (CRESTOR) 20 MG tablet Take 20 mg by mouth Daily.     • traMADol (ULTRAM) 50 MG tablet Take 1 tablet by mouth 3 (Three) Times a Day As Needed for moderate pain (4-6). 90 tablet 0       Azithromycin, Penicillins, and Shellfish-derived products    Family History   Problem Relation Age of Onset   • Heart disease Mother    • No Known Problems Father    • Cancer Brother    • Prostate cancer Brother        Social History     Socioeconomic History   • Marital status:    Tobacco  Use   • Smoking status: Former Smoker     Packs/day: 0.50     Types: Cigarettes     Quit date: 2022     Years since quittin.3   • Smokeless tobacco: Never Used   • Tobacco comment: quit 2-3 months ago   Vaping Use   • Vaping Use: Never used   Substance and Sexual Activity   • Alcohol use: No   • Drug use: No   • Sexual activity: Defer       Review of Systems   Constitutional: Positive for fatigue.   HENT: Positive for trouble swallowing and voice change. Negative for sore throat.    Eyes: Negative.    Respiratory: Negative.    Cardiovascular: Negative.    Gastrointestinal: Negative.    Endocrine: Negative.    Genitourinary: Negative.    Musculoskeletal: Negative.    Allergic/Immunologic: Negative.    Neurological: Negative.        Vitals:    22 0945   BP: 107/77   Pulse: 79   Temp: 98.4 °F (36.9 °C)       Body mass index is 22.71 kg/m².    Objective     Physical Exam  Vitals reviewed.   Constitutional:       Appearance: He is normal weight.   HENT:      Head: Normocephalic.      Right Ear: Hearing and external ear normal.      Left Ear: Hearing and external ear normal.      Nose: Nose normal.      Mouth/Throat:      Lips: Pink.      Mouth: Mucous membranes are moist.      Comments: Gravel consistency of voice    Neurological:      Mental Status: He is alert.         Assessment & Plan   Diagnoses and all orders for this visit:    1. 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 Pharyngoesophageal dysphagia    3. Malignant neoplasm of upper lobe of left lung (HCC)    4. Unilateral complete paralysis of vocal cord  -     Case Request; Standing  -     Comprehensive Metabolic Panel; Future  -     CBC and  Differential; Future  -     ECG 12 Lead; Future  -     XR Chest 2 View; Future  -     COVID PRE-OP / PRE-PROCEDURE SCREENING ORDER (NO ISOLATION) - Swab, Nasopharynx; Future  -     Case Request    5. Mass of soft tissue of neck    6. Thyroid carcinoma (HCC)    7. Mass of right side of neck    8. Malignant neoplasm of hilus of left lung (HCC)    9. Dysphagia, unspecified type    10. Voice hoarseness  -     Case Request; Standing  -     Comprehensive Metabolic Panel; Future  -     CBC and Differential; Future  -     ECG 12 Lead; Future  -     XR Chest 2 View; Future  -     COVID PRE-OP / PRE-PROCEDURE SCREENING ORDER (NO ISOLATION) - Swab, Nasopharynx; Future  -     Case Request    11. Dysphonia  -     Case Request; Standing  -     Comprehensive Metabolic Panel; Future  -     CBC and Differential; Future  -     ECG 12 Lead; Future  -     XR Chest 2 View; Future  -     COVID PRE-OP / PRE-PROCEDURE SCREENING ORDER (NO ISOLATION) - Swab, Nasopharynx; Future  -     Case Request    Other orders  -     Follow Anesthesia Guidelines / Standing Orders  -     Provide Patient With Instructions on NPO Status      DIRECT LARYNGOSCOPY WITH VOCAL CORD INJECTION WITH CYMETRA OR PROLARYN (N/A)  Orders Placed This Encounter   Procedures   • COVID PRE-OP / PRE-PROCEDURE SCREENING ORDER (NO ISOLATION) - Swab, Nasopharynx     Standing Status:   Future     Standing Expiration Date:   5/26/2023     Order Specific Question:   Please select your location:     Answer:   [facility]     Order Specific Question:   COVID Screening Order:     Answer:   In-House: APTIMA PANTHER, 24 HR TAT [[id number]]     Order Specific Question:   Previously tested for COVID-19?     Answer:   Yes     Order Specific Question:   Employed in healthcare setting?     Answer:   Unknown     Order Specific Question:   Symptomatic for COVID-19 as defined by CDC?     Answer:   Unknown     Order Specific Question:   Hospitalized for COVID-19?     Answer:   No     Order Specific  Question:   Admitted to ICU for COVID-19?     Answer:   No     Order Specific Question:   Resident in a congregate (group) care setting?     Answer:   Unknown     Order Specific Question:   Release to patient     Answer:   Immediate   • XR Chest 2 View     Standing Status:   Future     Standing Expiration Date:   5/26/2023     Order Specific Question:   Reason for Exam:     Answer:   preop testing   • Comprehensive Metabolic Panel     Standing Status:   Future     Standing Expiration Date:   5/26/2023     Order Specific Question:   Release to patient     Answer:   Immediate   • Follow Anesthesia Guidelines / Standing Orders   • Provide Patient With Instructions on NPO Status   • ECG 12 Lead     Standing Status:   Future     Standing Expiration Date:   5/26/2023     Order Specific Question:   Reason for Exam:     Answer:   preop testing   • CBC and Differential     Standing Status:   Future     Standing Expiration Date:   5/26/2023     Order Specific Question:   Manual Differential     Answer:   No     Order Specific Question:   Release to patient     Answer:   Immediate     No follow-ups on file.     Discussed risks/benefits of vocal cord injection patient wishes to proceed     Continue speech therapy for dysphagia therapy       Patient Instructions   CONTACT INFORMATION:  The main office phone number is [phone]. For emergencies after hours and on weekends, this number will convert over to our answering service and the on call provider will answer. Please try to keep non emergent phone calls/ questions to office hours 9am-5pm Monday through Friday.      [employer]  As an alternative, you can sign up and use the Epic MyChart system for more direct and quicker access for non emergent questions/ problems.  "[employer] allows you to send messages to your doctor, view your test results, renew your prescriptions, schedule appointments, and more. To sign up, go to [employer] and click on the  Sign Up Now link in the New User? box. Enter your [employer] Activation Code exactly as it appears below along with the last four digits of your Social Security Number and your Date of Birth () to complete the sign-up process. If you do not sign up before the expiration date, you must request a new code.     [employer] Activation Code: Activation code not generated  Current [employer] Status: Active     If you have questions, you can email [email or web address] or call [phone] to talk to our [employer] staff. Remember, [employer] is NOT to be used for urgent needs. For medical emergencies, dial 911.     IF YOU SMOKE OR USE TOBACCO PLEASE READ THE FOLLOWING:  Why is smoking bad for me?  Smoking increases the risk of heart disease, lung disease, vascular disease, stroke, and cancer. If you smoke, STOP!        IF YOU SMOKE OR USE TOBACCO PLEASE READ THE FOLLOWING:  Why is smoking bad for me?  Smoking increases the risk of heart disease, lung disease, vascular disease, stroke, and cancer. If you smoke, STOP!     For more information:  Quit Now [place]  -QUIT-NOW  [email or web address] Be aware of the signs and symptoms of recurrent head and neck cancer including neck mass, persistent or recurrent sore throat, ear pain, hemoptysis, weight loss and hoarseness. If any of these symptoms recur and or persist, call for an evaluation.      D/W patient increasing caloric intake and discussed cruciferous vegetables and protein shakes etc to maintain optimal nutrition.  The necessity of abstaining from tobacco products was also discussed particularly with respect to not smoking

## 2022-05-26 NOTE — PATIENT INSTRUCTIONS
CONTACT INFORMATION:  The main office phone number is [phone]. For emergencies after hours and on weekends, this number will convert over to our answering service and the on call provider will answer. Please try to keep non emergent phone calls/ questions to office hours 9am-5pm Monday through Friday.      [employer]  As an alternative, you can sign up and use the Epic MyChart system for more direct and quicker access for non emergent questions/ problems.  [employer] allows you to send messages to your doctor, view your test results, renew your prescriptions, schedule appointments, and more. To sign up, go to [employer] and click on the Sign Up Now link in the New User? box. Enter your tab ticketbroker Activation Code exactly as it appears below along with the last four digits of your Social Security Number and your Date of Birth () to complete the sign-up process. If you do not sign up before the expiration date, you must request a new code.     [employer]ker Activation Code: Activation code not generated  Current tab ticketbroker Status: Active     If you have questions, you can email [email or web address] or call [phone] to talk to our tab ticketbroker staff. Remember, [employer] is NOT to be used for urgent needs. For medical emergencies, dial 911.     IF YOU SMOKE OR USE TOBACCO PLEASE READ THE FOLLOWING:  Why is smoking bad for me?  Smoking increases the risk of heart disease, lung disease, vascular disease, stroke, and cancer. If you smoke, STOP!        IF YOU SMOKE OR USE TOBACCO PLEASE READ THE FOLLOWING:  Why is smoking bad for me?  Smoking increases the risk of heart disease, lung disease, vascular disease, stroke, and cancer. If you smoke, STOP!     For more information:  Quit Now [place]  -QUIT-NOW  [email or web address] Be aware of the signs and symptoms of recurrent head and neck cancer including neck mass, persistent or recurrent sore throat, ear pain, hemoptysis, weight  loss and hoarseness. If any of these symptoms recur and or persist, call for an evaluation.      D/W patient increasing caloric intake and discussed cruciferous vegetables and protein shakes etc to maintain optimal nutrition.  The necessity of abstaining from tobacco products was also discussed particularly with respect to not smoking

## 2022-05-27 ENCOUNTER — TELEPHONE (OUTPATIENT)
Dept: CARDIOLOGY CLINIC | Age: 77
End: 2022-05-27

## 2022-05-27 NOTE — TELEPHONE ENCOUNTER
Date: 6/20/22    Cardiologist: Selwyn    Procedure: direct laryngoscopy with vocal cord injection    Surgeon: Leticia Starks Office Visit: 3/17/22  Reason for office visit and medical concerns addressed at this office visit: aaa, cad, htn, hyperlipidemia, pvd    Testing Performed and Date of Service:  12/9/21 Echo  Normal left ventricular size and systolic function EF 55 to 60%   Normal LV wall thickness with transmitral Doppler consistent with mild   [grade 1] diastolic dysfunction   Normal left atrial size   Mild sclerosis of a tricuspid aortic valve with adequate cusp separation   neither stenosis or insufficiency   Normally mobile mitral valve with trace regurgitation   No significant pulmonic stenosis or insufficiency   Normal right-sided chambers with preserved RV systolic function   Mild tricuspid regurgitation with normal RVSP estimate of 32 mmHg   IVC dimension and inspiratory motion are normal consistent with normal   right atrial filling pressures   Aortic root dimensions fall within normal limits with upper normal   ascending aortic measurement of 3.3 cm   No significant pericardial effusion    Does the patient have a stent? If so, what type? None recent    Current Medications: benadryl, prednisone, megace, metoprolol, gabapentin, zetia, singulair, crestor, metaxalone, tramadol, omeprazole, aspirin, pletal    Is the patient currently taking an anticoagulant?  If so, what is the diagnosis the patient has been given to warrant the need for the anticoagulant? aspirin    Additional Notes: requesting to hold pletal and aspirin,

## 2022-05-31 PROBLEM — R49.0 DYSPHONIA: Status: ACTIVE | Noted: 2022-01-01

## 2022-05-31 PROBLEM — C76.0 SQUAMOUS CELL CARCINOMA OF HEAD AND NECK (HCC): Status: ACTIVE | Noted: 2022-01-01

## 2022-05-31 PROBLEM — R49.0 VOICE HOARSENESS: Status: ACTIVE | Noted: 2022-01-01

## 2022-06-02 NOTE — PROGRESS NOTES
MEDICAL ONCOLOGY PROGRESS NOTE    Pt Name: Geeta Fernandez  MRN: [id number]  YOB: 1945  Date of evaluation: 6/6/2022      HISTORY OF PRESENT ILLNESS:    Reason for MD visit-toxicity assessment/disease management  The patient is a pleasant 78years old male who presents today for consideration of maintenance pembrolizumab. Patient has been tolerating treatments complains of fatigue. He also complains of upper abdomen bandlike discomfort. Denies any nausea vomiting diarrhea. He has bowel movements every 2-3 days. He has gained some weight. Diagnosis  · Squamous cell carcinoma, Left lung, Nov 2021  · [id number], stage IV (left thyroid nodule, skin metastasis)  · 11/5/21-PD-L1 Expressed 35%    Treatment Summary  · 12/20/21- Initiated Carboplatin AUC 5 Paclitaxel 175mg/m2 and Keytruda    · 3/14/22 Initiated maintenance Pembrolizumab 200mg every 3 weeks    Cancer History  Asya Grandchild was first seen by me on 11/15/2021. He was referred by pulmonary at [facility] for a diagnosis of lung cancer. He developed complaints of cough. Chest x-ray was performed and showed a lung mass. Patient is a currently smoker. He lost about 25 pounds. · 9/28/21 CXR [facility]): Questionable mass density seen only on the lateral view with patchy density in left upper lobe. Further evaluation with computed tomography is recommended. COPD. · 10/4/21 CT chest [facility]): Mass lesion left hilum extending caudally along the mediastinum and inseparable from the mediastinum in this region. This is highly suspicious for malignancy. Consolidation and/or neoplastic mass left upper lobe as described above Emphysema. New right thyroid nodule, probably solid. 2 chronic cysts within the liver. · 10/22/21- PET scan [facility]): Essentially, mass in the right side of the neck of the neck base measuring 2.8 cm x 2.4 cm. SUV 18.9. Also small focus soft tissue of the back at the base of the neck on the right.   Lesion measured 9 mm. SUV 10.5. Large mass in the left hilum measures 5.1 x 4.2 cm (SUV 18.6). Extensive parenchymal abnormality with a high SUV uptake in the left upper lobe. Apical component with a small amount of cavitation measuring 2.9 x 2.6 cm with SUV 11.3.  · 11/5/21 Lung, left mainstem bronchus biopsy with touch imprint smears: Invasive keratinizing squamous cell carcinoma. Lung, fine-needle aspiration of left hilar mass, ThinPrep and cell block: Keratinizing squamous cell carcinoma. Lung, left mainstem bronchial washing, ThinPrep and cell block: Keratinizing squamous cell carcinoma. · 11/15/2021-he was first seen by me. Recommended biopsy of right neck mass and also subcutaneous nodule right upper back. Discussed with ENT, Dr. Nimesh Maxwell. · 11/17/2021 Final Diagnosis: Excisional biopsy of a metastatic nodul in the thyroid gland as well as metastatic skin nodule. Right posterior lower neck\": Metastatic keratinizing squamous cell carcinoma involving fibroadipose tissue. 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 · 11/24/2021 MRI Brain W Wo Contrast No acute intracranial abnormality, mass or acute infarction. Chronic ischemic and atrophic changes. · 12/6/2021-essentially, stage IV disease. Squamous cell carcinoma PDL 35% metastatic left upper back skin nodule consistent with squamous cell carcinoma. Left thyroid mass consistent with metastatic, cell carcinoma. Recommended frontline chemotherapy with carboplatin AUC 5, paclitaxel 175 mg/m², Keytruda every 3 weeks x4 cycles to be followed by maintenance Keytruda if stable disease. · 12/9/2021 2D Echo Normal left ventricular size and systolic function EF 55 to 60%. · 12/20/2021-initiation of palliative chemotherapy with carboplatin/Taxol/Keytruda. · 2/18/22 CT CHEST W CONTRAST A mid to lower left hilar mass is smaller in size.  Specifically,a central mass in the lower left hilum now measures 5 x 3.2 cm and previously measured 6 x 4.5 cm. Somewhat curvilinear opacities in both upper lobes, worsening on the left and developing on the right when compared to the prior study. There is an increasing area of similar opacity in the left upper lobe located slightly more superiorly now measuring 2.2 x 1.4 cm and previously measuring about 1.2 x 1.5 cm. There is another new area that has developed more inferiorly in the left upper lobe measuring 1.6 x 1 cm. There are similar appearing new developing  smewhat linear opacities in the right upper lobeGiven the curvilinear appearance and fine calcifications, progressive massive fibrosis is the leading differential. Infectious etiology cannot be ruled out, including atypical infections, such as TB. Pulmonary consultation recommended for this finding. Neoplastic disease is felt to be less likely given the appearance and bilateral upper lobe involvement. There is a stable subpleural right upper lobe nodule measuring 4-5 mm. Severe centrilobular emphysema. Atheromatous disease of the thoracic aorta and coronary arteries. No mediastinal lymphadenopathy is seen. Small densities in the trachea on the right and posteriorly are likely retained secretions. Interim right thyroidectomy. · 2/21/2022-essentially, interval response to treatment. Continue carboplatin/Taxol and Keytruda  · 3/14/22 Initiated maintenance Pembrolizumab 200mg every 3 weeks  · 3/14/2022 FL Esophagram Complete ([facility])  No esophageal mass or stricture. Prominent persistent contraction of the cricopharyngeus muscle with indentation of the posterior wall of the upper esophagus. Esophageal dysmotility with delayed clearance. · 4/13/2022 CT Chest W Contrast New ill-defined 1.6 cm spiculated pleural-based opacity in the left lower lobe medially image 94 series 4. This could be neoplastic. Infection and inflammation are also considered. Central lower left hilar area of mass effect is smaller on the current study.  Stable small right upper lobe pulmonary nodule. Worsening consolidation in the right upper lobe. This is likely infectious or inflammatory. Curvilinear opacities in the left upper lobe appear relatively stable and may also be infectious or inflammatory. Please see the neck, abdomen and pelvis CT report separately. · 4/13/2022 CT Abd/Pelvis W IV Contrast (Oral) New ill-defined 1.6 cm spiculated pleural-based opacity in the left lower lobe medially image 94 series 4. This could be neoplastic. Infection and inflammation are also considered. Central lower left hilar area of mass effect is smaller on the current study. Stable small right upper lobe pulmonary nodule. Worsening consolidation in the right upper lobe. This is likely infectious or inflammatory. Curvilinear opacities in the left upper lobe appear relatively stable and may also be infectious or inflammatory. Please see the neck, abdomen and pelvis CT report separately. · 4/13/2022 CT Soft Tissue Neck W Contrast No pathologically enlarged cervical chain or posterior triangle lymphadenopathy is present. There is asymmetry at the level of the right vocal fold with asymmetric soft tissue density and adduction of the right vocal fold. Correlation is recommended with the patient's history. Those portions of the brain which are visualized are normal in appearance. Please see previously dictated CT of the chest report for findings within both upper lobes. The right lobe of the thyroid gland is not visualized and I suspect is surgically absent. · 5/25/2022-essentially, findings of mixed response in the chest.  Continue Keytruda. Repeat CT chest in 2 months.     Past Medical History:    Past Medical History:   Diagnosis Date    CAD (coronary artery disease)     RUFINO to LCX 1/13    Hyperlipidemia     Lung cancer ([address]     Nicotine dependence, cigarettes, w unsp disorders 12/1/2017    Prostate cancer ([address]     PVD (peripheral vascular disease) ([address]     Tobacco abuse        Past Surgical History:    Past Surgical History:   Procedure Laterality Date    BRONCHOSCOPY Left 11/5/2021    BRONCHOSCOPY ENDOBRONCHIAL ULTRASOUND performed by Janet Ramirez MD at [address] [facility]   [address]  02/02/2013    EF 65%, patent stent to LCX, no new occlusive disease    CATARACT REMOVAL WITH IMPLANT Bilateral 2014    CORONARY ANGIOPLASTY  01/28/2013    RUFINO to LCX    HERNIA REPAIR      HX VASCULAR STENT  2013    LUNG BIOPSY  11/05/2021    PROSTATE SURGERY      SHOULDER SURGERY Left        Social History:    Marital status:   Smoking status:Yes; 61 years; 5-6 a day  ETOH status:No  Resides:[place], [place]    Family History:   Family History   Problem Relation Age of Onset    Hypertension Mother     Stroke Mother     Cancer Brother 61        Lung    Cancer Brother 79        Prostate       Current Hospital Medications:    Current Outpatient Medications   Medication Sig Dispense Refill    diphenhydrAMINE (BENADRYL) 25 MG tablet Take 1 hour prior to CT scans 1 tablet 0    predniSONE (DELTASONE) 50 MG tablet Take 1 tablet 24 hrs, 12 hrs and 1 hr prior to CT scans 3 tablet 0    megestrol (MEGACE) 40 MG/ML suspension Take 10 mLs by mouth daily 480 mL 5    metoprolol tartrate (LOPRESSOR) 25 MG tablet Take 1 tablet by mouth 2 times daily 180 tablet 3    gabapentin (NEURONTIN) 100 MG capsule Take 1 capsule by mouth 2 times daily for 30 days.  60 capsule 0    ondansetron (ZOFRAN) 4 MG tablet Take 1 tablet by mouth daily as needed for Nausea or Vomiting 30 tablet 0    nitroGLYCERIN (NITROSTAT) 0.4 MG SL tablet Place 1 tablet under the tongue every 5 minutes as needed for Chest pain As directed 25 tablet 5    ezetimibe (ZETIA) 10 MG tablet Take 10 mg by mouth daily      montelukast (SINGULAIR) 10 MG tablet Take 10 mg by mouth nightly      rosuvastatin (CRESTOR) 10 MG tablet Take 20 mg by mouth daily       calcium citrate (CALCITRATE) 950 MG tablet Take 1 tablet by mouth daily  metaxalone (SKELAXIN) 800 MG tablet Take 800 mg by mouth      traMADol (ULTRAM) 50 MG tablet Take 50 mg by mouth. Charmayne Pavy omeprazole (PRILOSEC) 20 MG delayed release capsule Take 20 mg by mouth daily      Multiple Minerals-Vitamins (CALCIUM & VIT D3 BONE HEALTH PO) Take by mouth      Cholecalciferol (VITAMIN D3) 2000 UNITS CAPS Take by mouth      vitamin B-12 (CYANOCOBALAMIN) 1000 MCG tablet Take 1,000 mcg by mouth daily      aspirin 81 MG tablet Take 81 mg by mouth 2 times daily      folic acid (FOLVITE) 1 MG tablet Take 1 mg by mouth daily      Fluticasone Furoate-Vilanterol (BREO ELLIPTA) 200-25 MCG/INH AEPB Inhale into the lungs      azelastine HCl 0.15 % SOLN by Nasal route      cilostazol (PLETAL) 100 MG tablet Take 100 mg by mouth 2 times daily       No current facility-administered medications for this visit. Allergies: Allergies   Allergen Reactions    Zithromax [Azithromycin]      abd pain.      Penicillins Rash     And pain      Shellfish-Derived Products Rash     Subjective     REVIEW OF SYSTEMS:   CONSTITUTIONAL: no fever, no night sweats, fatigue;  HEENT: no blurring of vision, no double vision, no hearing difficulty, no tinnitus, no ulceration, no dysplasia, no epistaxis;   LUNGS: no cough, no hemoptysis, no wheeze,  no shortness of breath;  CARDIOVASCULAR: no palpitation, no chest pain, no shortness of breath;  GI: occasional lower abdominal pain, no nausea, no vomiting, no diarrhea, no constipation;  LLUVIA: no dysuria, no hematuria, no frequency or urgency, no nephrolithiasis;  MUSCULOSKELETAL: no joint pain, no swelling, no stiffness;  ENDOCRINE: no polyuria, no polydipsia, no cold or heat intolerance;  HEMATOLOGY: no easy bruising or bleeding, no history of clotting disorder;  DERMATOLOGY: no skin rash, no eczema, no pruritus;  PSYCHIATRY: no depression, no anxiety, no panic attacks, no suicidal ideation, no homicidal ideation;  NEUROLOGY: no syncope, no seizures, no numbness or tingling of hands, no numbness or tingling of feet, no paresis;     Objective   /68 (Site: Right Upper Arm, Position: Sitting)   Pulse 87   Temp 97.9 °F (36.6 °C) (Oral)   Resp 18   Ht 5' 7\" (1.702 m)   Wt 146 lb 3.2 oz (66.3 kg)   SpO2 93%   BMI 22.90 kg/m²    Wt Readings from Last 3 Encounters:   06/06/22 146 lb 3.2 oz (66.3 kg)   05/16/22 141 lb 9.6 oz (64.2 kg)   04/25/22 138 lb (62.6 kg)        PHYSICAL EXAM:  CONSTITUTIONAL: Alert, appropriate, no acute distress  EYES: Non icteric, EOM intact, pupils equal round   ENT: Mucus membranes moist, no oral pharyngeal lesions, external inspection of ears and nose are normal.  NECK: Supple, no masses. No palpable thyroid mass  CHEST/LUNGS: CTA bilaterally, normal respiratory effort   CARDIOVASCULAR: RRR, no murmurs. No lower extremity edema  ABDOMEN: soft non-tender, active bowel sounds, no HSM. No palpable masses  EXTREMITIES: warm, full ROM in all 4 extremities, no focal weakness. SKIN: warm, dry with no rashes or lesions  LYMPH: No cervical, clavicular, axillary, or inguinal lymphadenopathy  NEUROLOGIC: follows commands, non focal   PSYCH: mood and affect appropriate.   Alert and oriented to time, place, person    LABORATORY RESULTS REVIEWED/ANALYZED BY ME:  Lab Results   Component Value Date    WBC 8.36 06/06/2022    HGB 10.7 (L) 06/06/2022    HCT 33.1 (L) 06/06/2022    .9 (H) 06/06/2022     06/06/2022     Lab Results   Component Value Date    NEUTROABS 6.10 06/06/2022     Lab Results   Component Value Date     06/06/2022    K 3.8 06/06/2022     06/06/2022    CO2 26 06/06/2022    BUN 12 06/06/2022    CREATININE 0.8 06/06/2022    GLUCOSE 112 (H) 06/06/2022    CALCIUM 9.2 06/06/2022    PROT 7.2 06/06/2022    LABALBU 3.7 06/06/2022    BILITOT 0.4 06/06/2022    ALKPHOS 65 06/06/2022    AST 39 06/06/2022    ALT 30 06/06/2022    LABGLOM >60 06/06/2022    GFRAA >60 11/24/2021    GLOB 3.5 06/06/2022         RADIOLOGY STUDIES REVIEWED BY ME:      ASSESSMENT:    Orders Placed This Encounter   Procedures    CT ABDOMEN PELVIS W IV CONTRAST Additional Contrast? Oral     Standing Status:   Future     Standing Expiration Date:   6/6/2023     Scheduling Instructions:      sched same day as 6/15 CT chest     Order Specific Question:   Additional Contrast?     Answer:   Oral     Order Specific Question:   STAT Creatinine as needed:     Answer:   No     Order Specific Question:   Reason for exam:     Answer:   COMPARE TO PREVIOUS SCANS to assess response to treatment and new onset low abdomen pain    NM BONE SCAN WHOLE BODY     Standing Status:   Future     Standing Expiration Date:   6/6/2023     Scheduling Instructions:      sched same day as CT chest on 6/15     Order Specific Question:   Reason for exam:     Answer:   COMPARE TO PREVIOUS SCANS to assess previous lumbar compression fracture see on April 2022 CT scan    CBC with Auto Differential     Standing Status:   Future     Number of Occurrences:   1     Standing Expiration Date:   6/6/2023    Comprehensive Metabolic Panel     Standing Status:   Future     Number of Occurrences:   1     Standing Expiration Date:   6/6/2023    TSH     Standing Status:   Future     Number of Occurrences:   1     Standing Expiration Date:   6/6/2023    TSH      Jose Maria Bridges was seen today for lung cancer. Diagnoses and all orders for this visit:    Squamous cell carcinoma of left lung (HCC)  -     CBC with Auto Differential; Future  -     Comprehensive Metabolic Panel; Future  -     CT ABDOMEN PELVIS W IV CONTRAST Additional Contrast? Oral; Future  -     NM BONE SCAN WHOLE BODY; Future    Fatigue due to treatment  -     TSH;  Future    Encounter for antineoplastic immunotherapy    Chemotherapy management, encounter for    Adverse effect of chemotherapy, subsequent encounter    Care plan discussed with patient    Contrast media allergy    Stage IV squamous cell carcinoma of left lung (HCC)    Antineoplastic chemotherapy induced anemia    Normocytic anemia    Other orders  -     TSH      Non-small cell lung cancer, SCC [id number] stage IV (left thyroid, skin subcutaneous nodule) PDL 1 35%  Essentially, findings of squamous cell non-small cell lung cancer left lung with skin metastasis/left thyroid metastasis  Reviewed pathology skin nodule/left thyroid mass which were both consistent with metastatic squamous cell carcinoma. Recommended:  · Carboplatin AUC 5  · Paclitaxel 175 mg/m²  · Keytruda 200mg IV   X4 cycles  every 3 weeks  To be followed by maintenance Keytruda 200 mg IV every 3 weeks  Discussed at length about logistics/side effects of treatment. -Repeat CT soft tissue neck, chest, abdomen pelvis on 4/13/2022 showed findings of mixed response in the chest.    Continue maintenance pembrolizumab only. -Repeat CT chest, abdomen pelvis and bone scan around mid June 2022. Chemotherapy related adverse events-fatigue, myalgia, arthralgia, nausea, insomnia, joint pain     Headaches- resolved. MRI brain was unremarkable. Cancer related anxiety-Recommend Ativan 0.5mg as needed    Fatigue-  TSH, 3.65  Cortisol 15.9    Insomnia-try melatonin 5 mg and Benadryl 25 mg at bedtime      PLAN:  · RTC with MD 3 weeks in treatment room  · Continue maintenance with pembrolizumab every 3 weeks  · CBC/CMP/TSH level today   · Continue Megace suspension 400mg daily  · CT chest, abdomen, pelvis 6/15/22  · Bone scan same day as CT scans  · CT scan pre-meds given  · Continue follow-up with \[facility]\"" speech therapy   · Continue follow-up with Dr. Mauricio Wick/[facility]  · Continue Zofran 4mg for nausea  · Continue  OTC Benadryl and/or Melatonin for sleep  · Continue Ativan 0.5mg every 8 hrs as needed       Tenzin WALSH am pre charting  as Medical Assistant for Manjeet Dougherty MD. Electronically signed by Tenzin Singer MA on 6/6/2022 at 4:53 PM CDT.     Sasha WALSH am scribing for Manjeet Dougherty MD. Electronically signed by Lukas Moran RN on 6/6/2022 at 11:05 AM CDT. I, Dr Paige Christopher, personally performed the services described in this documentation as scribed by Lukas Moran RN in my presence and is both accurate and complete. I have seen, examined and reviewed this patient medication list, appropriate labs and imaging studies. I reviewed relevant medical records and others physicians notes. I discussed the plans of care with the patient. I answered all the questions to the patients satisfaction. I have also reviewed the chief complaint (CC) and part of the history (History of Present Illness (HPI), Past Family Social History [facility]), or Review of Systems (ROS) and made changes when appropriated.        (Please note that portions of this note were completed with a voice recognition program. Efforts were made to edit the dictations but occasionally words are mis-transcribed.)    Electronically signed by Leopold Georgis, MD on 6/6/2022 at 12:36 PM

## 2022-06-02 NOTE — TELEPHONE ENCOUNTER
----- Message from Nuris Barba RN sent at 5/26/2022 10:16 AM CDT -----  CLEARANCE ON ASA FROM DR MACEDO AND PLETAL BY FERMIN MEZA

## 2022-06-06 ENCOUNTER — OFFICE VISIT (OUTPATIENT)
Dept: HEMATOLOGY | Age: 77
End: 2022-06-06
Payer: OTHER GOVERNMENT

## 2022-06-06 ENCOUNTER — HOSPITAL ENCOUNTER (OUTPATIENT)
Dept: INFUSION THERAPY | Age: 77
Discharge: HOME | End: 2022-06-06
Payer: OTHER GOVERNMENT

## 2022-06-06 VITALS
WEIGHT: 146.2 LBS | SYSTOLIC BLOOD PRESSURE: 111 MMHG | TEMPERATURE: 97.9 F | DIASTOLIC BLOOD PRESSURE: 68 MMHG | OXYGEN SATURATION: 93 % | HEART RATE: 87 BPM | HEIGHT: 67 IN | BODY MASS INDEX: 22.95 KG/M2 | RESPIRATION RATE: 18 BRPM

## 2022-06-06 DIAGNOSIS — T45.1X5D ADVERSE EFFECT OF CHEMOTHERAPY, SUBSEQUENT ENCOUNTER: ICD-10-CM

## 2022-06-06 DIAGNOSIS — R53.83 FATIGUE DUE TO TREATMENT: ICD-10-CM

## 2022-06-06 DIAGNOSIS — Z51.11 CHEMOTHERAPY MANAGEMENT, ENCOUNTER FOR: ICD-10-CM

## 2022-06-06 DIAGNOSIS — C34.92 SQUAMOUS CELL CARCINOMA OF LEFT LUNG (HCC): Primary | ICD-10-CM

## 2022-06-06 DIAGNOSIS — T45.1X5A ANTINEOPLASTIC CHEMOTHERAPY INDUCED ANEMIA: ICD-10-CM

## 2022-06-06 DIAGNOSIS — Z51.12 ENCOUNTER FOR ANTINEOPLASTIC IMMUNOTHERAPY: ICD-10-CM

## 2022-06-06 DIAGNOSIS — D64.9 NORMOCYTIC ANEMIA: ICD-10-CM

## 2022-06-06 DIAGNOSIS — C34.92 SQUAMOUS CELL CARCINOMA OF LEFT LUNG (HCC): ICD-10-CM

## 2022-06-06 DIAGNOSIS — C34.92 STAGE IV SQUAMOUS CELL CARCINOMA OF LEFT LUNG (HCC): ICD-10-CM

## 2022-06-06 DIAGNOSIS — Z71.89 CARE PLAN DISCUSSED WITH PATIENT: ICD-10-CM

## 2022-06-06 DIAGNOSIS — Z91.041 CONTRAST MEDIA ALLERGY: ICD-10-CM

## 2022-06-06 DIAGNOSIS — D64.81 ANTINEOPLASTIC CHEMOTHERAPY INDUCED ANEMIA: ICD-10-CM

## 2022-06-06 DIAGNOSIS — R53.83 FATIGUE DUE TO TREATMENT: Primary | ICD-10-CM

## 2022-06-06 LAB
ALBUMIN SERPL-MCNC: 3.7 G/DL (ref 3.5–5.2)
ALP BLD-CCNC: 65 U/L (ref 40–130)
ALT SERPL-CCNC: 30 U/L (ref 21–72)
ANION GAP SERPL CALCULATED.3IONS-SCNC: 8 MMOL/L (ref 7–19)
AST SERPL-CCNC: 39 U/L (ref 17–59)
BILIRUB SERPL-MCNC: 0.4 MG/DL (ref 0.2–1.3)
BUN BLDV-MCNC: 12 MG/DL (ref 9–20)
CALCIUM SERPL-MCNC: 9.2 MG/DL (ref 8.4–10.2)
CHLORIDE BLD-SCNC: 104 MMOL/L (ref 98–111)
CO2: 26 MMOL/L (ref 22–29)
CREAT SERPL-MCNC: 0.8 MG/DL (ref 0.6–1.2)
GFR NON-AFRICAN AMERICAN: >60
GLOBULIN: 3.5 G/DL
GLUCOSE BLD-MCNC: 112 MG/DL (ref 74–106)
HCT VFR BLD CALC: 33.1 % (ref 40.1–51)
HEMOGLOBIN: 10.7 G/DL (ref 13.7–17.5)
LYMPHOCYTES ABSOLUTE: 1.21 K/UL (ref 1.18–3.74)
LYMPHOCYTES RELATIVE PERCENT: 14.5 % (ref 19.3–53.1)
MCH RBC QN AUTO: 32.6 PG (ref 25.7–32.2)
MCHC RBC AUTO-ENTMCNC: 32.3 G/DL (ref 32.3–36.5)
MCV RBC AUTO: 100.9 FL (ref 79–92.2)
MONOCYTES ABSOLUTE: 0.83 K/UL (ref 0.24–0.82)
MONOCYTES RELATIVE PERCENT: 9.9 % (ref 4.7–12.5)
NEUTROPHILS ABSOLUTE: 6.1 K/UL (ref 1.56–6.13)
NEUTROPHILS RELATIVE PERCENT: 73 % (ref 34–71.1)
PDW BLD-RTO: 14.7 % (ref 11.6–14.4)
PLATELET # BLD: 205 K/UL (ref 163–337)
PMV BLD AUTO: 9.2 FL (ref 7.4–10.4)
POTASSIUM SERPL-SCNC: 3.8 MMOL/L (ref 3.5–5.1)
RBC # BLD: 3.28 M/UL (ref 4.63–6.08)
SODIUM BLD-SCNC: 138 MMOL/L (ref 137–145)
TOTAL PROTEIN: 7.2 G/DL (ref 6.3–8.2)
TSH SERPL DL<=0.05 MIU/L-ACNC: 2.59 UIU/ML (ref 0.27–4.2)
WBC # BLD: 8.36 K/UL (ref 4.23–9.07)

## 2022-06-06 PROCEDURE — 85025 COMPLETE CBC W/AUTO DIFF WBC: CPT

## 2022-06-06 PROCEDURE — 2580000003 HC RX 258: Performed by: INTERNAL MEDICINE

## 2022-06-06 PROCEDURE — 36415 COLL VENOUS BLD VENIPUNCTURE: CPT | Performed by: INTERNAL MEDICINE

## 2022-06-06 PROCEDURE — 80053 COMPREHEN METABOLIC PANEL: CPT

## 2022-06-06 PROCEDURE — 96413 CHEMO IV INFUSION 1 HR: CPT

## 2022-06-06 PROCEDURE — 99214 OFFICE O/P EST MOD 30 MIN: CPT | Performed by: INTERNAL MEDICINE

## 2022-06-06 PROCEDURE — 6360000002 HC RX W HCPCS: Performed by: INTERNAL MEDICINE

## 2022-06-06 PROCEDURE — 1123F ACP DISCUSS/DSCN MKR DOCD: CPT | Performed by: INTERNAL MEDICINE

## 2022-06-06 RX ORDER — ALBUTEROL SULFATE 90 UG/1
4 AEROSOL, METERED RESPIRATORY (INHALATION) PRN
Status: CANCELLED | OUTPATIENT
Start: 2022-06-06

## 2022-06-06 RX ORDER — SODIUM CHLORIDE 9 MG/ML
20 INJECTION, SOLUTION INTRAVENOUS ONCE
Status: CANCELLED | OUTPATIENT
Start: 2022-06-06 | End: 2022-06-06

## 2022-06-06 RX ORDER — SODIUM CHLORIDE 9 MG/ML
5-40 INJECTION INTRAVENOUS PRN
Status: CANCELLED | OUTPATIENT
Start: 2022-06-06

## 2022-06-06 RX ORDER — ONDANSETRON 2 MG/ML
8 INJECTION INTRAMUSCULAR; INTRAVENOUS
Status: CANCELLED | OUTPATIENT
Start: 2022-06-06

## 2022-06-06 RX ORDER — SODIUM CHLORIDE 9 MG/ML
INJECTION, SOLUTION INTRAVENOUS CONTINUOUS
Status: CANCELLED | OUTPATIENT
Start: 2022-06-06

## 2022-06-06 RX ORDER — DIPHENHYDRAMINE HYDROCHLORIDE 50 MG/ML
50 INJECTION INTRAMUSCULAR; INTRAVENOUS
Status: CANCELLED | OUTPATIENT
Start: 2022-06-06

## 2022-06-06 RX ORDER — FAMOTIDINE 10 MG/ML
20 INJECTION, SOLUTION INTRAVENOUS
Status: CANCELLED | OUTPATIENT
Start: 2022-06-06

## 2022-06-06 RX ORDER — SODIUM CHLORIDE 9 MG/ML
25 INJECTION, SOLUTION INTRAVENOUS PRN
Status: CANCELLED | OUTPATIENT
Start: 2022-06-06

## 2022-06-06 RX ORDER — MEPERIDINE HYDROCHLORIDE 50 MG/ML
12.5 INJECTION INTRAMUSCULAR; INTRAVENOUS; SUBCUTANEOUS PRN
Status: CANCELLED | OUTPATIENT
Start: 2022-06-06

## 2022-06-06 RX ORDER — HEPARIN SODIUM (PORCINE) LOCK FLUSH IV SOLN 100 UNIT/ML 100 UNIT/ML
500 SOLUTION INTRAVENOUS PRN
Status: CANCELLED | OUTPATIENT
Start: 2022-06-06

## 2022-06-06 RX ORDER — EPINEPHRINE 1 MG/ML
0.3 INJECTION, SOLUTION, CONCENTRATE INTRAVENOUS PRN
Status: CANCELLED | OUTPATIENT
Start: 2022-06-06

## 2022-06-06 RX ORDER — ACETAMINOPHEN 325 MG/1
650 TABLET ORAL
Status: CANCELLED | OUTPATIENT
Start: 2022-06-06

## 2022-06-06 RX ORDER — SODIUM CHLORIDE 0.9 % (FLUSH) 0.9 %
5-40 SYRINGE (ML) INJECTION PRN
Status: CANCELLED | OUTPATIENT
Start: 2022-06-06

## 2022-06-06 RX ADMIN — SODIUM CHLORIDE 200 MG: 9 INJECTION, SOLUTION INTRAVENOUS at 10:30

## 2022-06-07 NOTE — PROGRESS NOTES
Speech Language Pathology 30 Day Progress Note    Patient: Chucky Jamison                                                                                     Visit Date: 2022  :     1945    Referring practitioner:    KARLA Pascal  Date of Initial Visit:          Type: THERAPY  Noted: 2022    Patient seen for 9 sessions    Visit Diagnoses:    ICD-10-CM ICD-9-CM   1. Oropharyngeal dysphagia  R13.12 787.22   2. Dysphonia  R49.0 784.42     SUBJECTIVE     Patient appeared well today. He stated that he is tolerating his chemo treatments better now. He has seen Dr. Walsh and has VF injection scheduled for 22.     OBJECTIVE   GOALS  Goals                                            STG  Comments Status   Patient will improve oral skills to enhance safety and increase eating efficiency and bolus control as measured by improved posterior propulsion of the bolus. Patient stated that he is able to continue with his oral exercises without difficulty. Able to demonstrate appropriately.  Met   Patient will improve epiglottic inversion, increase base of the tongue strength and posterior pharyngeal wall excursion and increase efficiency of cough to clear residue from the airway as measured by decreased overt signs and symptoms of aspiration and decreased penetration and aspiration on repeat instrumental swallow study, if applicable.  Patient able to demonstrate effortful swallow 25x. No overt s/s of aspiration with water swallows.     Patient has been keeping up with his EMST exercise at home with no reported problems. Met   Patient will report decreased difficulty with swallowing and improved EAT-10 score.  Patient reports improving swallow with no notable problems Ongoing   Patient will demonstrate improved vocal closure for voicing producing sustained voicing for >5 seconds Able to count to 10 with one breath per number, hoarse/breathy  voice, likely with false vocal fold phonation. Unable to hold sustained vowel >1 second. Unable to produce nasal /m/.  Ongoing   LTG        Patient will safely consume full PO diet of regular consistency food and thin liquids without difficulty or complications such as aspiration or pneumonia.  Patient reports improved swallow. He continues to present with shortness of breath due to absence of vocal closure.   Reviewed.         Therapy Education/Self Care    Details: POC   Given EMST maintenance interval tracking sheets   Progress: New and reinforced   Education provided to:  Patient and wife   Level of understanding Verbalized and demonstrated             Total Time of Visit:             40  mins         ASSESSMENT/PLAN     ASSESSMENT: Patient able to demonstrate effortful swallow and use of EMST-75 for swallowing exercise. No overt s/s of aspiration with water swallows.     PLAN:  Continue swallowing exercises on maintenance interval. Patient to have VF injection for vocal closure with Dr. Walsh. Follow up for voice 6/30    Progress Note Due Date:7/5/22  Recertification Due Date:7/5/22    SIGNATURE: Bonita Cohen CCC-SLP, License #: [id number]  Electronically Signed on 6/7/2022

## 2022-06-15 ENCOUNTER — HOSPITAL ENCOUNTER (OUTPATIENT)
Dept: CT IMAGING | Age: 77
Discharge: HOME | End: 2022-06-15
Payer: OTHER GOVERNMENT

## 2022-06-15 DIAGNOSIS — C34.92 SQUAMOUS CELL CARCINOMA OF LEFT LUNG (HCC): ICD-10-CM

## 2022-06-15 PROCEDURE — 71260 CT THORAX DX C+: CPT | Performed by: RADIOLOGY

## 2022-06-15 PROCEDURE — 74177 CT ABD & PELVIS W/CONTRAST: CPT

## 2022-06-15 PROCEDURE — 6360000004 HC RX CONTRAST MEDICATION: Performed by: INTERNAL MEDICINE

## 2022-06-15 PROCEDURE — 74177 CT ABD & PELVIS W/CONTRAST: CPT | Performed by: RADIOLOGY

## 2022-06-15 PROCEDURE — 71260 CT THORAX DX C+: CPT

## 2022-06-15 RX ADMIN — IOPAMIDOL 75 ML: 755 INJECTION, SOLUTION INTRAVENOUS at 10:26

## 2022-06-16 ENCOUNTER — OFFICE VISIT (OUTPATIENT)
Dept: PULMONOLOGY | Age: 77
End: 2022-06-16
Payer: OTHER GOVERNMENT

## 2022-06-16 ENCOUNTER — TELEPHONE (OUTPATIENT)
Dept: PULMONOLOGY | Age: 77
End: 2022-06-16

## 2022-06-16 VITALS
DIASTOLIC BLOOD PRESSURE: 64 MMHG | OXYGEN SATURATION: 100 % | SYSTOLIC BLOOD PRESSURE: 108 MMHG | HEART RATE: 65 BPM | BODY MASS INDEX: 23.1 KG/M2 | HEIGHT: 67 IN | WEIGHT: 147.2 LBS

## 2022-06-16 DIAGNOSIS — R49.0 HOARSENESS: ICD-10-CM

## 2022-06-16 DIAGNOSIS — C34.92 SQUAMOUS CELL CARCINOMA OF LEFT LUNG (HCC): Primary | ICD-10-CM

## 2022-06-16 DIAGNOSIS — R06.09 DYSPNEA ON EFFORT: ICD-10-CM

## 2022-06-16 DIAGNOSIS — R05.9 COUGH: ICD-10-CM

## 2022-06-16 PROBLEM — C34.90 LUNG CANCER (HCC): Status: ACTIVE | Noted: 2022-06-16

## 2022-06-16 PROCEDURE — 1123F ACP DISCUSS/DSCN MKR DOCD: CPT | Performed by: INTERNAL MEDICINE

## 2022-06-16 PROCEDURE — 99214 OFFICE O/P EST MOD 30 MIN: CPT | Performed by: INTERNAL MEDICINE

## 2022-06-16 RX ORDER — LANOLIN ALCOHOL/MO/W.PET/CERES
3 CREAM (GRAM) TOPICAL NIGHTLY PRN
COMMUNITY

## 2022-06-16 NOTE — PROGRESS NOTES
Pulmonary and Sleep Medicine    Abdelrahman Zaidi (:  1945) is a 78 y.o. male,Established patient, here for evaluation of the following chief complaint(s):  Follow-up (Pt came in today for a follow up.)      Referring physician:  No referring provider defined for this encounter. ASSESSMENT/PLAN:  1. Squamous cell carcinoma of left lung ([address]  -     Case Request  2. Dyspnea on effort  3. Hoarseness  4. Cough        Recurrent atelectasis of the left lower lobe. The patient showed initial response to chemotherapy and Keytruda. There was significant decrease in the size of the left hilar mass. However the patient developed some interstitial findings and upper lobes with consolidation in the right upper lobe. That improved on subsequent imaging but did not completely resolve. Question if the changes are related to [place]. The left lower lobe atelectasis is concerning however for possible recurrent endobronchial tumor. We will proceed with bronchoscopy to evaluate the airway. We will obtain transbronchial biopsies. Discussed bronchoscopy with the patient. He is in agreement. Davis Villela [facility], [facility]    Return in about 2 weeks (around 2022). SUBJECTIVE/OBJECTIVE:  The patient is here for follow-up after his a CT of the chest.  He underwent a CT for follow-up yesterday. Continues to be hoarse. Denies any hemoptysis. CT of the chest shows recurrent left lower lobe superior segment atelectasis. He continues to have bilateral new interstitial findings. Prior to Visit Medications    Medication Sig Taking?  Authorizing Provider   melatonin 3 MG TABS tablet Take 3 mg by mouth nightly as needed Yes Historical Provider, MD   diphenhydrAMINE (BENADRYL) 25 MG tablet Take 1 hour prior to CT scans Yes Steve Seen, MD   predniSONE (DELTASONE) 50 MG tablet Take 1 tablet 24 hrs, 12 hrs and 1 hr prior to CT scans Yes Steve Seen, MD   megestrol (MEGACE) 40 MG/ML suspension Take 10 mLs by mouth daily Yes Akira Maradiaga MD   metoprolol tartrate (LOPRESSOR) 25 MG tablet Take 1 tablet by mouth 2 times daily Yes DIVYA Montesinos   ondansetron (ZOFRAN) 4 MG tablet Take 1 tablet by mouth daily as needed for Nausea or Vomiting Yes Akira Maradiaga MD   nitroGLYCERIN (NITROSTAT) 0.4 MG SL tablet Place 1 tablet under the tongue every 5 minutes as needed for Chest pain As directed Yes DIVYA Sawyer - NP   ezetimibe (ZETIA) 10 MG tablet Take 10 mg by mouth daily Yes Historical Provider, MD   montelukast (SINGULAIR) 10 MG tablet Take 10 mg by mouth nightly Yes Historical Provider, MD   rosuvastatin (CRESTOR) 10 MG tablet Take 20 mg by mouth daily  Yes Historical Provider, MD   calcium citrate (CALCITRATE) 950 MG tablet Take 1 tablet by mouth daily Yes Historical Provider, MD   metaxalone (SKELAXIN) 800 MG tablet Take 800 mg by mouth Yes Historical Provider, MD   traMADol (ULTRAM) 50 MG tablet Take 50 mg by mouth. . Yes Historical Provider, MD   omeprazole (PRILOSEC) 20 MG delayed release capsule Take 20 mg by mouth daily Yes Historical Provider, MD   Multiple Minerals-Vitamins (CALCIUM & VIT D3 BONE HEALTH PO) Take by mouth Yes Historical Provider, MD   Cholecalciferol (VITAMIN D3) 2000 UNITS CAPS Take by mouth Yes Historical Provider, MD   vitamin B-12 (CYANOCOBALAMIN) 1000 MCG tablet Take 1,000 mcg by mouth daily Yes Historical Provider, MD   aspirin 81 MG tablet Take 81 mg by mouth 2 times daily Yes Historical Provider, MD   folic acid (FOLVITE) 1 MG tablet Take 1 mg by mouth daily Yes Historical Provider, MD   Fluticasone Furoate-Vilanterol (BREO ELLIPTA) 200-25 MCG/INH AEPB Inhale into the lungs Yes Historical Provider, MD   azelastine HCl 0.15 % SOLN by Nasal route Yes Historical Provider, MD   cilostazol (PLETAL) 100 MG tablet Take 100 mg by mouth 2 times daily Yes Historical Provider, MD   gabapentin (NEURONTIN) 100 MG capsule Take 1 capsule by mouth 2 times

## 2022-06-16 NOTE — TELEPHONE ENCOUNTER
Spoke with Mohini at [facility]. There is no [facility] Auth on file for patient's pulmonology services. [facility] scheduled for 6/17/22 will need to be billed thru personal insurance.   If patient wants pulmonology services to be billed thru [place] they will need to get PCP to send a RFS to [place] for approval

## 2022-06-17 ENCOUNTER — ANESTHESIA EVENT (OUTPATIENT)
Dept: ENDOSCOPY | Age: 77
End: 2022-06-17
Payer: OTHER GOVERNMENT

## 2022-06-17 ENCOUNTER — PREP FOR PROCEDURE (OUTPATIENT)
Dept: PULMONOLOGY | Age: 77
End: 2022-06-17

## 2022-06-17 ENCOUNTER — ANESTHESIA (OUTPATIENT)
Dept: ENDOSCOPY | Age: 77
End: 2022-06-17
Payer: OTHER GOVERNMENT

## 2022-06-17 ENCOUNTER — HOSPITAL ENCOUNTER (OUTPATIENT)
Age: 77
Setting detail: OUTPATIENT SURGERY
Discharge: HOME | End: 2022-06-17
Attending: INTERNAL MEDICINE | Admitting: INTERNAL MEDICINE
Payer: OTHER GOVERNMENT

## 2022-06-17 VITALS
HEIGHT: 67 IN | BODY MASS INDEX: 23.07 KG/M2 | OXYGEN SATURATION: 98 % | SYSTOLIC BLOOD PRESSURE: 127 MMHG | TEMPERATURE: 97.1 F | RESPIRATION RATE: 16 BRPM | DIASTOLIC BLOOD PRESSURE: 68 MMHG | HEART RATE: 56 BPM | WEIGHT: 147 LBS

## 2022-06-17 DIAGNOSIS — C34.90 LUNG CANCER (HCC): ICD-10-CM

## 2022-06-17 PROCEDURE — 7100000011 HC PHASE II RECOVERY - ADDTL 15 MIN: Performed by: INTERNAL MEDICINE

## 2022-06-17 PROCEDURE — 7100000010 HC PHASE II RECOVERY - FIRST 15 MIN: Performed by: INTERNAL MEDICINE

## 2022-06-17 PROCEDURE — 3700000000 HC ANESTHESIA ATTENDED CARE: Performed by: INTERNAL MEDICINE

## 2022-06-17 PROCEDURE — 2500000003 HC RX 250 WO HCPCS

## 2022-06-17 PROCEDURE — 88112 CYTOPATH CELL ENHANCE TECH: CPT

## 2022-06-17 PROCEDURE — 31625 BRONCHOSCOPY W/BIOPSY(S): CPT | Performed by: INTERNAL MEDICINE

## 2022-06-17 PROCEDURE — 31624 DX BRONCHOSCOPE/LAVAGE: CPT | Performed by: INTERNAL MEDICINE

## 2022-06-17 PROCEDURE — 2709999900 HC NON-CHARGEABLE SUPPLY: Performed by: INTERNAL MEDICINE

## 2022-06-17 PROCEDURE — 88305 TISSUE EXAM BY PATHOLOGIST: CPT

## 2022-06-17 PROCEDURE — 7100000000 HC PACU RECOVERY - FIRST 15 MIN: Performed by: INTERNAL MEDICINE

## 2022-06-17 PROCEDURE — 6360000002 HC RX W HCPCS

## 2022-06-17 PROCEDURE — 3609027000 HC BRONCHOSCOPY: Performed by: INTERNAL MEDICINE

## 2022-06-17 PROCEDURE — 2580000003 HC RX 258: Performed by: INTERNAL MEDICINE

## 2022-06-17 PROCEDURE — 3700000001 HC ADD 15 MINUTES (ANESTHESIA): Performed by: INTERNAL MEDICINE

## 2022-06-17 PROCEDURE — 7100000001 HC PACU RECOVERY - ADDTL 15 MIN: Performed by: INTERNAL MEDICINE

## 2022-06-17 RX ORDER — DIPHENHYDRAMINE HYDROCHLORIDE 50 MG/ML
12.5 INJECTION INTRAMUSCULAR; INTRAVENOUS
Status: DISCONTINUED | OUTPATIENT
Start: 2022-06-17 | End: 2022-06-17 | Stop reason: HOSPADM

## 2022-06-17 RX ORDER — DEXAMETHASONE SODIUM PHOSPHATE 10 MG/ML
INJECTION, SOLUTION INTRAMUSCULAR; INTRAVENOUS PRN
Status: DISCONTINUED | OUTPATIENT
Start: 2022-06-17 | End: 2022-06-17 | Stop reason: SDUPTHER

## 2022-06-17 RX ORDER — SODIUM CHLORIDE 9 MG/ML
INJECTION, SOLUTION INTRAVENOUS PRN
Status: DISCONTINUED | OUTPATIENT
Start: 2022-06-17 | End: 2022-06-17 | Stop reason: HOSPADM

## 2022-06-17 RX ORDER — LIDOCAINE HYDROCHLORIDE 10 MG/ML
INJECTION, SOLUTION EPIDURAL; INFILTRATION; INTRACAUDAL; PERINEURAL PRN
Status: DISCONTINUED | OUTPATIENT
Start: 2022-06-17 | End: 2022-06-17 | Stop reason: SDUPTHER

## 2022-06-17 RX ORDER — SODIUM CHLORIDE, SODIUM LACTATE, POTASSIUM CHLORIDE, CALCIUM CHLORIDE 600; 310; 30; 20 MG/100ML; MG/100ML; MG/100ML; MG/100ML
INJECTION, SOLUTION INTRAVENOUS CONTINUOUS
Status: DISCONTINUED | OUTPATIENT
Start: 2022-06-17 | End: 2022-06-17 | Stop reason: HOSPADM

## 2022-06-17 RX ORDER — FENTANYL CITRATE 50 UG/ML
INJECTION, SOLUTION INTRAMUSCULAR; INTRAVENOUS PRN
Status: DISCONTINUED | OUTPATIENT
Start: 2022-06-17 | End: 2022-06-17 | Stop reason: SDUPTHER

## 2022-06-17 RX ORDER — SODIUM CHLORIDE 0.9 % (FLUSH) 0.9 %
5-40 SYRINGE (ML) INJECTION PRN
Status: DISCONTINUED | OUTPATIENT
Start: 2022-06-17 | End: 2022-06-17 | Stop reason: HOSPADM

## 2022-06-17 RX ORDER — SODIUM CHLORIDE 0.9 % (FLUSH) 0.9 %
5-40 SYRINGE (ML) INJECTION EVERY 12 HOURS SCHEDULED
Status: DISCONTINUED | OUTPATIENT
Start: 2022-06-17 | End: 2022-06-17 | Stop reason: HOSPADM

## 2022-06-17 RX ORDER — ONDANSETRON 2 MG/ML
INJECTION INTRAMUSCULAR; INTRAVENOUS PRN
Status: DISCONTINUED | OUTPATIENT
Start: 2022-06-17 | End: 2022-06-17 | Stop reason: SDUPTHER

## 2022-06-17 RX ORDER — PROPOFOL 10 MG/ML
INJECTION, EMULSION INTRAVENOUS PRN
Status: DISCONTINUED | OUTPATIENT
Start: 2022-06-17 | End: 2022-06-17 | Stop reason: SDUPTHER

## 2022-06-17 RX ORDER — SUCCINYLCHOLINE CHLORIDE 20 MG/ML
INJECTION INTRAMUSCULAR; INTRAVENOUS PRN
Status: DISCONTINUED | OUTPATIENT
Start: 2022-06-17 | End: 2022-06-17 | Stop reason: SDUPTHER

## 2022-06-17 RX ORDER — METOCLOPRAMIDE HYDROCHLORIDE 5 MG/ML
10 INJECTION INTRAMUSCULAR; INTRAVENOUS
Status: DISCONTINUED | OUTPATIENT
Start: 2022-06-17 | End: 2022-06-17 | Stop reason: HOSPADM

## 2022-06-17 RX ADMIN — PROPOFOL 30 MG: 10 INJECTION, EMULSION INTRAVENOUS at 10:15

## 2022-06-17 RX ADMIN — SUCCINYLCHOLINE CHLORIDE 120 MG: 20 INJECTION, SOLUTION INTRAMUSCULAR; INTRAVENOUS at 10:07

## 2022-06-17 RX ADMIN — ONDANSETRON 4 MG: 2 INJECTION INTRAMUSCULAR; INTRAVENOUS at 10:07

## 2022-06-17 RX ADMIN — SODIUM CHLORIDE, POTASSIUM CHLORIDE, SODIUM LACTATE AND CALCIUM CHLORIDE: 600; 310; 30; 20 INJECTION, SOLUTION INTRAVENOUS at 09:26

## 2022-06-17 RX ADMIN — LIDOCAINE HYDROCHLORIDE 50 MG: 10 INJECTION, SOLUTION EPIDURAL; INFILTRATION; INTRACAUDAL; PERINEURAL at 10:07

## 2022-06-17 RX ADMIN — DEXAMETHASONE SODIUM PHOSPHATE 10 MG: 10 INJECTION, SOLUTION INTRAMUSCULAR; INTRAVENOUS at 10:07

## 2022-06-17 RX ADMIN — FENTANYL CITRATE 100 MCG: 50 INJECTION INTRAMUSCULAR; INTRAVENOUS at 10:07

## 2022-06-17 RX ADMIN — PROPOFOL 120 MG: 10 INJECTION, EMULSION INTRAVENOUS at 10:07

## 2022-06-17 ASSESSMENT — PAIN - FUNCTIONAL ASSESSMENT: PAIN_FUNCTIONAL_ASSESSMENT: 0-10

## 2022-06-17 ASSESSMENT — PAIN SCALES - GENERAL
PAINLEVEL_OUTOF10: 0
PAINLEVEL_OUTOF10: 0

## 2022-06-17 ASSESSMENT — LIFESTYLE VARIABLES: SMOKING_STATUS: 1

## 2022-06-17 ASSESSMENT — ENCOUNTER SYMPTOMS: SHORTNESS OF BREATH: 1

## 2022-06-17 NOTE — H&P
suspension Take 10 mLs by mouth daily Yes Ursula Galindo MD   metoprolol tartrate (LOPRESSOR) 25 MG tablet Take 1 tablet by mouth 2 times daily Yes DIVYA Alatorre   ondansetron (ZOFRAN) 4 MG tablet Take 1 tablet by mouth daily as needed for Nausea or Vomiting Yes Ursula Galindo MD   nitroGLYCERIN (NITROSTAT) 0.4 MG SL tablet Place 1 tablet under the tongue every 5 minutes as needed for Chest pain As directed Yes DIVYA Barrow - NP   ezetimibe (ZETIA) 10 MG tablet Take 10 mg by mouth daily Yes Historical Provider, MD   montelukast (SINGULAIR) 10 MG tablet Take 10 mg by mouth nightly Yes Historical Provider, MD   rosuvastatin (CRESTOR) 10 MG tablet Take 20 mg by mouth daily  Yes Historical Provider, MD   calcium citrate (CALCITRATE) 950 MG tablet Take 1 tablet by mouth daily Yes Historical Provider, MD   metaxalone (SKELAXIN) 800 MG tablet Take 800 mg by mouth Yes Historical Provider, MD   traMADol (ULTRAM) 50 MG tablet Take 50 mg by mouth. . Yes Historical Provider, MD   omeprazole (PRILOSEC) 20 MG delayed release capsule Take 20 mg by mouth daily Yes Historical Provider, MD   Multiple Minerals-Vitamins (CALCIUM & VIT D3 BONE HEALTH PO) Take by mouth Yes Historical Provider, MD   Cholecalciferol (VITAMIN D3) 2000 UNITS CAPS Take by mouth Yes Historical Provider, MD   vitamin B-12 (CYANOCOBALAMIN) 1000 MCG tablet Take 1,000 mcg by mouth daily Yes Historical Provider, MD   aspirin 81 MG tablet Take 81 mg by mouth 2 times daily Yes Historical Provider, MD   folic acid (FOLVITE) 1 MG tablet Take 1 mg by mouth daily Yes Historical Provider, MD   Fluticasone Furoate-Vilanterol (BREO ELLIPTA) 200-25 MCG/INH AEPB Inhale into the lungs Yes Historical Provider, MD   azelastine HCl 0.15 % SOLN by Nasal route Yes Historical Provider, MD   cilostazol (PLETAL) 100 MG tablet Take 100 mg by mouth 2 times daily Yes Historical Provider, MD   gabapentin (NEURONTIN) 100 MG capsule Take 1 capsule by mouth 2 times daily for 30 days. Radha Rendon MD        Review of Systems    Vitals:    06/16/22 0942   BP: 108/64   Pulse: 65   SpO2: 100%     BMI Readings from Last 1 Encounters:   06/16/22 23.05 kg/m²     . [id number][75      Physical Exam        This note was generated used a voice recognition software. Errors in voice recognition may have occurred. An electronic signature was used to authenticate this note.     --Elmer Eduardo MD

## 2022-06-17 NOTE — PROGRESS NOTES
To recovery room with CHING, Kaylin Herring and Endo nurse Byron Humphrey. Connected to monitors. Received on 6L N/C, oral air way in place.

## 2022-06-17 NOTE — H&P (VIEW-ONLY)
Pulmonary and Sleep Medicine    Cassy Melendrez (:  1945) is a 78 y.o. male,Established patient, here for evaluation of the following chief complaint(s):  Follow-up (Pt came in today for a follow up.)      Referring physician:  No referring provider defined for this encounter. ASSESSMENT/PLAN:  1. Squamous cell carcinoma of left lung ([address]  -     Case Request  2. Dyspnea on effort  3. Hoarseness  4. Cough        Recurrent atelectasis of the left lower lobe. The patient showed initial response to chemotherapy and Keytruda. There was significant decrease in the size of the left hilar mass. However the patient developed some interstitial findings and upper lobes with consolidation in the right upper lobe. That improved on subsequent imaging but did not completely resolve. Question if the changes are related to [address]. The left lower lobe atelectasis is concerning however for possible recurrent endobronchial tumor. We will proceed with bronchoscopy to evaluate the airway. We will obtain transbronchial biopsies. Discussed bronchoscopy with the patient. He is in agreement. Mariposa Salcido [facility], [facility], [facility]    Return in about 2 weeks (around 2022). SUBJECTIVE/OBJECTIVE:  The patient is here for follow-up after his a CT of the chest.  He underwent a CT for follow-up yesterday. Continues to be hoarse. Denies any hemoptysis. CT of the chest shows recurrent left lower lobe superior segment atelectasis. He continues to have bilateral new interstitial findings. Prior to Visit Medications    Medication Sig Taking?  Authorizing Provider   melatonin 3 MG TABS tablet Take 3 mg by mouth nightly as needed Yes Historical Provider, MD   diphenhydrAMINE (BENADRYL) 25 MG tablet Take 1 hour prior to CT scans Yes Diana Webb MD   predniSONE (DELTASONE) 50 MG tablet Take 1 tablet 24 hrs, 12 hrs and 1 hr prior to CT scans Yes Diana Webb MD   megestrol (MEGACE) 40 MG/ML suspension Take 10 mLs by mouth daily Yes Anoop Youssef MD   metoprolol tartrate (LOPRESSOR) 25 MG tablet Take 1 tablet by mouth 2 times daily Yes DIVYA Disla   ondansetron (ZOFRAN) 4 MG tablet Take 1 tablet by mouth daily as needed for Nausea or Vomiting Yes Anoop Youssef MD   nitroGLYCERIN (NITROSTAT) 0.4 MG SL tablet Place 1 tablet under the tongue every 5 minutes as needed for Chest pain As directed Yes DIVYA Moreno - NP   ezetimibe (ZETIA) 10 MG tablet Take 10 mg by mouth daily Yes Historical Provider, MD   montelukast (SINGULAIR) 10 MG tablet Take 10 mg by mouth nightly Yes Historical Provider, MD   rosuvastatin (CRESTOR) 10 MG tablet Take 20 mg by mouth daily  Yes Historical Provider, MD   calcium citrate (CALCITRATE) 950 MG tablet Take 1 tablet by mouth daily Yes Historical Provider, MD   metaxalone (SKELAXIN) 800 MG tablet Take 800 mg by mouth Yes Historical Provider, MD   traMADol (ULTRAM) 50 MG tablet Take 50 mg by mouth. . Yes Historical Provider, MD   omeprazole (PRILOSEC) 20 MG delayed release capsule Take 20 mg by mouth daily Yes Historical Provider, MD   Multiple Minerals-Vitamins (CALCIUM & VIT D3 BONE HEALTH PO) Take by mouth Yes Historical Provider, MD   Cholecalciferol (VITAMIN D3) 2000 UNITS CAPS Take by mouth Yes Historical Provider, MD   vitamin B-12 (CYANOCOBALAMIN) 1000 MCG tablet Take 1,000 mcg by mouth daily Yes Historical Provider, MD   aspirin 81 MG tablet Take 81 mg by mouth 2 times daily Yes Historical Provider, MD   folic acid (FOLVITE) 1 MG tablet Take 1 mg by mouth daily Yes Historical Provider, MD   Fluticasone Furoate-Vilanterol (BREO ELLIPTA) 200-25 MCG/INH AEPB Inhale into the lungs Yes Historical Provider, MD   azelastine HCl 0.15 % SOLN by Nasal route Yes Historical Provider, MD   cilostazol (PLETAL) 100 MG tablet Take 100 mg by mouth 2 times daily Yes Historical Provider, MD   gabapentin (NEURONTIN) 100 MG capsule Take 1 capsule by mouth 2 times daily for 30 days. Fauzia Brower MD        Review of Systems    Vitals:    06/16/22 0942   BP: 108/64   Pulse: 65   SpO2: 100%     BMI Readings from Last 1 Encounters:   06/16/22 23.05 kg/m²     . [id number]      Physical Exam        This note was generated used a voice recognition software. Errors in voice recognition may have occurred. An electronic signature was used to authenticate this note.     --Brenton Ramos MD

## 2022-06-17 NOTE — INTERVAL H&P NOTE
Update History & Physical    The patient's History and Physical of June 16, 2022 was reviewed with the patient and I examined the patient. There was no change. The surgical site was confirmed by the patient and me. Plan: The risks, benefits, expected outcome, and alternative to the recommended procedure have been discussed with the patient. Patient understands and wants to proceed with the procedure.      Electronically signed by Mariam Brannon MD on 6/17/2022 at 10:04 AM

## 2022-06-17 NOTE — PROCEDURES
After consent and under general anesthesia the patient underwent bronchoscopy through the endotracheal tube. Prior to endoscopy the vocal cords were explored using the laryngoscope. 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 The distal trachea was normal patricia was sharp and midline right and left bronchial trees were explored. There was no endobronchial disease on the right. On the left there was significant improvement in tumor in the left mainstem bronchus and lower lobe bronchus. There was residual disease that was biopsied. Specimen submitted for pathology. Alveolar lavage was done from the left lower lobe. Patient tolerated well. No immediate postoperative complication. Estimated blood loss: 3 cc. Complications: None. Specimen:  1. Left lower lobe bronchus endobronchial biopsy for pathology. 2.  Left lower lobe bronchoalveolar lavage for cytology. Recovery per anesthesia service.

## 2022-06-17 NOTE — ANESTHESIA POSTPROCEDURE EVALUATION
Department of Anesthesiology  Postprocedure Note    Patient: Akira Kimbrough  MRN: [id number]  YOB: 1945  Date of evaluation: 6/17/2022  Time:  10:36 AM     Procedure Summary     Date: 06/17/22 Room / Location: [address]    Anesthesia Start: 1000 Anesthesia Stop:     Procedure: BRONCHOSCOPY (Left ) Diagnosis:       Lung cancer ([address]      (Lung cancer ([facility] [C34.90])    Surgeons: Aislinn Medina MD Responsible Provider: DIVYA Mosqueda CRNA    Anesthesia Type: general, MAC ASA Status: 4          Anesthesia Type: No value filed. Adrienne Phase I: Adrienne Score: 6    Adrienne Phase II:      Last vitals: Reviewed and per EMR flowsheets.        Anesthesia Post Evaluation    Patient location during evaluation: PACU  Patient participation: complete - patient participated  Level of consciousness: sleepy but conscious  Pain score: 0  Airway patency: patent  Nausea & Vomiting: no nausea and no vomiting  Complications: no  Cardiovascular status: hemodynamically stable  Respiratory status: nasal cannula  Hydration status: euvolemic  Multimodal analgesia pain management approach

## 2022-06-17 NOTE — DISCHARGE INSTRUCTIONS
Before you come to the hospital        Arrival time: AS DIRECTED BY OFFICE     YOU MAY TAKE THE FOLLOWING MEDICATION(S) THE MORNING OF SURGERY WITH A SIP OF WATER: GABAPENTIN, ATIVAN, METOPROLOL, TRAMADOL           ALL OTHER HOME MEDICATION CHECK WITH YOUR PHYSICIAN (especially if you are taking diabetes medicines or blood thinners)    Do not take any Erectile Dysfunction medications (EX: CIALIS, VIAGRA) 24 hours prior to surgery      If you were given and instructed to use a germ- killing soap, use as directed the night before surgery and the morning of surgery before coming to the hospital.             Eating and drinking restrictions prior to scheduled arrival time    2 Hours before arrival time STOP   Drinking Clear liquids (water, apple juice-no pulp)     6 Hours before arrival time STOP   Milk or drinks that contain milk, full liquids    6 Hours before arrival time STOP   Light meals or foods, such as toast or cereal    8 Hours before arrival time STOP   Heavy foods, such as meat, fried foods, or fatty foods    (It is extremely important that you follow these guidelines to prevent delay or cancelation of your procedure)     Clear Liquids  Water and flavored water                                                                      Clear Fruit juices, such as cranberry juice and apple juice.  Black coffee (NO cream of any kind, including powdered).  Plain tea  Clear bouillon or broth.  Flavored gelatin.  Soda.  Gatorade or Powerade.  Full liquid examples  Juices that have pulp.  Frozen ice pops that contain fruit pieces.  Coffee with creamer  Milk.  Yogurt.              MANAGING PAIN AFTER SURGERY    We know you are probably wondering what your pain will be like after surgery.  Following surgery it is unrealistic to expect you will not have pain.   Pain is how our bodies let us know that something is wrong or cautions us to be careful.  That said, our goal is to make your pain tolerable.    Methods we may use  to treat your pain include (oral or IV medications, PCAs, epidurals, nerve blocks, etc.)   While some procedures require IV pain medications for a short time after surgery, transitioning to pain medications by mouth allows for better management of pain.   Your nurse will encourage you to take oral pain medications whenever possible.  IV medications work almost immediately, but only last a short while.  Taking medications by mouth allows for a more constant level of medication in your blood stream for a longer period of time.      Once your pain is out of control it is harder to get back under control.  It is important you are aware when your next dose of pain medication is due.  If you are admitted, your nurse may write the time of your next dose on the white board in your room to help you remember.      We are interested in your pain and encourage you to inform us about aggravating factors during your visit.   Many times a simple repositioning every few hours can make a big difference.    If your physician says it is okay, do not let your pain prevent you from getting out of bed. Be sure to call your nurse for assistance prior to getting up so you do not fall.      Before surgery, please decide your tolerable pain goal.  These faces help describe the pain ratings we use on a 0-10 scale.   Be prepared to tell us your goal and whether or not you take pain or anxiety medications at home.

## 2022-06-17 NOTE — ANESTHESIA PRE PROCEDURE
Department of Anesthesiology  Preprocedure Note       Name:  Abdelrahman Zaidi   Age:  78 y.o.  :  1945                                          MRN:  [id number]         Date:  2022      Surgeon: Yuliya Calderon): Brenton aRmos MD    Procedure: Procedure(s):  BRONCHOSCOPY    Medications prior to admission:   Prior to Admission medications    Medication Sig Start Date End Date Taking? Authorizing Provider   melatonin 3 MG TABS tablet Take 3 mg by mouth nightly as needed    Historical Provider, MD   diphenhydrAMINE (BENADRYL) 25 MG tablet Take 1 hour prior to CT scans 22   Steve Seen, MD   predniSONE (DELTASONE) 50 MG tablet Take 1 tablet 24 hrs, 12 hrs and 1 hr prior to CT scans 22   Steve Seen, MD   megestrol (MEGACE) 40 MG/ML suspension Take 10 mLs by mouth daily 22   Steve Seen, MD   metoprolol tartrate (LOPRESSOR) 25 MG tablet Take 1 tablet by mouth 2 times daily 3/29/22   DIVYA Santillan   gabapentin (NEURONTIN) 100 MG capsule Take 1 capsule by mouth 2 times daily for 30 days.  21  Fauzia Brower MD   ondansetron (ZOFRAN) 4 MG tablet Take 1 tablet by mouth daily as needed for Nausea or Vomiting 21   Steve Seen, MD   nitroGLYCERIN (NITROSTAT) 0.4 MG SL tablet Place 1 tablet under the tongue every 5 minutes as needed for Chest pain As directed 21   DIVYA Doty - NP   ezetimibe (ZETIA) 10 MG tablet Take 10 mg by mouth daily    Historical Provider, MD   montelukast (SINGULAIR) 10 MG tablet Take 10 mg by mouth nightly    Historical Provider, MD   rosuvastatin (CRESTOR) 10 MG tablet Take 20 mg by mouth daily     Historical Provider, MD   calcium citrate (CALCITRATE) 950 MG tablet Take 1 tablet by mouth daily  Patient not taking: Reported on 2022    Historical Provider, MD   metaxalone (SKELAXIN) 800 MG tablet Take 800 mg by mouth 17   Historical Provider, MD   traMADol (ULTRAM) 50 MG tablet Take 50 mg by mouth.. 1/25/17   Historical Provider, MD   omeprazole (PRILOSEC) 20 MG delayed release capsule Take 20 mg by mouth in the morning and at bedtime     Historical Provider, MD   Multiple Minerals-Vitamins (CALCIUM & VIT D3 BONE HEALTH PO) Take by mouth  Patient not taking: Reported on 6/17/2022    Historical Provider, MD   Cholecalciferol (VITAMIN D3) 2000 UNITS CAPS Take by mouth    Historical Provider, MD   vitamin B-12 (CYANOCOBALAMIN) 1000 MCG tablet Take 1,000 mcg by mouth daily    Historical Provider, MD   aspirin 81 MG tablet Take 81 mg by mouth 2 times daily    Historical Provider, MD   folic acid (FOLVITE) 1 MG tablet Take 1 mg by mouth daily    Historical Provider, MD   Fluticasone Furoate-Vilanterol (BREO ELLIPTA) 200-25 MCG/INH AEPB Inhale into the lungs    Historical Provider, MD   azelastine HCl 0.15 % SOLN by Nasal route    Historical Provider, MD   cilostazol (PLETAL) 100 MG tablet Take 100 mg by mouth 2 times daily    Historical Provider, MD       Current medications:    No current facility-administered medications for this visit. No current outpatient medications on file. Facility-Administered Medications Ordered in Other Visits   Medication Dose Route Frequency Provider Last Rate Last Admin    lactated ringers infusion   IntraVENous Continuous Bharath Tyler  mL/hr at 06/17/22 0926 New Bag at 06/17/22 0926       Allergies: Allergies   Allergen Reactions    Zithromax [Azithromycin]      abd pain.      Penicillins Rash     And pain      Shellfish-Derived Products Rash       Problem List:    Patient Active Problem List   Diagnosis Code    CAD (coronary artery disease) I25.10    Mixed hyperlipidemia E78.2    PVD (peripheral vascular disease) ([facility] [address] I73.9    Tobacco abuse Z72.0    Cigarette nicotine dependence without complication I32.886    Essential hypertension I10    Dyspnea on effort R06.00    H/O heart artery stent Z95.5    Abdominal aortic aneurysm (AAA) ([facility] [address] I71.4    Cough R05.9    Lung mass R91.8    Thyroid nodule E04.1    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    Hoarseness R49.0    Lung cancer (HCC) C34.90       Past Medical History:        Diagnosis Date    CAD (coronary artery disease)     RUFINO to LCX     Hyperlipidemia     Hypertension     Lung cancer ([facility] [address]     Nicotine dependence, cigarettes, w unsp disorders 2017    Prostate cancer ([facility] [address]     PVD (peripheral vascular disease) ([facility]     Tobacco abuse        Past Surgical History:        Procedure Laterality Date    BRONCHOSCOPY Left 2021    BRONCHOSCOPY ENDOBRONCHIAL ULTRASOUND performed by Alcira Tyler MD at [facility]   [address]  2013    EF 65%, patent stent to LCX, no new occlusive disease    CATARACT REMOVAL WITH IMPLANT Bilateral     CORONARY ANGIOPLASTY  2013    RUFINO to LCX    HERNIA REPAIR      HX VASCULAR STENT      LUNG BIOPSY  2021    PROSTATE SURGERY      SHOULDER SURGERY Left        Social History:    Social History     Tobacco Use    Smoking status: Former Smoker     Packs/day: 0.25     Years: 60.00     Pack years: 15.00     Types: Cigarettes     Quit date: 2022     Years since quittin.2    Smokeless tobacco: Never Used   Substance Use Topics    Alcohol use: No                                Counseling given: Not Answered      Vital Signs (Current): There were no vitals filed for this visit.                                            BP Readings from Last 3 Encounters:   22 120/80   22 108/64   22 111/68       NPO Status:                                                                                 BMI:   Wt Readings from Last 3 Encounters:   22 147 lb (66.7 kg)   22 147 lb 3.2 oz (66.8 kg)   22 146 lb 3.2 oz (66.3 kg)     There is no height or weight on file to calculate BMI.    CBC:   Lab Results   Component Value Date    WBC 8.36 2022    RBC 3.28 06/06/2022    HGB 10.7 06/06/2022    HCT 33.1 06/06/2022    .9 06/06/2022    RDW 14.7 06/06/2022     06/06/2022       CMP:   Lab Results   Component Value Date     06/06/2022    K 3.8 06/06/2022     06/06/2022    CO2 26 06/06/2022    BUN 12 06/06/2022    CREATININE 0.8 06/06/2022    GFRAA >60 11/24/2021    LABGLOM >60 06/06/2022    GLUCOSE 112 06/06/2022    PROT 7.2 06/06/2022    CALCIUM 9.2 06/06/2022    BILITOT 0.4 06/06/2022    ALKPHOS 65 06/06/2022    AST 39 06/06/2022    ALT 30 06/06/2022       POC Tests: No results for input(s): POCGLU, POCNA, POCK, POCCL, POCBUN, POCHEMO, POCHCT in the last 72 hours. Coags: No results found for: PROTIME, INR, APTT    HCG (If Applicable): No results found for: PREGTESTUR, PREGSERUM, HCG, HCGQUANT     ABGs: No results found for: PHART, PO2ART, KTE1IVL, URJ0BFD, BEART, X2WAJRSU     Type & Screen (If Applicable):  No results found for: LABABO, LABRH    Drug/Infectious Status (If Applicable):  No results found for: HIV, HEPCAB    COVID-19 Screening (If Applicable):   Lab Results   Component Value Date    COVID19 Not Detected 11/04/2021           Anesthesia Evaluation    Airway: Mallampati: II  TM distance: >3 FB   Neck ROM: full  Mouth opening: > = 3 FB   Dental:    (+) edentulous      Pulmonary:normal exam    (+) COPD:  shortness of breath:  current smoker          Patient did not smoke on day of surgery. Cardiovascular:  Exercise tolerance: poor (<4 METS),   (+) hypertension:, CAD:, LOVIGN:, hyperlipidemia        Rhythm: regular  Rate: normal           Beta Blocker:  Not on Beta Blocker         Neuro/Psych:   (+) psychiatric history:            GI/Hepatic/Renal:   (+) GERD: well controlled,           Endo/Other:    (+) malignancy/cancer. Abdominal:             Vascular: negative vascular ROS. Other Findings:             Anesthesia Plan      general and MAC     ASA 4       Induction: intravenous.     MIPS: Prophylactic antiemetics administered. Anesthetic plan and risks discussed with patient. Use of blood products discussed with patient whom.                      DIVYA Plunkett - CHING   6/17/2022

## 2022-06-20 NOTE — ANESTHESIA PROCEDURE NOTES
Airway  Urgency: elective    Date/Time: 6/20/2022 10:29 AM  Airway not difficult    General Information and Staff    Patient location during procedure: OR  CRNA/CAA: KEYA Moses CRNA    Indications and Patient Condition  Indications for airway management: airway protection    Preoxygenated: yes  MILS maintained throughout  Mask difficulty assessment: 1 - vent by mask    Final Airway Details  Final airway type: endotracheal airway      Successful airway: ETT  Cuffed: yes   Successful intubation technique: direct laryngoscopy  Facilitating devices/methods: intubating stylet  Endotracheal tube insertion site: oral  Blade: Russell  Blade size: 3  ETT size (mm): 6.0  Cormack-Lehane Classification: grade I - full view of glottis  Placement verified by: chest auscultation and capnometry   Cuff volume (mL): 6  Measured from: gums  Number of attempts at approach: 1  Assessment: lips, teeth, and gum same as pre-op and atraumatic intubation

## 2022-06-20 NOTE — ANESTHESIA PREPROCEDURE EVALUATION
Anesthesia Evaluation     Patient summary reviewed   no history of anesthetic complications:  NPO Solid Status: > 8 hours             Airway   Mallampati: II  Dental    (+) upper dentures and lower dentures    Pulmonary    (+) a smoker Former, lung cancer, COPD,   (-) asthma, sleep apnea  Cardiovascular   Exercise tolerance: poor (<4 METS)    ECG reviewed    (+) hypertension, CAD, cardiac stents (2013) dysrhythmias Bradycardia, hyperlipidemia,   (-) pacemaker, past MI, angina      Neuro/Psych  (-) seizures, TIA, CVA  GI/Hepatic/Renal/Endo    (+)  GERD,    (-) liver disease, no renal disease, diabetes    Musculoskeletal     Abdominal    Substance History      OB/GYN          Other   arthritis,    history of cancer                      Anesthesia Plan    ASA 3     general     intravenous induction     Anesthetic plan, risks, benefits, and alternatives have been provided, discussed and informed consent has been obtained with: patient.

## 2022-06-20 NOTE — OP NOTE
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     Direct laryngoscopy was performed and no masses, lesions, ulcerations, or other abnormalities were noted throughout the upper aerodigestive tract examination.  The larynx was suspended from Knutson stand and microscopic visualization undertaken with the Leica operating microscope.    Subsequently approximately 0.8 cc of Prolaryn was injected just lateral to the right thyroid arytenoid muscle primarily in the anterior and middle one thirds of the true vocal fold.  The procedure was subsequently terminated.     The patient was transported upon extubation to the postanesthesia care unit in stable condition.    SPECIMENS:  None    COMPLICATIONS: NONE    ESTIMATED BLOOD LOSS:  None    Blair Walsh MD  6/20/2022

## 2022-06-20 NOTE — ANESTHESIA POSTPROCEDURE EVALUATION
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        Post Anesthesia Care and Evaluation    Patient location during evaluation: PACU  Patient participation: complete - patient participated  Level of consciousness: awake and alert  Pain management: adequate    Airway patency: patent  Anesthetic complications: No anesthetic complications    Cardiovascular status: acceptable  Respiratory status: acceptable  Hydration status: acceptable    Comments: Blood pressure 117/79, pulse 58, temperature 97.5 °F (36.4 °C), temperature source Temporal, resp. rate 24, SpO2 98 %.    Pt discharged from PACU based on carlee score >8

## 2022-06-23 NOTE — PROGRESS NOTES
MEDICAL ONCOLOGY PROGRESS NOTE    Pt Name: Ariana Alcazar  MRN: [id number]  YOB: 1945  Date of evaluation: 7/2/2022      HISTORY OF PRESENT ILLNESS:    Reason for MD visit-toxicity assessment/disease management  The patient is a pleasant 78years old male who presents today for consideration of maintenance pembrolizumab. Patient has been tolerating treatments complains of fatigue. Patient continues to complains of upper abdominal bandlike discomfort. He had repeat CT chest abdomen pelvis to assess response. Diagnosis  · Squamous cell carcinoma, Left lung, Nov 2021  · CK5N5P6, stage IV (left thyroid nodule, skin metastasis)  · 11/5/21-PD-L1 Expressed 35%    Treatment Summary  · 12/20/21- Initiated Carboplatin AUC 5 Paclitaxel 175mg/m2 and Keytruda    · 3/14/22 Initiated maintenance Pembrolizumab 200mg every 3 weeks    Cancer History  Colten Elizalde was first seen by me on 11/15/2021. He was referred by pulmonary at [address] [place] [address] for a diagnosis of lung cancer. He developed complaints of cough. Chest x-ray was performed and showed a lung mass. Patient is a currently smoker. He lost about 25 pounds. · 9/28/21 CXR [facility]): Questionable mass density seen only on the lateral view with patchy density in left upper lobe. Further evaluation with computed tomography is recommended. COPD. · 10/4/21 CT chest [facility]): Mass lesion left hilum extending caudally along the mediastinum and inseparable from the mediastinum in this region. This is highly suspicious for malignancy. Consolidation and/or neoplastic mass left upper lobe as described above Emphysema. New right thyroid nodule, probably solid. 2 chronic cysts within the liver. · 10/22/21- PET scan [facility], [facility], [place]): Essentially, mass in the right side of the neck of the neck base measuring 2.8 cm x 2.4 cm. SUV 18.9. Also small focus soft tissue of the back at the base of the neck on the right. Lesion measured 9 mm. SUV 10.5.  Large mass in the left hilum measures 5.1 x 4.2 cm (SUV 18.6). Extensive parenchymal abnormality with a high SUV uptake in the left upper lobe. Apical component with a small amount of cavitation measuring 2.9 x 2.6 cm with SUV 11.3.  · 11/5/21 Lung, left mainstem bronchus biopsy with touch imprint smears: Invasive keratinizing squamous cell carcinoma. Lung, fine-needle aspiration of left hilar mass, ThinPrep and cell block: Keratinizing squamous cell carcinoma. Lung, left mainstem bronchial washing, ThinPrep and cell block: Keratinizing squamous cell carcinoma. · 11/15/2021-he was first seen by me. Recommended biopsy of right neck mass and also subcutaneous nodule right upper back. Discussed with ENT, Dr. Kevon Wood. · 11/17/2021 Final Diagnosis: Excisional biopsy of a metastatic nodul in the thyroid gland as well as metastatic skin nodule. Right posterior lower neck\": Metastatic keratinizing squamous cell carcinoma involving fibroadipose tissue. 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 · 11/24/2021 MRI Brain W Wo Contrast No acute intracranial abnormality, mass or acute infarction. Chronic ischemic and atrophic changes. · 12/6/2021-essentially, stage IV disease. Squamous cell carcinoma PDL 35% metastatic left upper back skin nodule consistent with squamous cell carcinoma. Left thyroid mass consistent with metastatic, cell carcinoma. Recommended frontline chemotherapy with carboplatin AUC 5, paclitaxel 175 mg/m², Keytruda every 3 weeks x4 cycles to be followed by maintenance Keytruda if stable disease. · 12/9/2021 2D Echo Normal left ventricular size and systolic function EF 55 to 60%. · 12/20/2021-initiation of palliative chemotherapy with carboplatin/Taxol/Keytruda. · 2/18/22 CT CHEST W CONTRAST A mid to lower left hilar mass is smaller in size. Specifically,a central mass in the lower left hilum now measures 5 x 3.2 cm and previously measured 6 x 4.5 cm.  Somewhat curvilinear opacities in both upper lobes, worsening on the left and developing on the right when compared to the prior study. There is an increasing area of similar opacity in the left upper lobe located slightly more superiorly now measuring 2.2 x 1.4 cm and previously measuring about 1.2 x 1.5 cm. There is another new area that has developed more inferiorly in the left upper lobe measuring 1.6 x 1 cm. There are similar appearing new developing  smewhat linear opacities in the right upper lobeGiven the curvilinear appearance and fine calcifications, progressive massive fibrosis is the leading differential. Infectious etiology cannot be ruled out, including atypical infections, such as TB. Pulmonary consultation recommended for this finding. Neoplastic disease is felt to be less likely given the appearance and bilateral upper lobe involvement. There is a stable subpleural right upper lobe nodule measuring 4-5 mm. Severe centrilobular emphysema. Atheromatous disease of the thoracic aorta and coronary arteries. No mediastinal lymphadenopathy is seen. Small densities in the trachea on the right and posteriorly are likely retained secretions. Interim right thyroidectomy. · 2/21/2022-essentially, interval response to treatment. Continue carboplatin/Taxol and Keytruda  · 3/14/22 Initiated maintenance Pembrolizumab 200mg every 3 weeks  · 3/14/2022 FL Esophagram Complete ([facility])  No esophageal mass or stricture. Prominent persistent contraction of the cricopharyngeus muscle with indentation of the posterior wall of the upper esophagus. Esophageal dysmotility with delayed clearance. · 4/13/2022 CT Chest W Contrast New ill-defined 1.6 cm spiculated pleural-based opacity in the left lower lobe medially image 94 series 4. This could be neoplastic. Infection and inflammation are also considered. Central lower left hilar area of mass effect is smaller on the current study. Stable small right upper lobe pulmonary nodule. Worsening release capsule Take 20 mg by mouth in the morning and at bedtime       Multiple Minerals-Vitamins (CALCIUM & VIT D3 BONE HEALTH PO) Take by mouth (Patient not taking: Reported on 6/17/2022)      Cholecalciferol (VITAMIN D3) 2000 UNITS CAPS Take by mouth      vitamin B-12 (CYANOCOBALAMIN) 1000 MCG tablet Take 1,000 mcg by mouth daily      aspirin 81 MG tablet Take 81 mg by mouth 2 times daily      folic acid (FOLVITE) 1 MG tablet Take 1 mg by mouth daily      Fluticasone Furoate-Vilanterol (BREO ELLIPTA) 200-25 MCG/INH AEPB Inhale into the lungs      azelastine HCl 0.15 % SOLN by Nasal route      cilostazol (PLETAL) 100 MG tablet Take 100 mg by mouth 2 times daily       No current facility-administered medications for this visit. Facility-Administered Medications Ordered in Other Visits   Medication Dose Route Frequency Provider Last Rate Last Admin    fludeoxyglucose F 18 injection 10 millicurie  10 millicurie IntraVENous ONCE PRN Sarai Presley MD           Allergies: Allergies   Allergen Reactions    Zithromax [Azithromycin]      abd pain.      Penicillins Rash     And pain      Shellfish-Derived Products Rash     Subjective     REVIEW OF SYSTEMS:   CONSTITUTIONAL: no fever, no night sweats,  fatigue;  HEENT: no blurring of vision, no double vision, no hearing difficulty, no tinnitus, no ulceration, no dysplasia, no epistaxis;   LUNGS: no cough, no hemoptysis, no wheeze,  no shortness of breath;  CARDIOVASCULAR: no palpitation, no chest pain, no shortness of breath;  GI: no abdominal pain, no nausea, no vomiting, no diarrhea, no constipation;  LLUVIA: no dysuria, no hematuria, no frequency or urgency, no nephrolithiasis;  MUSCULOSKELETAL: no joint pain, no swelling, no stiffness;  ENDOCRINE: no polyuria, no polydipsia, no cold or heat intolerance;  HEMATOLOGY: no easy bruising or bleeding, no history of clotting disorder;  DERMATOLOGY: no skin rash, no eczema, no pruritus;  PSYCHIATRY: no depression, no anxiety, no panic attacks, no suicidal ideation, no homicidal ideation;  NEUROLOGY: no syncope, no seizures, no numbness or tingling of hands, no numbness or tingling of feet, no paresis;     Objective   /71   Pulse 83   Temp 98.2 °F (36.8 °C) (Oral)   Ht 5' 7\" (1.702 m)   Wt 144 lb (65.3 kg)   SpO2 96%   BMI 22.55 kg/m²      PHYSICAL EXAM:  CONSTITUTIONAL: Alert, appropriate, no acute distress  EYES: Non icteric, EOM intact, pupils equal round   ENT: Mucus membranes moist, no oral pharyngeal lesions, external inspection of ears and nose are normal.  NECK: Supple, no masses. No palpable thyroid mass  CHEST/LUNGS: CTA bilaterally, normal respiratory effort   CARDIOVASCULAR: RRR, no murmurs. No lower extremity edema  ABDOMEN: soft non-tender, active bowel sounds, no HSM. No palpable masses  EXTREMITIES: warm, full ROM in all 4 extremities, no focal weakness. SKIN: warm, dry with no rashes or lesions  LYMPH: No cervical, clavicular, axillary, or inguinal lymphadenopathy  NEUROLOGIC: follows commands, non focal   PSYCH: mood and affect appropriate. Alert and oriented to time, place, person    LABORATORY RESULTS REVIEWED/ANALYZED BY ME:  6/17/2022 Lesion, left lower lobe bronchus, biopsies: No malignancy identified. Benign bronchial mucosa. 6/17/2022 Lung, left lower lobe bronchoalveolar lavage: Blood admixed with very rare degenerated benign appearing epithelial cells.    Lab Results   Component Value Date    WBC 8.71 06/27/2022    HGB 12.7 (L) 06/27/2022    HCT 40.6 06/27/2022    .5 (H) 06/27/2022     (L) 06/27/2022     Lab Results   Component Value Date    NEUTROABS 6.35 (H) 06/27/2022     Lab Results   Component Value Date     06/27/2022    K 3.8 06/27/2022     06/27/2022    CO2 24 06/27/2022    BUN 13 06/27/2022    CREATININE 0.7 06/27/2022    GLUCOSE 82 06/27/2022    CALCIUM 8.5 06/27/2022    PROT 6.7 06/27/2022    LABALBU 3.5 06/27/2022    BILITOT 0.6 06/27/2022    ALKPHOS 60 06/27/2022    AST 27 06/27/2022    ALT 26 06/27/2022    LABGLOM >60 06/27/2022    GFRAA >60 11/24/2021    GLOB 3.2 06/27/2022       RADIOLOGY STUDIES REVIEWED BY ME:  6/15/2022 CT Chest W Contrast  COPD. Central lobular emphysema with multiple bullae formation at the apices. Multiple ill-defined Spiculated masses, the largest of which measures 7.0 x 3.3 cm in the right super hilar region, indeterminate in nature. Differential consideration would include multifocal neoplasm versus interstitial scarring/fibrosis. Please correlate clinically. Bronchiectatic changes in the lower lobes bilaterally with suspected consolidation of the left lower lobe. Please correlate clinically 1.0 cm calcified right subcarinal node. Findings consistent with prior granulomatous disease. ASCVD of the aorta. Recommendation:Follow up as clinically indicated. 6/15/2022 CT Abd/Pelvis W IV Contrast (Oral) 3.3 x 1.7 cm fat containing left inguinal hernia. 3.7 x 3.3 cm mild aneurysmal dilatation of the distal abdominal aorta with circumferential mural thrombus. The appendix is not identified. Multiple simple hepatic cysts. Bilateral simple renal cysts. Recommendation: Follow up as clinically indicated. ASSESSMENT:    No orders of the defined types were placed in this encounter. Lynda Brown was seen today for cancer.     Diagnoses and all orders for this visit:    Squamous cell carcinoma of left lung ([facility] [address]    Encounter for antineoplastic immunotherapy    Fatigue due to treatment    Care plan discussed with patient    Stage IV squamous cell carcinoma of left lung (HCC)    Normocytic anemia      Non-small cell lung cancer, SCC [id number] stage IV (left thyroid, skin subcutaneous nodule) PDL 1 35%  Essentially, findings of squamous cell non-small cell lung cancer left lung with skin metastasis/left thyroid metastasis  Reviewed pathology skin nodule/left thyroid mass which were both consistent with metastatic squamous cell carcinoma. Recommended:  · Carboplatin AUC 5  · Paclitaxel 175 mg/m²  · Keytruda 200mg IV   X4 cycles  every 3 weeks  To be followed by maintenance Keytruda 200 mg IV every 3 weeks  Discussed at length about logistics/side effects of treatment. -Repeat CT soft tissue neck, chest, abdomen pelvis on 4/13/2022 showed findings of mixed response in the chest.    Continue maintenance pembrolizumab only. -Repeat CT chest, abdomen pelvis-it seems to me that he has overall stable disease in the right upper lobe and left lower lobe. Overall interpretation is very difficult due to underlying structural lung disease. However, it seems to me that he has mostly stable disease. He has significant postobstructive change/atelectasis that have improved on the most recent CT chest.  -Bone scan showed no obvious evidence of progression. Plan:  -Continue Keytruda, palliative setting. Given the fact that immunotherapy is the most important drug I do not feel that he has any significant progression or regression. Therefore, we will continue immunotherapy at this time and repeat CT scans in 2 months, around 8/15/2022. Chemotherapy related adverse events-fatigue, myalgia, arthralgia, nausea, insomnia, joint pain. Headaches- resolved. MRI brain was unremarkable.     Cancer related anxiety-Recommend Ativan 0.5mg as needed    Fatigue-  Lab Results   Component Value Date    TSH 4.530 (H) 06/27/2022     Insomnia-try melatonin 5 mg and Benadryl 25 mg at bedtime    PLAN:  · RTC with MD 6 weeks in treatment room  · Continue maintenance with pembrolizumab every 3 weeks  · CBC/CMP/TSH level today   · Continue Megace suspension 400mg daily  · CT chest, abdomen, pelvis 2 months, Aug 2022  · CT scan pre-meds given  · [facility] with [facility] speech [facility]   · Continue follow-up with Dr. Nadya Wick/[facility]NT  · Continue Zofran 4mg for nausea  · Continue  OTC Benadryl and/or Melatonin for sleep  · Continue Ativan 0.5mg every 8 hrs as needed       I, Justin Acosta, am pre charting  as Medical Assistant for Radha Meredith MD. Electronically signed by Justin Acosta MA on 7/2/2022 at 8:54 AM CDT. Gokul Sneed am scribing for Radha Meredith MD. Electronically signed by Sumeet Velazco RN on 7/2/2022 at 11:06 AM CDT. I, Dr Alexandra Dowell, personally performed the services described in this documentation as scribed by Sumeet Velazco RN in my presence and is both accurate and complete. I have seen, examined and reviewed this patient medication list, appropriate labs and imaging studies. I reviewed relevant medical records and others physicians notes. I discussed the plans of care with the patient. I answered all the questions to the patients satisfaction. I have also reviewed the chief complaint (CC) and part of the history (History of Present Illness (HPI), Past Family Social History [facility]), or Review of Systems (ROS) and made changes when appropriated.        (Please note that portions of this note were completed with a voice recognition program. Efforts were made to edit the dictations but occasionally words are mis-transcribed.)    Electronically signed by Radha Meredith MD on 7/2/2022 at 10:32 AM

## 2022-06-27 ENCOUNTER — OFFICE VISIT (OUTPATIENT)
Dept: HEMATOLOGY | Age: 77
End: 2022-06-27
Payer: OTHER GOVERNMENT

## 2022-06-27 ENCOUNTER — HOSPITAL ENCOUNTER (OUTPATIENT)
Dept: INFUSION THERAPY | Age: 77
Discharge: HOME | End: 2022-06-27
Payer: OTHER GOVERNMENT

## 2022-06-27 VITALS
WEIGHT: 144 LBS | TEMPERATURE: 98.2 F | OXYGEN SATURATION: 96 % | BODY MASS INDEX: 22.6 KG/M2 | DIASTOLIC BLOOD PRESSURE: 71 MMHG | HEIGHT: 67 IN | HEART RATE: 83 BPM | SYSTOLIC BLOOD PRESSURE: 107 MMHG

## 2022-06-27 DIAGNOSIS — R53.83 FATIGUE DUE TO TREATMENT: ICD-10-CM

## 2022-06-27 DIAGNOSIS — R91.8 LUNG MASS: Primary | ICD-10-CM

## 2022-06-27 DIAGNOSIS — C34.92 SQUAMOUS CELL CARCINOMA OF LEFT LUNG (HCC): ICD-10-CM

## 2022-06-27 DIAGNOSIS — C34.92 SQUAMOUS CELL CARCINOMA OF LEFT LUNG (HCC): Primary | ICD-10-CM

## 2022-06-27 DIAGNOSIS — C34.92 STAGE IV SQUAMOUS CELL CARCINOMA OF LEFT LUNG (HCC): ICD-10-CM

## 2022-06-27 DIAGNOSIS — Z51.12 ENCOUNTER FOR ANTINEOPLASTIC IMMUNOTHERAPY: ICD-10-CM

## 2022-06-27 DIAGNOSIS — Z71.89 CARE PLAN DISCUSSED WITH PATIENT: ICD-10-CM

## 2022-06-27 DIAGNOSIS — D64.9 NORMOCYTIC ANEMIA: ICD-10-CM

## 2022-06-27 DIAGNOSIS — E03.9 ACQUIRED HYPOTHYROIDISM: Primary | ICD-10-CM

## 2022-06-27 DIAGNOSIS — E03.9 ACQUIRED HYPOTHYROIDISM: ICD-10-CM

## 2022-06-27 LAB
ALBUMIN SERPL-MCNC: 3.5 G/DL (ref 3.5–5.2)
ALP BLD-CCNC: 60 U/L (ref 40–130)
ALT SERPL-CCNC: 26 U/L (ref 21–72)
ANION GAP SERPL CALCULATED.3IONS-SCNC: 8 MMOL/L (ref 7–19)
AST SERPL-CCNC: 27 U/L (ref 17–59)
BILIRUB SERPL-MCNC: 0.6 MG/DL (ref 0.2–1.3)
BUN BLDV-MCNC: 13 MG/DL (ref 9–20)
CALCIUM SERPL-MCNC: 8.5 MG/DL (ref 8.4–10.2)
CHLORIDE BLD-SCNC: 106 MMOL/L (ref 98–111)
CO2: 24 MMOL/L (ref 22–29)
CREAT SERPL-MCNC: 0.7 MG/DL (ref 0.6–1.2)
GFR NON-AFRICAN AMERICAN: >60
GLOBULIN: 3.2 G/DL
GLUCOSE BLD-MCNC: 82 MG/DL (ref 74–106)
HCT VFR BLD CALC: 40.6 % (ref 40.1–51)
HEMOGLOBIN: 12.7 G/DL (ref 13.7–17.5)
LYMPHOCYTES ABSOLUTE: 1.33 K/UL (ref 1.18–3.74)
LYMPHOCYTES RELATIVE PERCENT: 15.3 % (ref 19.3–53.1)
MCH RBC QN AUTO: 31.4 PG (ref 25.7–32.2)
MCHC RBC AUTO-ENTMCNC: 31.3 G/DL (ref 32.3–36.5)
MCV RBC AUTO: 100.5 FL (ref 79–92.2)
MONOCYTES ABSOLUTE: 0.78 K/UL (ref 0.24–0.82)
MONOCYTES RELATIVE PERCENT: 9 % (ref 4.7–12.5)
NEUTROPHILS ABSOLUTE: 6.35 K/UL (ref 1.56–6.13)
NEUTROPHILS RELATIVE PERCENT: 72.8 % (ref 34–71.1)
PDW BLD-RTO: 14.9 % (ref 11.6–14.4)
PLATELET # BLD: 143 K/UL (ref 163–337)
PMV BLD AUTO: 9.1 FL (ref 7.4–10.4)
POTASSIUM SERPL-SCNC: 3.8 MMOL/L (ref 3.5–5.1)
RBC # BLD: 4.04 M/UL (ref 4.63–6.08)
SODIUM BLD-SCNC: 138 MMOL/L (ref 137–145)
TOTAL PROTEIN: 6.7 G/DL (ref 6.3–8.2)
TSH SERPL DL<=0.05 MIU/L-ACNC: 4.53 UIU/ML (ref 0.27–4.2)
WBC # BLD: 8.71 K/UL (ref 4.23–9.07)

## 2022-06-27 PROCEDURE — 85025 COMPLETE CBC W/AUTO DIFF WBC: CPT

## 2022-06-27 PROCEDURE — 6360000002 HC RX W HCPCS: Performed by: INTERNAL MEDICINE

## 2022-06-27 PROCEDURE — 80053 COMPREHEN METABOLIC PANEL: CPT

## 2022-06-27 PROCEDURE — 96413 CHEMO IV INFUSION 1 HR: CPT

## 2022-06-27 PROCEDURE — 99214 OFFICE O/P EST MOD 30 MIN: CPT | Performed by: INTERNAL MEDICINE

## 2022-06-27 PROCEDURE — 2580000003 HC RX 258: Performed by: INTERNAL MEDICINE

## 2022-06-27 PROCEDURE — 1123F ACP DISCUSS/DSCN MKR DOCD: CPT | Performed by: INTERNAL MEDICINE

## 2022-06-27 RX ORDER — SODIUM CHLORIDE 0.9 % (FLUSH) 0.9 %
5-40 SYRINGE (ML) INJECTION PRN
Status: DISCONTINUED | OUTPATIENT
Start: 2022-06-27 | End: 2022-06-28 | Stop reason: HOSPADM

## 2022-06-27 RX ORDER — HEPARIN SODIUM (PORCINE) LOCK FLUSH IV SOLN 100 UNIT/ML 100 UNIT/ML
500 SOLUTION INTRAVENOUS PRN
Status: CANCELLED | OUTPATIENT
Start: 2022-06-27

## 2022-06-27 RX ORDER — ACETAMINOPHEN 325 MG/1
650 TABLET ORAL
Status: CANCELLED | OUTPATIENT
Start: 2022-06-27

## 2022-06-27 RX ORDER — DIPHENHYDRAMINE HYDROCHLORIDE 50 MG/ML
50 INJECTION INTRAMUSCULAR; INTRAVENOUS
Status: CANCELLED | OUTPATIENT
Start: 2022-06-27

## 2022-06-27 RX ORDER — SODIUM CHLORIDE 9 MG/ML
25 INJECTION, SOLUTION INTRAVENOUS PRN
Status: CANCELLED | OUTPATIENT
Start: 2022-06-27

## 2022-06-27 RX ORDER — SODIUM CHLORIDE 9 MG/ML
20 INJECTION, SOLUTION INTRAVENOUS ONCE
Status: CANCELLED | OUTPATIENT
Start: 2022-06-27 | End: 2022-06-27

## 2022-06-27 RX ORDER — EPINEPHRINE 1 MG/ML
0.3 INJECTION, SOLUTION, CONCENTRATE INTRAVENOUS PRN
Status: CANCELLED | OUTPATIENT
Start: 2022-06-27

## 2022-06-27 RX ORDER — ONDANSETRON 2 MG/ML
8 INJECTION INTRAMUSCULAR; INTRAVENOUS
Status: CANCELLED | OUTPATIENT
Start: 2022-06-27

## 2022-06-27 RX ORDER — MEPERIDINE HYDROCHLORIDE 50 MG/ML
12.5 INJECTION INTRAMUSCULAR; INTRAVENOUS; SUBCUTANEOUS PRN
Status: CANCELLED | OUTPATIENT
Start: 2022-06-27

## 2022-06-27 RX ORDER — SODIUM CHLORIDE 9 MG/ML
20 INJECTION, SOLUTION INTRAVENOUS ONCE
Status: DISCONTINUED | OUTPATIENT
Start: 2022-06-27 | End: 2022-06-29 | Stop reason: HOSPADM

## 2022-06-27 RX ORDER — SODIUM CHLORIDE 9 MG/ML
5-40 INJECTION INTRAVENOUS PRN
Status: CANCELLED | OUTPATIENT
Start: 2022-06-27

## 2022-06-27 RX ORDER — FAMOTIDINE 10 MG/ML
20 INJECTION, SOLUTION INTRAVENOUS
Status: CANCELLED | OUTPATIENT
Start: 2022-06-27

## 2022-06-27 RX ORDER — ALBUTEROL SULFATE 90 UG/1
4 AEROSOL, METERED RESPIRATORY (INHALATION) PRN
Status: CANCELLED | OUTPATIENT
Start: 2022-06-27

## 2022-06-27 RX ORDER — SODIUM CHLORIDE 9 MG/ML
INJECTION, SOLUTION INTRAVENOUS CONTINUOUS
Status: CANCELLED | OUTPATIENT
Start: 2022-06-27

## 2022-06-27 RX ORDER — SODIUM CHLORIDE 0.9 % (FLUSH) 0.9 %
5-40 SYRINGE (ML) INJECTION PRN
Status: CANCELLED | OUTPATIENT
Start: 2022-06-27

## 2022-06-27 RX ADMIN — SODIUM CHLORIDE 200 MG: 9 INJECTION, SOLUTION INTRAVENOUS at 10:28

## 2022-06-29 ENCOUNTER — HOSPITAL ENCOUNTER (OUTPATIENT)
Dept: NUCLEAR MEDICINE | Age: 77
Discharge: HOME | End: 2022-07-01
Payer: OTHER GOVERNMENT

## 2022-06-29 DIAGNOSIS — C34.92 SQUAMOUS CELL CARCINOMA OF LEFT LUNG (HCC): ICD-10-CM

## 2022-06-29 PROCEDURE — 78306 BONE IMAGING WHOLE BODY: CPT

## 2022-06-29 PROCEDURE — 3430000000 HC RX DIAGNOSTIC RADIOPHARMACEUTICAL: Performed by: INTERNAL MEDICINE

## 2022-06-29 PROCEDURE — 78306 BONE IMAGING WHOLE BODY: CPT | Performed by: RADIOLOGY

## 2022-06-29 PROCEDURE — A9561 TC99M OXIDRONATE: HCPCS | Performed by: INTERNAL MEDICINE

## 2022-06-29 RX ORDER — FLUDEOXYGLUCOSE F 18 200 MCI/ML
10 INJECTION, SOLUTION INTRAVENOUS
Status: DISCONTINUED | OUTPATIENT
Start: 2022-06-29 | End: 2022-07-03 | Stop reason: HOSPADM

## 2022-06-29 RX ADMIN — TECHNETIUM TC 99M OXIDRONATE 19.84 MILLICURIE: 3.15 INJECTION, POWDER, LYOPHILIZED, FOR SOLUTION INTRAVENOUS at 13:42

## 2022-07-06 ENCOUNTER — OFFICE VISIT (OUTPATIENT)
Dept: PULMONOLOGY | Age: 77
End: 2022-07-06
Payer: OTHER GOVERNMENT

## 2022-07-06 VITALS
SYSTOLIC BLOOD PRESSURE: 108 MMHG | BODY MASS INDEX: 22.13 KG/M2 | OXYGEN SATURATION: 96 % | HEART RATE: 78 BPM | WEIGHT: 141 LBS | HEIGHT: 67 IN | DIASTOLIC BLOOD PRESSURE: 60 MMHG

## 2022-07-06 DIAGNOSIS — R05.9 COUGH: ICD-10-CM

## 2022-07-06 DIAGNOSIS — C34.92 SQUAMOUS CELL CARCINOMA OF LEFT LUNG (HCC): Primary | ICD-10-CM

## 2022-07-06 DIAGNOSIS — R06.09 DYSPNEA ON EFFORT: ICD-10-CM

## 2022-07-06 DIAGNOSIS — R49.0 HOARSENESS: ICD-10-CM

## 2022-07-06 PROCEDURE — 99214 OFFICE O/P EST MOD 30 MIN: CPT | Performed by: INTERNAL MEDICINE

## 2022-07-06 PROCEDURE — 1123F ACP DISCUSS/DSCN MKR DOCD: CPT | Performed by: INTERNAL MEDICINE

## 2022-07-06 ASSESSMENT — ENCOUNTER SYMPTOMS
COUGH: 0
APNEA: 0
RHINORRHEA: 0
ABDOMINAL DISTENTION: 0
BACK PAIN: 0
ANAL BLEEDING: 0
ABDOMINAL PAIN: 0
WHEEZING: 0
SHORTNESS OF BREATH: 1
CHEST TIGHTNESS: 0

## 2022-07-06 NOTE — PROGRESS NOTES
Pulmonary and Sleep Medicine    Seema Alonzo (:  1945) is a 78 y.o. male,Established patient, here for evaluation of the following chief complaint(s):  Follow-up (Pt came in today for a bronch follow up.)      Referring physician:  No referring provider defined for this encounter. ASSESSMENT/PLAN:  1. Squamous cell carcinoma of left lung ([address]  2. Dyspnea on effort  3. Hoarseness  4. Cough        Continue current management for treatment of squamous cell carcinoma. [facility]ath Tyler [facility]    No follow-ups on file. SUBJECTIVE/OBJECTIVE:  The patient is here for follow-up after bronchoscopy. Bronchoscopy was performed. Please refer to the bronchoscopy note. There was no evidence of significant endobronchial disease and there was significant improvements compared to prior bronchoscopy. Biopsies done of the abnormal areas seen on bronchoscopy were benign. Prior to Visit Medications    Medication Sig Taking?  Authorizing Provider   melatonin 3 MG TABS tablet Take 3 mg by mouth nightly as needed Yes Historical Provider, MD   diphenhydrAMINE (BENADRYL) 25 MG tablet Take 1 hour prior to CT scans Yes Shazia Tang MD   predniSONE (DELTASONE) 50 MG tablet Take 1 tablet 24 hrs, 12 hrs and 1 hr prior to CT scans Yes Shazia Tang MD   megestrol (MEGACE) 40 MG/ML suspension Take 10 mLs by mouth daily Yes Shazia Tang MD   metoprolol tartrate (LOPRESSOR) 25 MG tablet Take 1 tablet by mouth 2 times daily Yes DIVYA Eastman   ondansetron (ZOFRAN) 4 MG tablet Take 1 tablet by mouth daily as needed for Nausea or Vomiting Yes Shazia Tang MD   nitroGLYCERIN (NITROSTAT) 0.4 MG SL tablet Place 1 tablet under the tongue every 5 minutes as needed for Chest pain As directed Yes DIVYA Sanabria - NP   ezetimibe (ZETIA) 10 MG tablet Take 10 mg by mouth daily Yes Historical Provider, MD   montelukast (SINGULAIR) 10 MG tablet Take 10 mg by mouth nightly Yes Historical Provider, MD   rosuvastatin (CRESTOR) 10 MG tablet Take 20 mg by mouth daily  Yes Historical Provider, MD   metaxalone (SKELAXIN) 800 MG tablet Take 800 mg by mouth Yes Historical Provider, MD   traMADol (ULTRAM) 50 MG tablet Take 50 mg by mouth. . Yes Historical Provider, MD   omeprazole (PRILOSEC) 20 MG delayed release capsule Take 20 mg by mouth in the morning and at bedtime  Yes Historical Provider, MD   Cholecalciferol (VITAMIN D3) 2000 UNITS CAPS Take by mouth Yes Historical Provider, MD   vitamin B-12 (CYANOCOBALAMIN) 1000 MCG tablet Take 1,000 mcg by mouth daily Yes Historical Provider, MD   aspirin 81 MG tablet Take 81 mg by mouth 2 times daily Yes Historical Provider, MD   folic acid (FOLVITE) 1 MG tablet Take 1 mg by mouth daily Yes Historical Provider, MD   Fluticasone Furoate-Vilanterol (BREO ELLIPTA) 200-25 MCG/INH AEPB Inhale into the lungs Yes Historical Provider, MD   azelastine HCl 0.15 % SOLN by Nasal route Yes Historical Provider, MD   cilostazol (PLETAL) 100 MG tablet Take 100 mg by mouth 2 times daily Yes Historical Provider, MD   gabapentin (NEURONTIN) 100 MG capsule Take 1 capsule by mouth 2 times daily for 30 days. Alli Vega MD   calcium citrate (CALCITRATE) 950 MG tablet Take 1 tablet by mouth daily  Patient not taking: Reported on 6/17/2022  Historical Provider, MD   Multiple Minerals-Vitamins (CALCIUM & VIT D3 BONE HEALTH PO) Take by mouth  Patient not taking: Reported on 6/17/2022  Historical Provider, MD        Review of Systems   Constitutional: Negative for activity change, appetite change, chills, diaphoresis and fatigue. HENT: Negative for congestion, dental problem, drooling, ear discharge, postnasal drip and rhinorrhea. Eyes: Negative for visual disturbance. Respiratory: Positive for shortness of breath. Negative for apnea, cough, chest tightness and wheezing. Gastrointestinal: Negative for abdominal distention, abdominal pain and anal bleeding. Endocrine: Negative for cold intolerance, heat intolerance and polydipsia. Genitourinary: Negative for difficulty urinating, dysuria, enuresis and flank pain. Musculoskeletal: Negative for arthralgias, back pain and gait problem. Allergic/Immunologic: Negative for environmental allergies. Neurological: Negative for dizziness, facial asymmetry, light-headedness and headaches. Vitals:    07/06/22 1042   BP: 108/60   Pulse: 78   SpO2: 96%     BMI Readings from Last 1 Encounters:   07/06/22 22.08 kg/m²     . [id number][71      Physical Exam  Vitals reviewed. Constitutional:       Appearance: Normal appearance. HENT:      Head: Normocephalic and atraumatic. Nose: Nose normal.   Eyes:      Extraocular Movements: Extraocular movements intact. Conjunctiva/sclera: Conjunctivae normal.   Cardiovascular:      Rate and Rhythm: Normal rate and regular rhythm. Heart sounds: No murmur heard. No friction rub. Pulmonary:      Effort: Pulmonary effort is normal. No respiratory distress. Breath sounds: Normal breath sounds. No stridor. No wheezing, rhonchi or rales. Abdominal:      General: There is no distension. Palpations: There is no mass. Tenderness: There is no abdominal tenderness. There is no guarding or rebound. Musculoskeletal:      Cervical back: Normal range of motion and neck supple. Neurological:      Mental Status: He is alert and oriented to person, place, and time. This note was generated used a voice recognition software. Errors in voice recognition may have occurred. An electronic signature was used to authenticate this note.     --Lali Reeder MD

## 2022-07-16 PROBLEM — R05.9 COUGH: Status: RESOLVED | Noted: 2021-11-04 | Resolved: 2022-07-16

## 2022-07-18 ENCOUNTER — HOSPITAL ENCOUNTER (OUTPATIENT)
Dept: INFUSION THERAPY | Age: 77
Discharge: HOME | End: 2022-07-18
Payer: OTHER GOVERNMENT

## 2022-07-18 VITALS
DIASTOLIC BLOOD PRESSURE: 71 MMHG | RESPIRATION RATE: 18 BRPM | HEART RATE: 86 BPM | BODY MASS INDEX: 22.73 KG/M2 | TEMPERATURE: 98.1 F | WEIGHT: 144.8 LBS | OXYGEN SATURATION: 95 % | HEIGHT: 67 IN | SYSTOLIC BLOOD PRESSURE: 110 MMHG

## 2022-07-18 DIAGNOSIS — C34.92 SQUAMOUS CELL CARCINOMA OF LEFT LUNG (HCC): ICD-10-CM

## 2022-07-18 DIAGNOSIS — C34.92 SQUAMOUS CELL CARCINOMA OF LEFT LUNG (HCC): Primary | ICD-10-CM

## 2022-07-18 DIAGNOSIS — E03.9 ACQUIRED HYPOTHYROIDISM: Primary | ICD-10-CM

## 2022-07-18 DIAGNOSIS — E03.9 ACQUIRED HYPOTHYROIDISM: ICD-10-CM

## 2022-07-18 LAB
HCT VFR BLD CALC: 37.1 % (ref 40.1–51)
HEMOGLOBIN: 11.7 G/DL (ref 13.7–17.5)
LYMPHOCYTES ABSOLUTE: 1.56 K/UL (ref 1.18–3.74)
LYMPHOCYTES RELATIVE PERCENT: 16.2 % (ref 19.3–53.1)
MCH RBC QN AUTO: 31.4 PG (ref 25.7–32.2)
MCHC RBC AUTO-ENTMCNC: 31.5 G/DL (ref 32.3–36.5)
MCV RBC AUTO: 99.5 FL (ref 79–92.2)
MONOCYTES ABSOLUTE: 0.99 K/UL (ref 0.24–0.82)
MONOCYTES RELATIVE PERCENT: 10.3 % (ref 4.7–12.5)
NEUTROPHILS ABSOLUTE: 6.84 K/UL (ref 1.56–6.13)
NEUTROPHILS RELATIVE PERCENT: 71.2 % (ref 34–71.1)
PDW BLD-RTO: 14.7 % (ref 11.6–14.4)
PLATELET # BLD: 238 K/UL (ref 163–337)
PMV BLD AUTO: 8.9 FL (ref 7.4–10.4)
RBC # BLD: 3.73 M/UL (ref 4.63–6.08)
WBC # BLD: 9.62 K/UL (ref 4.23–9.07)

## 2022-07-18 PROCEDURE — 85025 COMPLETE CBC W/AUTO DIFF WBC: CPT

## 2022-07-18 PROCEDURE — 96413 CHEMO IV INFUSION 1 HR: CPT

## 2022-07-18 PROCEDURE — 2580000003 HC RX 258: Performed by: NURSE PRACTITIONER

## 2022-07-18 PROCEDURE — 36415 COLL VENOUS BLD VENIPUNCTURE: CPT

## 2022-07-18 PROCEDURE — 6360000002 HC RX W HCPCS: Performed by: NURSE PRACTITIONER

## 2022-07-18 RX ORDER — ONDANSETRON 2 MG/ML
8 INJECTION INTRAMUSCULAR; INTRAVENOUS
Status: CANCELLED | OUTPATIENT
Start: 2022-07-18

## 2022-07-18 RX ORDER — HEPARIN SODIUM (PORCINE) LOCK FLUSH IV SOLN 100 UNIT/ML 100 UNIT/ML
500 SOLUTION INTRAVENOUS PRN
Status: CANCELLED | OUTPATIENT
Start: 2022-07-18

## 2022-07-18 RX ORDER — SODIUM CHLORIDE 9 MG/ML
5-40 INJECTION INTRAVENOUS PRN
Status: CANCELLED | OUTPATIENT
Start: 2022-07-18

## 2022-07-18 RX ORDER — SODIUM CHLORIDE 9 MG/ML
INJECTION, SOLUTION INTRAVENOUS CONTINUOUS
Status: CANCELLED | OUTPATIENT
Start: 2022-07-18

## 2022-07-18 RX ORDER — MEPERIDINE HYDROCHLORIDE 50 MG/ML
12.5 INJECTION INTRAMUSCULAR; INTRAVENOUS; SUBCUTANEOUS PRN
Status: CANCELLED | OUTPATIENT
Start: 2022-07-18

## 2022-07-18 RX ORDER — EPINEPHRINE 1 MG/ML
0.3 INJECTION, SOLUTION, CONCENTRATE INTRAVENOUS PRN
Status: CANCELLED | OUTPATIENT
Start: 2022-07-18

## 2022-07-18 RX ORDER — SODIUM CHLORIDE 0.9 % (FLUSH) 0.9 %
5-40 SYRINGE (ML) INJECTION PRN
Status: CANCELLED | OUTPATIENT
Start: 2022-07-18

## 2022-07-18 RX ORDER — FAMOTIDINE 10 MG/ML
20 INJECTION, SOLUTION INTRAVENOUS
Status: CANCELLED | OUTPATIENT
Start: 2022-07-18

## 2022-07-18 RX ORDER — DIPHENHYDRAMINE HYDROCHLORIDE 50 MG/ML
50 INJECTION INTRAMUSCULAR; INTRAVENOUS
Status: CANCELLED | OUTPATIENT
Start: 2022-07-18

## 2022-07-18 RX ORDER — ALBUTEROL SULFATE 90 UG/1
4 AEROSOL, METERED RESPIRATORY (INHALATION) PRN
Status: CANCELLED | OUTPATIENT
Start: 2022-07-18

## 2022-07-18 RX ORDER — ACETAMINOPHEN 325 MG/1
650 TABLET ORAL
Status: CANCELLED | OUTPATIENT
Start: 2022-07-18

## 2022-07-18 RX ORDER — SODIUM CHLORIDE 9 MG/ML
20 INJECTION, SOLUTION INTRAVENOUS ONCE
Status: CANCELLED | OUTPATIENT
Start: 2022-07-18 | End: 2022-07-18

## 2022-07-18 RX ORDER — SODIUM CHLORIDE 9 MG/ML
25 INJECTION, SOLUTION INTRAVENOUS PRN
Status: CANCELLED | OUTPATIENT
Start: 2022-07-18

## 2022-07-18 RX ORDER — SODIUM CHLORIDE 0.9 % (FLUSH) 0.9 %
5-40 SYRINGE (ML) INJECTION PRN
Status: DISCONTINUED | OUTPATIENT
Start: 2022-07-18 | End: 2022-07-19 | Stop reason: HOSPADM

## 2022-07-18 RX ADMIN — SODIUM CHLORIDE, PRESERVATIVE FREE 10 ML: 5 INJECTION INTRAVENOUS at 10:23

## 2022-07-18 RX ADMIN — SODIUM CHLORIDE 200 MG: 9 INJECTION, SOLUTION INTRAVENOUS at 09:54

## 2022-07-27 NOTE — PATIENT INSTRUCTIONS
CONTACT INFORMATION:  The main office phone number is [phone]. For emergencies after hours and on weekends, this number will convert over to our answering service and the on call provider will answer. Please try to keep non emergent phone calls/ questions to office hours 9am-5pm Monday through Friday.      [employer]  As an alternative, you can sign up and use the Epic MyChart system for more direct and quicker access for non emergent questions/ problems.  [employer] allows you to send messages to your doctor, view your test results, renew your prescriptions, schedule appointments, and more. To sign up, go to [employer] and click on the Sign Up Now link in the New User? box. Enter your [employer] Activation Code exactly as it appears below along with the last four digits of your Social Security Number and your Date of Birth () to complete the sign-up process. If you do not sign up before the expiration date, you must request a new code.     [employer] Activation Code: Activation code not generated  Current [employer] Status: Active     If you have questions, you can email [email or web address] or call [phone] to talk to our [employer] staff. Remember, [employer] is NOT to be used for urgent needs. For medical emergencies, dial 911.     IF YOU SMOKE OR USE TOBACCO PLEASE READ THE FOLLOWING:  Why is smoking bad for me?  Smoking increases the risk of heart disease, lung disease, vascular disease, stroke, and cancer. If you smoke, STOP!        IF YOU SMOKE OR USE TOBACCO PLEASE READ THE FOLLOWING:  Why is smoking bad for me?  Smoking increases the risk of heart disease, lung disease, vascular disease, stroke, and cancer. If you smoke, STOP!     For more information:  Quit Now [place]  -QUIT-NOW  [email or web address]

## 2022-07-27 NOTE — PROGRESS NOTES
YOB: 1945  Location: [facility]  Location Address: [address] [place] [address], [facility], [address] [place] [address]  Location Phone: [phone]    Chief Complaint   Patient presents with   • Post-op       History of Present Illness  Chucky Jamison is a 77 y.o. male.  Chucky Jamison is here for follow up of ENT complaints. The patient is s/p vocal cord injection on 2022. He reports that his voice seems to be slightly better, but does not feel that it has improved as much as he would like. His wife also reports that he continues to frequently pause while talking to catch his breath. Declines changes to dysphagia.    The patient is status post Excisional biopsy of right posterior inferior cervical neck mass and Right thyroidectomy and isthmusectomy with excision of right neck mass on 21. Patient continues to have hoarseness and dysphagia. He is also having fatigue and night sweats. He started The patient was started on Keytruda and carbotaxol per Dr. Trujillo.  He completed chemo 2022 and remains on immunotherapy     Non-small cell lung cancer, SCC [id number] (right thyroid, skin posterior cervical subcutaneous nodule)   SCCa - left lung with skin metastasis/right thyroid metastasis S/P right thyroidectomy 21  Pathology skin nodule/left thyroid mass consistent with metastatic SCCa.     Past Medical History:   Diagnosis Date   • Arthritis    • Cataract    • COPD (chronic obstructive pulmonary disease) (HCC)    • Coronary artery disease    • Disease of thyroid gland     LOWER LOBE REMOVED R/T CANCER   • Elevated cholesterol    • Emphysema lung (HCC)    • GERD (gastroesophageal reflux disease)    • Heart disease    • Hypercholesteremia    • Hypertension    • Lung cancer (HCC)    • Mass of right side of neck    • Mass of soft tissue of neck    • Prostate cancer (HCC)        Past Surgical History:   Procedure Laterality Date   • CARDIAC CATHETERIZATION     • CATARACT EXTRACTION WITH INTRAOCULAR LENS  IMPLANT Bilateral    • CORONARY STENT PLACEMENT      2013 X1   • EXCISION MASS HEAD/NECK Right 11/17/2021    Procedure: Excisional biopsy of right posterior neck mass and excisional biopsy of right anterior neck mass (related to the inferior right thyroid lobe);  Surgeon: Blair Walsh MD;  Location:  [facility];  Service: ENT;  Laterality: Right;   • HERNIA REPAIR     • LARYNGOSCOPY WITH LARYNGEAL INJECTION N/A 6/20/2022    Procedure: DIRECT LARYNGOSCOPY WITH VOCAL CORD INJECTION WITH CYMETRA OR PROLARYN;  Surgeon: Blair Walsh MD;  Location:  [facility];  Service: ENT;  Laterality: N/A;   • LUNG BIOPSY     • PROSTATE SURGERY         Outpatient Medications Marked as Taking for the 7/27/22 encounter (Office Visit) with Hossein Vanessa APRN   Medication Sig Dispense Refill   • Aspirin 81 MG capsule aspirin     • azelastine (ASTELIN) 0.1 % nasal spray 1 spray into each nostril.     • BREO ELLIPTA 200-25 MCG/INH aerosol powder  INHALE 1 PUFF DAILY  1   • Calcium Carb-Cholecalciferol (CALCIUM 500 + D PO) Take 1 tablet by mouth Daily. UNABLE TO SWALLOW RIGHT NOW     • Cholecalciferol (VITAMIN D3) 2000 UNITS capsule Take 2 tablets by mouth Daily.     • cilostazol (PLETAL) 100 MG tablet Take 100 mg by mouth 2 (Two) Times a Day. HOLD for DOS-11/17/21  1   • Cyanocobalamin (VITAMIN B12) 1000 MCG tablet controlled-release Take 1,000 mcg by mouth Daily.     • diphenhydrAMINE (BENADRYL) 25 mg capsule Take 25 mg by mouth At Night As Needed for Sleep.     • ezetimibe (ZETIA) 10 MG tablet Take 10 mg by mouth Daily.     • folic acid (FOLVITE) 1 MG tablet Take 1 mg by mouth Daily.     • gabapentin (NEURONTIN) 300 MG capsule Take 300 mg by mouth 2 (Two) Times a Day.     • HYDROcodone-acetaminophen (NORCO) 5-325 MG per tablet Take 1 tablet by mouth Every 4 (Four) Hours As Needed for Moderate Pain  or Severe Pain  (Pain) for up to 8 doses. 8 tablet 0   • ibuprofen (ADVIL,MOTRIN) 200 MG tablet Take 200 mg by mouth 5 (Five)  Times a Day. 2 at lunch 3 at bedtime     • LORazepam (ATIVAN) 0.5 MG tablet lorazepam 0.5 mg tablet   TAKE 1 TABLET BY MOUTH EVERY 8 HOURS AS NEEDED FOR ANXIETY FOR UP TO 30 DAYS.     • megestrol (MEGACE) 40 MG/ML suspension      • melatonin 5 MG tablet tablet Take 5 mg by mouth Every Night.     • metaxalone (SKELAXIN) 800 MG tablet Take 1 tablet by mouth 2 (Two) Times a Day As Needed for muscle spasms. (Patient taking differently: Take 800 mg by mouth 3 (Three) Times a Day As Needed for Muscle Spasms.) 60 tablet 0   • metoprolol tartrate (LOPRESSOR) 25 MG tablet TAKE 1 TABLET BY MOUTH TWICE A DAY  3   • montelukast (SINGULAIR) 10 MG tablet Take 10 mg by mouth Every Night.     • multivitamin with minerals tablet tablet Take 1 tablet by mouth Daily.     • nitroglycerin (NITROSTAT) 0.4 MG SL tablet PLACE 1 TAB UNDER TONGUE EVERY 5 MIN FOR UP TO 3 DOSES FOR CHEST PAIN **SEEK ER IF NO RELIEF**  5   • omeprazole (priLOSEC) 20 MG capsule Take 20 mg by mouth 2 (Two) Times a Day.     • ondansetron (ZOFRAN) 4 MG tablet Take 4 mg by mouth Daily With Lunch.     • Pembrolizumab (KEYTRUDA IV) Infuse 1 dose into a venous catheter. EVERY 21 DAYS FOR LUNG CANCER     • rosuvastatin (CRESTOR) 20 MG tablet Take 20 mg by mouth Daily.     • traMADol (ULTRAM) 50 MG tablet Take 1 tablet by mouth 3 (Three) Times a Day As Needed for moderate pain (4-6). 90 tablet 0       Azithromycin, Penicillins, and Shellfish-derived products    Family History   Problem Relation Age of Onset   • Heart disease Mother    • No Known Problems Father    • Cancer Brother    • Prostate cancer Brother        Social History     Socioeconomic History   • Marital status:    Tobacco Use   • Smoking status: Former Smoker     Packs/day: 0.50     Types: Cigarettes     Quit date: 2022     Years since quittin.4   • Smokeless tobacco: Never Used   • Tobacco comment: quit 2-3 months ago   Vaping Use   • Vaping Use: Never used   Substance and Sexual Activity    • Alcohol use: No   • Drug use: No   • Sexual activity: Defer       Review of Systems   Constitutional: Negative.    HENT: Positive for trouble swallowing and voice change. Negative for sore throat.    Eyes: Negative.    Respiratory: Negative.    Cardiovascular: Negative.    Gastrointestinal: Negative.    Genitourinary: Negative.    Musculoskeletal: Negative.    Skin: Negative.    Neurological: Negative.    Psychiatric/Behavioral: Negative.        Vitals:    07/27/22 0930   BP: 129/76   Pulse: 70   Resp: 16   Temp: 97.8 °F (36.6 °C)       Body mass index is 22.4 kg/m².    Objective     Physical Exam  Vitals reviewed.   Constitutional:       Appearance: Normal appearance. He is normal weight.   HENT:      Head: Normocephalic.      Right Ear: Hearing, tympanic membrane, ear canal and external ear normal.      Left Ear: Hearing, tympanic membrane, ear canal and external ear normal.      Nose: Nose normal.      Mouth/Throat:      Lips: Pink.      Mouth: Mucous membranes are moist.      Pharynx: Oropharynx is clear. Uvula midline.      Comments: Gravel consistency of voice  Neurological:      Mental Status: He is alert.         Assessment & Plan   Diagnoses and all orders for this visit:    1. Voice hoarseness (Primary)  -     Ambulatory Referral to Speech Therapy    2. Unilateral complete paralysis of vocal cord  -     Ambulatory Referral to Speech Therapy    3. Dysphonia  -     Ambulatory Referral to Speech Therapy    4. Pharyngoesophageal dysphagia    5. Thyroid carcinoma (HCC)    6. Malignant neoplasm of hilus of left lung (HCC)    7. Malignant neoplasm of upper lobe of left lung (HCC)      * Surgery not found *  Orders Placed This Encounter   Procedures   • Ambulatory Referral to Speech Therapy     Referral Priority:   Routine     Referral Type:   Speech Pathology     Referral Reason:   Specialty Services Required     Requested Specialty:   Speech Pathology     Number of Visits Requested:   1     Return in about 3  months (around 10/27/2022) for Recheck.      Call with any new or worsening problems or concerns  Speech therapy referral discussed with patient and mom    Patient Instructions   CONTACT INFORMATION:  The main office phone number is [phone]. For emergencies after hours and on weekends, this number will convert over to our answering service and the on call provider will answer. Please try to keep non emergent phone calls/ questions to office hours 9am-5pm Monday through Friday.      [employer]  As an alternative, you can sign up and use the Epic MyChart system for more direct and quicker access for non emergent questions/ problems.  [facility] [employer] allows you to send messages to your doctor, view your test results, renew your prescriptions, schedule appointments, and more. To sign up, go to [employer] and click on the Sign Up Now link in the New User? box. Enter your [employer] Activation Code exactly as it appears below along with the last four digits of your Social Security Number and your Date of Birth () to complete the sign-up process. If you do not sign up before the expiration date, you must request a new code.     [employer] Activation Code: Activation code not generated  Current [employer] Status: Active     If you have questions, you can email [email or web address] or call [phone] to talk to our [employer] staff. Remember, [employer] is NOT to be used for urgent needs. For medical emergencies, dial 911.     IF YOU SMOKE OR USE TOBACCO PLEASE READ THE FOLLOWING:  Why is smoking bad for me?  Smoking increases the risk of heart disease, lung disease, vascular disease, stroke, and cancer. If you smoke, STOP!        IF YOU SMOKE OR USE TOBACCO PLEASE READ THE FOLLOWING:  Why is smoking bad for me?  Smoking increases the risk of heart disease, lung disease, vascular disease, stroke, and cancer. If you smoke, STOP!     For more information:  Quit Now  [place]  1-800-QUIT-NOW  [email or web address]-[email or web address]

## 2022-08-04 NOTE — PROGRESS NOTES
MEDICAL ONCOLOGY PROGRESS NOTE    Pt Name: Lamont Lopez  MRN: [id number]  YOB: 1945  Date of evaluation: 8/8/2022      HISTORY OF PRESENT ILLNESS:    Reason for MD visit-toxicity assessment/disease management  The patient is a very pleasant 78years old male who has been diagnosed with stage IV squamous cell carcinoma. He had PD-L1 35%. He is status post induction chemoimmunotherapy. He is currently on maintenance with pembrolizumab. He has complaints of some fatigue. He has gained 9 pounds since last month. Denies any autoimmune side effects. Denies any skin rash, diarrhea or any new respiratory symptoms. Diagnosis  Squamous cell carcinoma, Left lung, Nov 2021  [id number], stage IV (left thyroid nodule, skin metastasis)  11/5/21-PD-L1 Expressed 35%    Treatment Summary  12/20/21- Initiated Carboplatin AUC 5 Paclitaxel 175mg/m2 and Keytruda    3/14/22 Initiated maintenance Pembrolizumab 200mg every 3 weeks    Cancer History  Leeanna Ley was first seen by me on 11/15/2021. He was referred by pulmonary at [facility] for a diagnosis of lung cancer. He developed complaints of cough. Chest x-ray was performed and showed a lung mass. Patient is a currently smoker. He lost about 25 pounds. 9/28/21 CXR [facility]): Questionable mass density seen only on the lateral view with patchy density in left upper lobe. Further evaluation with computed tomography is recommended. COPD.   10/4/21 CT chest [facility]): Mass lesion left hilum extending caudally along the mediastinum and inseparable from the mediastinum in this region. This is highly suspicious for malignancy. Consolidation and/or neoplastic mass left upper lobe as described above Emphysema. New right thyroid nodule, probably solid. 2 chronic cysts within the liver. 10/22/21- PET scan [facility]): Essentially, mass in the right side of the neck of the neck base measuring 2.8 cm x 2.4 cm. SUV 18.9.   Also small focus soft tissue of the back at the base of the neck on the right. Lesion measured 9 mm. SUV 10.5. Large mass in the left hilum measures 5.1 x 4.2 cm (SUV 18.6). Extensive parenchymal abnormality with a high SUV uptake in the left upper lobe. Apical component with a small amount of cavitation measuring 2.9 x 2.6 cm with SUV 11.3.  11/5/21 Lung, left mainstem bronchus biopsy with touch imprint smears: Invasive keratinizing squamous cell carcinoma. Lung, fine-needle aspiration of left hilar mass, ThinPrep and cell block: Keratinizing squamous cell carcinoma. Lung, left mainstem bronchial washing, ThinPrep and cell block: Keratinizing squamous cell carcinoma. 11/15/2021-he was first seen by me. Recommended biopsy of right neck mass and also subcutaneous nodule right upper back. Discussed with ENT, Dr. José Miguel Gao. 11/17/2021 Final Diagnosis: Excisional biopsy of a metastatic nodul in the thyroid gland as well as metastatic skin nodule. Right posterior lower neck\": Metastatic keratinizing squamous cell carcinoma involving fibroadipose tissue. 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 11/24/2021 MRI Brain W Wo Contrast No acute intracranial abnormality, mass or acute infarction. Chronic ischemic and atrophic changes. 12/6/2021-essentially, stage IV disease. Squamous cell carcinoma PDL 35% metastatic left upper back skin nodule consistent with squamous cell carcinoma. Left thyroid mass consistent with metastatic, cell carcinoma. Recommended frontline chemotherapy with carboplatin AUC 5, paclitaxel 175 mg/m², Keytruda every 3 weeks x4 cycles to be followed by maintenance Keytruda if stable disease. 12/9/2021 2D Echo Normal left ventricular size and systolic function EF 55 to 60%. 12/20/2021-initiation of palliative chemotherapy with carboplatin/Taxol/Keytruda. 2/18/22 CT CHEST W CONTRAST A mid to lower left hilar mass is smaller in size.  Specifically,a central mass in the lower left hilum now measures 5 x 3.2 cm and previously measured 6 x 4.5 cm. Somewhat curvilinear opacities in both upper lobes, worsening on the left and developing on the right when compared to the prior study. There is an increasing area of similar opacity in the left upper lobe located slightly more superiorly now measuring 2.2 x 1.4 cm and previously measuring about 1.2 x 1.5 cm. There is another new area that has developed more inferiorly in the left upper lobe measuring 1.6 x 1 cm. There are similar appearing new developing  smewhat linear opacities in the right upper lobeGiven the curvilinear appearance and fine calcifications, progressive massive fibrosis is the leading differential. Infectious etiology cannot be ruled out, including atypical infections, such as TB. Pulmonary consultation recommended for this finding. Neoplastic disease is felt to be less likely given the appearance and bilateral upper lobe involvement. There is a stable subpleural right upper lobe nodule measuring 4-5 mm. Severe centrilobular emphysema. Atheromatous disease of the thoracic aorta and coronary arteries. No mediastinal lymphadenopathy is seen. Small densities in the trachea on the right and posteriorly are likely retained secretions. Interim right thyroidectomy. 2/21/2022-essentially, interval response to treatment. Continue carboplatin/Taxol and Keytruda  3/14/22 Initiated maintenance Pembrolizumab 200mg every 3 weeks  3/14/2022 FL Esophagram Complete ([facility])  No esophageal mass or stricture. Prominent persistent contraction of the cricopharyngeus muscle with indentation of the posterior wall of the upper esophagus. Esophageal dysmotility with delayed clearance. 4/13/2022 CT Chest W Contrast New ill-defined 1.6 cm spiculated pleural-based opacity in the left lower lobe medially image 94 series 4. This could be neoplastic. Infection and inflammation are also considered. Central lower left hilar area of mass effect is smaller on the current study. Stable small right upper lobe pulmonary nodule. Worsening consolidation in the right upper lobe. This is likely infectious or inflammatory. Curvilinear opacities in the left upper lobe appear relatively stable and may also be infectious or inflammatory. Please see the neck, abdomen and pelvis CT report separately. 4/13/2022 CT Abd/Pelvis W IV Contrast (Oral) New ill-defined 1.6 cm spiculated pleural-based opacity in the left lower lobe medially image 94 series 4. This could be neoplastic. Infection and inflammation are also considered. Central lower left hilar area of mass effect is smaller on the current study. Stable small right upper lobe pulmonary nodule. Worsening consolidation in the right upper lobe. This is likely infectious or inflammatory. Curvilinear opacities in the left upper lobe appear relatively stable and may also be infectious or inflammatory. Please see the neck, abdomen and pelvis CT report separately. 4/13/2022 CT Soft Tissue Neck W Contrast No pathologically enlarged cervical chain or posterior triangle lymphadenopathy is present. There is asymmetry at the level of the right vocal fold with asymmetric soft tissue density and adduction of the right vocal fold. Correlation is recommended with the patient's history. Those portions of the brain which are visualized are normal in appearance. Please see previously dictated CT of the chest report for findings within both upper lobes. The right lobe of the thyroid gland is not visualized and I suspect is surgically absent. 5/25/2022-essentially, findings of mixed response in the chest.  Continue Keytruda. Repeat CT chest in 2 months. 6/15/2022 CT Chest W Contrast  COPD. Central lobular emphysema with multiple bullae formation at the apices. Multiple ill-defined Spiculated masses, the largest of which measures 7.0 x 3.3 cm in the right super hilar region, indeterminate in nature.  Differential consideration would include multifocal neoplasm versus interstitial scarring/fibrosis. Please correlate clinically. Bronchiectatic changes in the lower lobes bilaterally with suspected consolidation of the left lower lobe. Please correlate clinically 1.0 cm calcified right subcarinal node. 6/15/2022 CT Abd/Pelvis W IV Contrast (Oral) 3.3 x 1.7 cm fat containing left inguinal hernia. 3.7 x 3.3 cm mild aneurysmal dilatation of the distal abdominal aorta with circumferential mural thrombus. The appendix is not identified. Multiple simple hepatic cysts. Bilateral simple renal cysts. Recommendation: Follow up as clinically indicated. 6/17/2022-he was seen by pulmonary referred for new finding the left lower lobe. He underwent a bronchoscopy and biopsy Lesion, left lower lobe bronchus, biopsies: No malignancy identified. Benign bronchial mucosa. 6/17/2022 Lung, left lower lobe bronchoalveolar lavage: Blood admixed with very rare degenerated benign appearing epithelial cells. 6/27/2022- I reviewed results CT chest abdomen pelvis. The findings in the CT chest seems to be stable. Unfortunately, the radiologist did not specify his impression whether this is better worse or stable. I did my best to reach out to radiology on 6/27/2022 and I am waiting a callback. Continue Keytruda for now.  6/29/2022 NM Bone Scan A tiny focus of upatke is in the soft tissues right mid thigh region. Foci of increased tracer accumulation are in the left 3rd, 4th and 5thleft ribs. These areas correspond to sclerotic foci is seen in the ribcage on CT chest dated 6/15/2022. The pattern of uptake abd sclerosis suggest remote posttraumatic changes with metastatic disease being less likely. 8/8/2022-patient tolerating treatment with Derril Kraig well. Plans is to repeat CT scans before next visit.     Past Medical History:    Past Medical History:   Diagnosis Date    CAD (coronary artery disease)     RUFINO to LCX 1/13    Hyperlipidemia     Hypertension     Lung cancer ([facility] [address]     Nicotine dependence, cigarettes, w unsp disorders 12/1/2017    Prostate cancer ([facility] [address]     PVD (peripheral vascular disease) ([facility] [address]     Tobacco abuse        Past Surgical History:    Past Surgical History:   Procedure Laterality Date    BRONCHOSCOPY Left 11/5/2021    BRONCHOSCOPY ENDOBRONCHIAL ULTRASOUND performed by Donnie Cavazos MD at [facility]    BRONCHOSCOPY Left 6/17/2022    BRONCHOSCOPY performed by Donnie Cavazos MD at [facility], [place]  02/02/2013    EF 65%, patent stent to LCX, no new occlusive disease    CATARACT REMOVAL WITH IMPLANT Bilateral 2014    CORONARY ANGIOPLASTY  01/28/2013    RUFINO to Marcy [address]es [address] VASCULAR STENT  2013    LUNG BIOPSY  11/05/2021    PROSTATE SURGERY      SHOULDER SURGERY Left        Social History:    Marital status:   Smoking status:Yes; 62 years; 5-6 a day  ETOH status:No  Resides:[place], [place]    Family History:   Family History   Problem Relation Age of Onset    Hypertension Mother     Stroke Mother     Cancer Brother 61        Lung    Cancer Brother 79        Prostate       Current Hospital Medications:    Current Outpatient Medications   Medication Sig Dispense Refill    diphenhydrAMINE (BENADRYL) 25 MG tablet Take 1 hr before CT 1 tablet 0    predniSONE (DELTASONE) 50 MG tablet Take 1 tablet 24hrs before CTs, 1 tablet 12 hrs before CTs and 1 hr before CTs 3 tablet 0    melatonin 3 MG TABS tablet Take 3 mg by mouth nightly as needed      megestrol (MEGACE) 40 MG/ML suspension Take 10 mLs by mouth daily 480 mL 5    metoprolol tartrate (LOPRESSOR) 25 MG tablet Take 1 tablet by mouth 2 times daily 180 tablet 3    gabapentin (NEURONTIN) 100 MG capsule Take 1 capsule by mouth 2 times daily for 30 days.  60 capsule 0    ondansetron (ZOFRAN) 4 MG tablet Take 1 tablet by mouth daily as needed for Nausea or Vomiting 30 tablet 0    nitroGLYCERIN (NITROSTAT) 0.4 MG SL tablet Place 1 tablet under the tongue every 5 minutes as needed for Chest pain As directed 25 tablet 5    ezetimibe (ZETIA) 10 MG tablet Take 10 mg by mouth daily      montelukast (SINGULAIR) 10 MG tablet Take 10 mg by mouth nightly      rosuvastatin (CRESTOR) 10 MG tablet Take 20 mg by mouth daily       calcium citrate (CALCITRATE) 950 MG tablet Take 1 tablet by mouth daily (Patient not taking: Reported on 6/17/2022)      metaxalone (SKELAXIN) 800 MG tablet Take 800 mg by mouth      traMADol (ULTRAM) 50 MG tablet Take 50 mg by mouth. Jaquelin Bloodgood omeprazole (PRILOSEC) 20 MG delayed release capsule Take 20 mg by mouth in the morning and at bedtime       Multiple Minerals-Vitamins (CALCIUM & VIT D3 BONE HEALTH PO) Take by mouth (Patient not taking: Reported on 6/17/2022)      Cholecalciferol (VITAMIN D3) 2000 UNITS CAPS Take by mouth      vitamin B-12 (CYANOCOBALAMIN) 1000 MCG tablet Take 1,000 mcg by mouth daily      aspirin 81 MG tablet Take 81 mg by mouth 2 times daily      folic acid (FOLVITE) 1 MG tablet Take 1 mg by mouth daily      Fluticasone Furoate-Vilanterol (BREO ELLIPTA) 200-25 MCG/INH AEPB Inhale into the lungs      azelastine HCl 0.15 % SOLN by Nasal route      cilostazol (PLETAL) 100 MG tablet Take 100 mg by mouth 2 times daily       No current facility-administered medications for this visit. Allergies: Allergies   Allergen Reactions    Zithromax [Azithromycin]      abd pain.      Penicillins Rash     And pain      Shellfish-Derived Products Rash     Subjective     REVIEW OF SYSTEMS:   CONSTITUTIONAL: no fever, no night sweats, fatigue;  HEENT: no blurring of vision, no double vision, no hearing difficulty, no tinnitus, no ulceration, no dysplasia, no epistaxis;   LUNGS: no cough, no hemoptysis, no wheeze,  no shortness of breath;  CARDIOVASCULAR: no palpitation, no chest pain, no shortness of breath;  GI: no abdominal pain, no nausea, no vomiting, no diarrhea, no constipation;  LLUVIA: no dysuria, no hematuria, no frequency or urgency, no nephrolithiasis;  MUSCULOSKELETAL: no joint pain, Bilateral LE swelling, no stiffness;  ENDOCRINE: no polyuria, no polydipsia, no cold or heat intolerance;  HEMATOLOGY: no easy bruising or bleeding, no history of clotting disorder;  DERMATOLOGY: no skin rash, no eczema, no pruritus;  PSYCHIATRY: no depression, no anxiety, no panic attacks, no suicidal ideation, no homicidal ideation;  NEUROLOGY: no syncope, no seizures, no numbness or tingling of hands, no numbness or tingling of feet, no paresis;     Objective   /65   Pulse 90   Temp 98.4 °F (36.9 °C)   Resp 18   Ht 5' 7\" (1.702 m)   Wt 150 lb 12.8 oz (68.4 kg)   SpO2 96%   BMI 23.62 kg/m²      PHYSICAL EXAM:  CONSTITUTIONAL: Alert, appropriate, no acute distress  EYES: Non icteric, EOM intact, pupils equal round   ENT: Mucus membranes moist, no oral pharyngeal lesions, external inspection of ears and nose are normal.  NECK: Supple, no masses. No palpable thyroid mass  CHEST/LUNGS: CTA bilaterally, normal respiratory effort   CARDIOVASCULAR: RRR, no murmurs. No lower extremity edema  ABDOMEN: soft non-tender, active bowel sounds, no HSM. No palpable masses  EXTREMITIES: warm, full ROM in all 4 extremities, no focal weakness. SKIN: warm, dry with no rashes or lesions  LYMPH: No cervical, clavicular, axillary, or inguinal lymphadenopathy  NEUROLOGIC: follows commands, non focal   PSYCH: mood and affect appropriate.   Alert and oriented to time, place, person    LABORATORY RESULTS REVIEWED/ANALYZED BY ME:  Lab Results   Component Value Date    WBC 9.45 (H) 08/08/2022    HGB 8.4 (L) 08/08/2022    HCT 27.4 (L) 08/08/2022    .1 (H) 08/08/2022     08/08/2022     Lab Results   Component Value Date    NEUTROABS 7.11 (H) 08/08/2022     Lab Results   Component Value Date     08/08/2022    K 3.9 08/08/2022     08/08/2022    CO2 24 08/08/2022    BUN 13 08/08/2022    CREATININE 0.7 08/08/2022    GLUCOSE 113 (H) 08/08/2022    CALCIUM 9.3 08/08/2022    PROT 6.2 (L) 08/08/2022    LABALBU 3.3 (L) 08/08/2022    BILITOT 0.3 08/08/2022    ALKPHOS 73 08/08/2022    AST 28 08/08/2022    ALT 28 08/08/2022    LABGLOM >60 08/08/2022    GFRAA >60 11/24/2021    GLOB 2.8 08/08/2022           RADIOLOGY STUDIES REVIEWED BY ME:  6/29/2022 NM Bone Scan A tiny focus of upatke is in the soft tissues right mid thigh region. Foci of increased tracer accumulation are in the left 3rd, 4th and 5thleft ribs. These areas correspond to sclerotic foci is seen in the ribcage on CT chest dated 6/15/2022. The pattern of uptake abd sclerosis suggesrs remote posttraumatic changes with metastatic disease being less likely. ASSESSMENT:    Orders Placed This Encounter   Procedures    CT ABDOMEN PELVIS W IV CONTRAST Additional Contrast? Oral     Standing Status:   Future     Standing Expiration Date:   8/8/2023     Scheduling Instructions:      Sched 2 weeks     Order Specific Question:   Additional Contrast?     Answer:   Oral     Order Specific Question:   STAT Creatinine as needed:     Answer:   No     Order Specific Question:   Reason for exam:     Answer:   COMPARE TO PREVIOUS SCANS to assess response to treatment    CT CHEST W CONTRAST     Standing Status:   Future     Standing Expiration Date:   8/8/2023     Scheduling Instructions:      Sched 2 weeks     Order Specific Question:   STAT Creatinine as needed:     Answer:   No     Order Specific Question:   Reason for exam:     Answer:   COMPARE TO PREVIOUS SCANS to assess response to treatment    CBC with Auto Differential     Standing Status:   Future     Number of Occurrences:   1     Standing Expiration Date:   8/8/2023    Comprehensive Metabolic Panel     Standing Status:   Future     Number of Occurrences:   1     Standing Expiration Date:   8/8/2023    TSH     Standing Status:   Future     Number of Occurrences:   1     Standing Expiration Date:   8/8/2023        Farideh An was seen today for cancer.     Diagnoses and all orders improved on the most recent CT chest.  -Bone scan showed no obvious evidence of progression. Plan:  -Continue Keytruda, palliative setting. Given the fact that immunotherapy is the most important drug I do not feel that he has any significant progression or regression. Therefore, we will continue immunotherapy at this time and repeat CT scans in 2 months, around 8/15/2022.    -Repeat CT scans before next visit. Chemotherapy related adverse events-fatigue, myalgia, arthralgia, nausea, insomnia, joint pain. Headaches- resolved. MRI brain was unremarkable. Cancer related anxiety-Recommend Ativan 0.5mg as needed    Fatigue-  Lab Results   Component Value Date    TSH 4.110 08/08/2022     Insomnia-try melatonin 5 mg and Benadryl 25 mg at bedtime    PLAN:  RTC with MD 3 weeks in treatment room  Continue maintenance with pembrolizumab every 3 weeks  CBC/CMP/TSH level today   Continue Megace suspension 400mg daily  CT chest, abdomen, pelvis 2 weeks  CT scan pre-meds given  Continue follow-up with [facility] speech therapy   Continue follow-up with Dr. Gloria Wick/[facility] ENT  Continue Zofran 4mg for nausea  Continue  OTC Benadryl and/or Melatonin for sleep  Continue Ativan 0.5mg every 8 hrs as needed          ILiliana, rhonda pre charting  as Medical Assistant for Femi Gonzalez MD. Electronically signed by Liliana Maldonado MA on 8/8/2022 at 3:07 PM CDT. Joel Ramirez, rhonda scribing for Femi Gonzalez MD. Electronically signed by Patricia Martin RN on 8/8/2022 at 10:55 AM CDT. I, Dr Gautam Mayes, personally performed the services described in this documentation as scribed by Patricia Martin RN in my presence and is both accurate and complete. I have seen, examined and reviewed this patient medication list, appropriate labs and imaging studies. I reviewed relevant medical records and others physicians notes. I discussed the plans of care with the patient.  I answered all the questions to the patients satisfaction. I have also reviewed the chief complaint (CC) and part of the history (History of Present Illness (HPI), Past Family Social History [facility]), or Review of Systems (ROS) and made changes when appropriated.        (Please note that portions of this note were completed with a voice recognition program. Efforts were made to edit the dictations but occasionally words are mis-transcribed.)    Electronically signed by Chiquis Alatorre MD on 8/8/2022 at 4:20 PM

## 2022-08-08 ENCOUNTER — TELEPHONE (OUTPATIENT)
Dept: HEMATOLOGY | Age: 77
End: 2022-08-08

## 2022-08-08 ENCOUNTER — OFFICE VISIT (OUTPATIENT)
Dept: HEMATOLOGY | Age: 77
End: 2022-08-08
Payer: OTHER GOVERNMENT

## 2022-08-08 ENCOUNTER — HOSPITAL ENCOUNTER (OUTPATIENT)
Dept: INFUSION THERAPY | Age: 77
Discharge: HOME | End: 2022-08-08
Payer: OTHER GOVERNMENT

## 2022-08-08 VITALS
SYSTOLIC BLOOD PRESSURE: 109 MMHG | DIASTOLIC BLOOD PRESSURE: 65 MMHG | OXYGEN SATURATION: 96 % | HEART RATE: 90 BPM | TEMPERATURE: 98.4 F | HEIGHT: 67 IN | RESPIRATION RATE: 18 BRPM | BODY MASS INDEX: 23.67 KG/M2 | WEIGHT: 150.8 LBS

## 2022-08-08 DIAGNOSIS — R53.83 FATIGUE DUE TO TREATMENT: ICD-10-CM

## 2022-08-08 DIAGNOSIS — D64.81 ANTINEOPLASTIC CHEMOTHERAPY INDUCED ANEMIA: ICD-10-CM

## 2022-08-08 DIAGNOSIS — T45.1X5A ANTINEOPLASTIC CHEMOTHERAPY INDUCED ANEMIA: ICD-10-CM

## 2022-08-08 DIAGNOSIS — C34.92 SQUAMOUS CELL CARCINOMA OF LEFT LUNG (HCC): Primary | ICD-10-CM

## 2022-08-08 DIAGNOSIS — Z91.041 CONTRAST MEDIA ALLERGY: ICD-10-CM

## 2022-08-08 DIAGNOSIS — C34.92 STAGE IV SQUAMOUS CELL CARCINOMA OF LEFT LUNG (HCC): ICD-10-CM

## 2022-08-08 DIAGNOSIS — C34.92 SQUAMOUS CELL CARCINOMA OF LEFT LUNG (HCC): ICD-10-CM

## 2022-08-08 DIAGNOSIS — R53.83 FATIGUE DUE TO TREATMENT: Primary | ICD-10-CM

## 2022-08-08 LAB
ALBUMIN SERPL-MCNC: 3.3 G/DL (ref 3.5–5.2)
ALP BLD-CCNC: 73 U/L (ref 40–130)
ALT SERPL-CCNC: 28 U/L (ref 21–72)
ANION GAP SERPL CALCULATED.3IONS-SCNC: 8 MMOL/L (ref 7–19)
AST SERPL-CCNC: 28 U/L (ref 17–59)
BASOPHILS ABSOLUTE: 0.03 K/UL (ref 0.01–0.08)
BASOPHILS RELATIVE PERCENT: 0.3 % (ref 0.1–1.2)
BILIRUB SERPL-MCNC: 0.3 MG/DL (ref 0.2–1.3)
BUN BLDV-MCNC: 13 MG/DL (ref 9–20)
CALCIUM SERPL-MCNC: 9.3 MG/DL (ref 8.4–10.2)
CHLORIDE BLD-SCNC: 108 MMOL/L (ref 98–111)
CO2: 24 MMOL/L (ref 22–29)
CREAT SERPL-MCNC: 0.7 MG/DL (ref 0.6–1.2)
EOSINOPHILS ABSOLUTE: 0.06 K/UL (ref 0.04–0.54)
EOSINOPHILS RELATIVE PERCENT: 0.6 % (ref 0.7–7)
GFR NON-AFRICAN AMERICAN: >60
GLOBULIN: 2.8 G/DL
GLUCOSE BLD-MCNC: 113 MG/DL (ref 74–106)
HCT VFR BLD CALC: 27.4 % (ref 40.1–51)
HEMOGLOBIN: 8.4 G/DL (ref 13.7–17.5)
LYMPHOCYTES ABSOLUTE: 1.25 K/UL (ref 1.18–3.74)
LYMPHOCYTES RELATIVE PERCENT: 13.2 % (ref 19.3–53.1)
MCH RBC QN AUTO: 31 PG (ref 25.7–32.2)
MCHC RBC AUTO-ENTMCNC: 30.7 G/DL (ref 32.3–36.5)
MCV RBC AUTO: 101.1 FL (ref 79–92.2)
MONOCYTES ABSOLUTE: 0.88 K/UL (ref 0.24–0.82)
MONOCYTES RELATIVE PERCENT: 9.3 % (ref 4.7–12.5)
NEUTROPHILS ABSOLUTE: 7.11 K/UL (ref 1.56–6.13)
NEUTROPHILS RELATIVE PERCENT: 75.3 % (ref 34–71.1)
PDW BLD-RTO: 15.9 % (ref 11.6–14.4)
PLATELET # BLD: 273 K/UL (ref 163–337)
PMV BLD AUTO: 9.1 FL (ref 7.4–10.4)
POTASSIUM SERPL-SCNC: 3.9 MMOL/L (ref 3.5–5.1)
RBC # BLD: 2.71 M/UL (ref 4.63–6.08)
SODIUM BLD-SCNC: 140 MMOL/L (ref 137–145)
TOTAL PROTEIN: 6.2 G/DL (ref 6.3–8.2)
TSH SERPL DL<=0.05 MIU/L-ACNC: 4.11 UIU/ML (ref 0.27–4.2)
WBC # BLD: 9.45 K/UL (ref 4.23–9.07)

## 2022-08-08 PROCEDURE — 2580000003 HC RX 258: Performed by: INTERNAL MEDICINE

## 2022-08-08 PROCEDURE — 96413 CHEMO IV INFUSION 1 HR: CPT

## 2022-08-08 PROCEDURE — 1123F ACP DISCUSS/DSCN MKR DOCD: CPT | Performed by: INTERNAL MEDICINE

## 2022-08-08 PROCEDURE — 85025 COMPLETE CBC W/AUTO DIFF WBC: CPT

## 2022-08-08 PROCEDURE — 99214 OFFICE O/P EST MOD 30 MIN: CPT | Performed by: INTERNAL MEDICINE

## 2022-08-08 PROCEDURE — 6360000002 HC RX W HCPCS: Performed by: INTERNAL MEDICINE

## 2022-08-08 PROCEDURE — 80053 COMPREHEN METABOLIC PANEL: CPT

## 2022-08-08 RX ORDER — SODIUM CHLORIDE 9 MG/ML
25 INJECTION, SOLUTION INTRAVENOUS PRN
Status: CANCELLED | OUTPATIENT
Start: 2022-08-08

## 2022-08-08 RX ORDER — ACETAMINOPHEN 325 MG/1
650 TABLET ORAL
Status: CANCELLED | OUTPATIENT
Start: 2022-08-08

## 2022-08-08 RX ORDER — SODIUM CHLORIDE 9 MG/ML
INJECTION, SOLUTION INTRAVENOUS CONTINUOUS
Status: CANCELLED | OUTPATIENT
Start: 2022-08-08

## 2022-08-08 RX ORDER — HEPARIN SODIUM (PORCINE) LOCK FLUSH IV SOLN 100 UNIT/ML 100 UNIT/ML
500 SOLUTION INTRAVENOUS PRN
Status: CANCELLED | OUTPATIENT
Start: 2022-08-08

## 2022-08-08 RX ORDER — PREDNISONE 50 MG/1
TABLET ORAL
Qty: 5 TABLET | Refills: 0 | Status: SHIPPED | OUTPATIENT
Start: 2022-08-08 | End: 2022-08-08

## 2022-08-08 RX ORDER — ONDANSETRON 2 MG/ML
8 INJECTION INTRAMUSCULAR; INTRAVENOUS
Status: CANCELLED | OUTPATIENT
Start: 2022-08-08

## 2022-08-08 RX ORDER — SODIUM CHLORIDE 0.9 % (FLUSH) 0.9 %
5-40 SYRINGE (ML) INJECTION PRN
Status: CANCELLED | OUTPATIENT
Start: 2022-08-08

## 2022-08-08 RX ORDER — EPINEPHRINE 1 MG/ML
0.3 INJECTION, SOLUTION, CONCENTRATE INTRAVENOUS PRN
Status: CANCELLED | OUTPATIENT
Start: 2022-08-08

## 2022-08-08 RX ORDER — DIPHENHYDRAMINE HYDROCHLORIDE 50 MG/ML
50 INJECTION INTRAMUSCULAR; INTRAVENOUS
Status: CANCELLED | OUTPATIENT
Start: 2022-08-08

## 2022-08-08 RX ORDER — FAMOTIDINE 10 MG/ML
20 INJECTION, SOLUTION INTRAVENOUS
Status: CANCELLED | OUTPATIENT
Start: 2022-08-08

## 2022-08-08 RX ORDER — SODIUM CHLORIDE 9 MG/ML
5-40 INJECTION INTRAVENOUS PRN
Status: CANCELLED | OUTPATIENT
Start: 2022-08-08

## 2022-08-08 RX ORDER — MEPERIDINE HYDROCHLORIDE 50 MG/ML
12.5 INJECTION INTRAMUSCULAR; INTRAVENOUS; SUBCUTANEOUS PRN
Status: CANCELLED | OUTPATIENT
Start: 2022-08-08

## 2022-08-08 RX ORDER — SODIUM CHLORIDE 9 MG/ML
20 INJECTION, SOLUTION INTRAVENOUS ONCE
Status: CANCELLED | OUTPATIENT
Start: 2022-08-08 | End: 2022-08-08

## 2022-08-08 RX ORDER — DIPHENHYDRAMINE HCL 25 MG
TABLET ORAL
Qty: 1 TABLET | Refills: 0 | Status: SHIPPED | OUTPATIENT
Start: 2022-08-08

## 2022-08-08 RX ORDER — ALBUTEROL SULFATE 90 UG/1
4 AEROSOL, METERED RESPIRATORY (INHALATION) PRN
Status: CANCELLED | OUTPATIENT
Start: 2022-08-08

## 2022-08-08 RX ORDER — PREDNISONE 50 MG/1
TABLET ORAL
Qty: 3 TABLET | Refills: 0 | Status: SHIPPED | OUTPATIENT
Start: 2022-08-08 | End: 2022-08-29 | Stop reason: ALTCHOICE

## 2022-08-08 RX ADMIN — SODIUM CHLORIDE 200 MG: 9 INJECTION, SOLUTION INTRAVENOUS at 11:15

## 2022-08-23 ENCOUNTER — HOSPITAL ENCOUNTER (OUTPATIENT)
Dept: CT IMAGING | Age: 77
Discharge: HOME | End: 2022-08-23
Payer: OTHER GOVERNMENT

## 2022-08-23 DIAGNOSIS — C34.92 SQUAMOUS CELL CARCINOMA OF LEFT LUNG (HCC): ICD-10-CM

## 2022-08-23 DIAGNOSIS — C34.92 STAGE IV SQUAMOUS CELL CARCINOMA OF LEFT LUNG (HCC): ICD-10-CM

## 2022-08-23 PROCEDURE — 6360000004 HC RX CONTRAST MEDICATION: Performed by: INTERNAL MEDICINE

## 2022-08-23 PROCEDURE — 74177 CT ABD & PELVIS W/CONTRAST: CPT

## 2022-08-23 PROCEDURE — 74177 CT ABD & PELVIS W/CONTRAST: CPT | Performed by: RADIOLOGY

## 2022-08-23 PROCEDURE — 71260 CT THORAX DX C+: CPT

## 2022-08-23 PROCEDURE — 71260 CT THORAX DX C+: CPT | Performed by: RADIOLOGY

## 2022-08-23 RX ADMIN — IOPAMIDOL 75 ML: 755 INJECTION, SOLUTION INTRAVENOUS at 11:01

## 2022-08-26 DIAGNOSIS — C34.92 SQUAMOUS CELL CARCINOMA OF LEFT LUNG (HCC): ICD-10-CM

## 2022-08-26 DIAGNOSIS — R53.83 FATIGUE DUE TO TREATMENT: Primary | ICD-10-CM

## 2022-08-29 ENCOUNTER — OFFICE VISIT (OUTPATIENT)
Dept: HEMATOLOGY | Age: 77
End: 2022-08-29
Payer: OTHER GOVERNMENT

## 2022-08-29 ENCOUNTER — HOSPITAL ENCOUNTER (OUTPATIENT)
Dept: INFUSION THERAPY | Age: 77
Discharge: HOME | End: 2022-08-29
Payer: OTHER GOVERNMENT

## 2022-08-29 VITALS
HEART RATE: 80 BPM | HEIGHT: 67 IN | WEIGHT: 150 LBS | SYSTOLIC BLOOD PRESSURE: 99 MMHG | RESPIRATION RATE: 18 BRPM | OXYGEN SATURATION: 98 % | BODY MASS INDEX: 23.54 KG/M2 | TEMPERATURE: 98.1 F | DIASTOLIC BLOOD PRESSURE: 70 MMHG

## 2022-08-29 DIAGNOSIS — R53.83 FATIGUE DUE TO TREATMENT: Primary | ICD-10-CM

## 2022-08-29 DIAGNOSIS — C34.92 STAGE IV SQUAMOUS CELL CARCINOMA OF LEFT LUNG (HCC): Primary | ICD-10-CM

## 2022-08-29 DIAGNOSIS — Z51.12 ENCOUNTER FOR ANTINEOPLASTIC IMMUNOTHERAPY: ICD-10-CM

## 2022-08-29 DIAGNOSIS — D64.81 ANTINEOPLASTIC CHEMOTHERAPY INDUCED ANEMIA: ICD-10-CM

## 2022-08-29 DIAGNOSIS — T45.1X5A ANTINEOPLASTIC CHEMOTHERAPY INDUCED ANEMIA: ICD-10-CM

## 2022-08-29 DIAGNOSIS — E03.9 ACQUIRED HYPOTHYROIDISM: ICD-10-CM

## 2022-08-29 DIAGNOSIS — Z71.89 CARE PLAN DISCUSSED WITH PATIENT: ICD-10-CM

## 2022-08-29 DIAGNOSIS — C34.92 SQUAMOUS CELL CARCINOMA OF LEFT LUNG (HCC): Primary | ICD-10-CM

## 2022-08-29 DIAGNOSIS — C34.92 SQUAMOUS CELL CARCINOMA OF LEFT LUNG (HCC): ICD-10-CM

## 2022-08-29 DIAGNOSIS — R53.83 FATIGUE DUE TO TREATMENT: ICD-10-CM

## 2022-08-29 DIAGNOSIS — T45.1X5D ADVERSE EFFECT OF CHEMOTHERAPY, SUBSEQUENT ENCOUNTER: ICD-10-CM

## 2022-08-29 LAB
ALBUMIN SERPL-MCNC: 3.8 G/DL (ref 3.5–5.2)
ALP BLD-CCNC: 94 U/L (ref 40–130)
ALT SERPL-CCNC: 20 U/L (ref 21–72)
ANION GAP SERPL CALCULATED.3IONS-SCNC: 10 MMOL/L (ref 7–19)
AST SERPL-CCNC: 23 U/L (ref 17–59)
BASOPHILS ABSOLUTE: 0.03 K/UL (ref 0.01–0.08)
BASOPHILS RELATIVE PERCENT: 0.3 % (ref 0.1–1.2)
BILIRUB SERPL-MCNC: 0.4 MG/DL (ref 0.2–1.3)
BUN BLDV-MCNC: 14 MG/DL (ref 9–20)
CALCIUM SERPL-MCNC: 9 MG/DL (ref 8.4–10.2)
CHLORIDE BLD-SCNC: 107 MMOL/L (ref 98–111)
CO2: 24 MMOL/L (ref 22–29)
CREAT SERPL-MCNC: 0.9 MG/DL (ref 0.6–1.2)
EOSINOPHILS ABSOLUTE: 0.11 K/UL (ref 0.04–0.54)
EOSINOPHILS RELATIVE PERCENT: 1 % (ref 0.7–7)
GFR NON-AFRICAN AMERICAN: >60
GLOBULIN: 3 G/DL
GLUCOSE BLD-MCNC: 92 MG/DL (ref 74–106)
HCT VFR BLD CALC: 34.3 % (ref 40.1–51)
HEMOGLOBIN: 10.4 G/DL (ref 13.7–17.5)
LYMPHOCYTES ABSOLUTE: 1.55 K/UL (ref 1.18–3.74)
LYMPHOCYTES RELATIVE PERCENT: 13.5 % (ref 19.3–53.1)
MCH RBC QN AUTO: 29.8 PG (ref 25.7–32.2)
MCHC RBC AUTO-ENTMCNC: 30.3 G/DL (ref 32.3–36.5)
MCV RBC AUTO: 98.3 FL (ref 79–92.2)
MONOCYTES ABSOLUTE: 0.97 K/UL (ref 0.24–0.82)
MONOCYTES RELATIVE PERCENT: 8.4 % (ref 4.7–12.5)
NEUTROPHILS ABSOLUTE: 8.69 K/UL (ref 1.56–6.13)
NEUTROPHILS RELATIVE PERCENT: 75.4 % (ref 34–71.1)
PDW BLD-RTO: 15.7 % (ref 11.6–14.4)
PLATELET # BLD: 287 K/UL (ref 163–337)
PMV BLD AUTO: 9 FL (ref 7.4–10.4)
POTASSIUM SERPL-SCNC: 4.3 MMOL/L (ref 3.5–5.1)
RBC # BLD: 3.49 M/UL (ref 4.63–6.08)
SODIUM BLD-SCNC: 141 MMOL/L (ref 137–145)
TOTAL PROTEIN: 6.9 G/DL (ref 6.3–8.2)
TSH SERPL DL<=0.05 MIU/L-ACNC: 7.13 UIU/ML (ref 0.27–4.2)
WBC # BLD: 11.51 K/UL (ref 4.23–9.07)

## 2022-08-29 PROCEDURE — 2580000003 HC RX 258: Performed by: NURSE PRACTITIONER

## 2022-08-29 PROCEDURE — 6360000002 HC RX W HCPCS: Performed by: NURSE PRACTITIONER

## 2022-08-29 PROCEDURE — 1123F ACP DISCUSS/DSCN MKR DOCD: CPT | Performed by: NURSE PRACTITIONER

## 2022-08-29 PROCEDURE — 99214 OFFICE O/P EST MOD 30 MIN: CPT | Performed by: NURSE PRACTITIONER

## 2022-08-29 PROCEDURE — 85025 COMPLETE CBC W/AUTO DIFF WBC: CPT

## 2022-08-29 PROCEDURE — 80053 COMPREHEN METABOLIC PANEL: CPT

## 2022-08-29 PROCEDURE — 96413 CHEMO IV INFUSION 1 HR: CPT

## 2022-08-29 RX ORDER — HEPARIN SODIUM (PORCINE) LOCK FLUSH IV SOLN 100 UNIT/ML 100 UNIT/ML
500 SOLUTION INTRAVENOUS PRN
Status: CANCELLED | OUTPATIENT
Start: 2022-08-29

## 2022-08-29 RX ORDER — EPINEPHRINE 1 MG/ML
0.3 INJECTION, SOLUTION, CONCENTRATE INTRAVENOUS PRN
Status: CANCELLED | OUTPATIENT
Start: 2022-08-29

## 2022-08-29 RX ORDER — ACETAMINOPHEN 325 MG/1
650 TABLET ORAL
Status: CANCELLED | OUTPATIENT
Start: 2022-08-29

## 2022-08-29 RX ORDER — SODIUM CHLORIDE 9 MG/ML
5-40 INJECTION INTRAVENOUS PRN
Status: CANCELLED | OUTPATIENT
Start: 2022-08-29

## 2022-08-29 RX ORDER — DIPHENHYDRAMINE HYDROCHLORIDE 50 MG/ML
50 INJECTION INTRAMUSCULAR; INTRAVENOUS
Status: CANCELLED | OUTPATIENT
Start: 2022-08-29

## 2022-08-29 RX ORDER — SODIUM CHLORIDE 9 MG/ML
25 INJECTION, SOLUTION INTRAVENOUS PRN
Status: CANCELLED | OUTPATIENT
Start: 2022-08-29

## 2022-08-29 RX ORDER — ALBUTEROL SULFATE 90 UG/1
4 AEROSOL, METERED RESPIRATORY (INHALATION) PRN
Status: CANCELLED | OUTPATIENT
Start: 2022-08-29

## 2022-08-29 RX ORDER — FAMOTIDINE 10 MG/ML
20 INJECTION, SOLUTION INTRAVENOUS
Status: CANCELLED | OUTPATIENT
Start: 2022-08-29

## 2022-08-29 RX ORDER — MEPERIDINE HYDROCHLORIDE 50 MG/ML
12.5 INJECTION INTRAMUSCULAR; INTRAVENOUS; SUBCUTANEOUS PRN
Status: CANCELLED | OUTPATIENT
Start: 2022-08-29

## 2022-08-29 RX ORDER — SODIUM CHLORIDE 9 MG/ML
20 INJECTION, SOLUTION INTRAVENOUS ONCE
Status: CANCELLED | OUTPATIENT
Start: 2022-08-29 | End: 2022-08-29

## 2022-08-29 RX ORDER — SODIUM CHLORIDE 0.9 % (FLUSH) 0.9 %
5-40 SYRINGE (ML) INJECTION PRN
Status: CANCELLED | OUTPATIENT
Start: 2022-08-29

## 2022-08-29 RX ORDER — SODIUM CHLORIDE 9 MG/ML
INJECTION, SOLUTION INTRAVENOUS CONTINUOUS
Status: CANCELLED | OUTPATIENT
Start: 2022-08-29

## 2022-08-29 RX ORDER — ONDANSETRON 2 MG/ML
8 INJECTION INTRAMUSCULAR; INTRAVENOUS
Status: CANCELLED | OUTPATIENT
Start: 2022-08-29

## 2022-08-29 RX ADMIN — SODIUM CHLORIDE 200 MG: 9 INJECTION, SOLUTION INTRAVENOUS at 10:38

## 2022-08-29 ASSESSMENT — ENCOUNTER SYMPTOMS
EYE ITCHING: 0
DIARRHEA: 0
NAUSEA: 1
ABDOMINAL PAIN: 0
CONSTIPATION: 1
SORE THROAT: 0
COUGH: 1
VOICE CHANGE: 1
TROUBLE SWALLOWING: 0
SHORTNESS OF BREATH: 1
VOMITING: 0
EYE DISCHARGE: 0
WHEEZING: 0

## 2022-08-29 NOTE — PROGRESS NOTES
Progress Note      Pt Name: Dalton Laws  YOB: 1945  MRN: [id number]    Date of evaluation: 8/29/2022  History Obtained From:  Patient, EMR    Portions of this note have been copied forward, however, changed to reflect the most current clinical status of this patient. 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 He is status post induction chemoimmunotherapy. Lucía Fabian returns to the clinic, accompanied by his wife, to discuss imaging studies to assess treatment response. He is also scheduled for maintenance pembrolizumab. He is tolerating treatment with fatigue and anemia. Lucía Fabian reports exertional dyspnea. He has chronic cough. He chronic voice hoarseness, will be starting speech therapy soon. No diarrhea. No skin rashes. Chronic thoracic spine pain, old work injury. Followed by pain management, currently taking tramadol every a.m., ibuprofen 400 mg at noon and ibuprofen 600 mg every p.m. No area of uptake in this area on bone scan 6/29/2022. Weight is improved. Blood pressure mildly low, 99/70. No dizziness. Dalton Laws presents for follow-up surveillance visit and toxicity assessment. he requires intenstive monitoring due to the potential to cause serious morbidity or death. Diagnosis  Squamous cell carcinoma, Left lung, Nov 2021  [id number], stage IV (left thyroid nodule, skin metastasis)  11/5/21-PD-L1 Expressed 35%     Treatment Summary  12/20/21- Initiated Carboplatin AUC 5 Paclitaxel 175mg/m2 and Keytruda    3/14/22 Initiated maintenance Pembrolizumab 200mg every 3 weeks     Cancer History  Rosaura Butcher was first seen by me on 11/15/2021. He was referred by pulmonary at [facility] for a diagnosis of lung cancer. He developed complaints of cough.   Chest x-ray was performed and showed a lung mass. Patient is a currently smoker. He lost about 25 pounds. 9/28/21 CXR [facility]): Questionable mass density seen only on the lateral view with patchy density in left upper lobe. Further evaluation with computed tomography is recommended. COPD.   10/4/21 CT chest [facility]): Mass lesion left hilum extending caudally along the mediastinum and inseparable from the mediastinum in this region. This is highly suspicious for malignancy. Consolidation and/or neoplastic mass left upper lobe as described above Emphysema. New right thyroid nodule, probably solid. 2 chronic cysts within the liver. 10/22/21- PET scan [facility], [facility], [place], operated by [facility]): Essentially, mass in the right side of the neck of the neck base measuring 2.8 cm x 2.4 cm. SUV 18.9. Also small focus soft tissue of the back at the base of the neck on the right. Lesion measured 9 mm. SUV 10.5. Large mass in the left hilum measures 5.1 x 4.2 cm (SUV 18.6). Extensive parenchymal abnormality with a high SUV uptake in the left upper lobe. Apical component with a small amount of cavitation measuring 2.9 x 2.6 cm with SUV 11.3.  11/5/21 Lung, left mainstem bronchus biopsy with touch imprint smears: Invasive keratinizing squamous cell carcinoma. Lung, fine-needle aspiration of left hilar mass, ThinPrep and cell block: Keratinizing squamous cell carcinoma. Lung, left mainstem bronchial washing, ThinPrep and cell block: Keratinizing squamous cell carcinoma. 11/15/2021-he was first seen by me. Recommended biopsy of right neck mass and also subcutaneous nodule right upper back. Discussed with ENT, Dr. Debbie Lemon. 11/17/2021 Final Diagnosis: Excisional biopsy of a metastatic nodul in the thyroid gland as well as metastatic skin nodule. Right posterior lower neck\": Metastatic keratinizing squamous cell carcinoma involving fibroadipose tissue.  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  11/24/2021 MRI Brain W Wo Contrast No acute intracranial abnormality, mass or acute infarction. Chronic ischemic and atrophic changes. 12/6/2021-essentially, stage IV disease. Squamous cell carcinoma PDL 35% metastatic left upper back skin nodule consistent with squamous cell carcinoma. Left thyroid mass consistent with metastatic, cell carcinoma. Recommended frontline chemotherapy with carboplatin AUC 5, paclitaxel 175 mg/m², Keytruda every 3 weeks x4 cycles to be followed by maintenance Keytruda if stable disease. 12/9/2021 2D Echo Normal left ventricular size and systolic function EF 55 to 60%. 12/20/2021-initiation of palliative chemotherapy with carboplatin/Taxol/Keytruda. 2/18/22 CT CHEST W CONTRAST A mid to lower left hilar mass is smaller in size. Specifically,a central mass in the lower left hilum now measures 5 x 3.2 cm and previously measured 6 x 4.5 cm. Somewhat curvilinear opacities in both upper lobes, worsening on the left and developing on the right when compared to the prior study. There is an increasing area of similar opacity in the left upper lobe located slightly more superiorly now measuring 2.2 x 1.4 cm and previously measuring about 1.2 x 1.5 cm. There is another new area that has developed more inferiorly in the left upper lobe measuring 1.6 x 1 cm. There are similar appearing new developing  smewhat linear opacities in the right upper lobeGiven the curvilinear appearance and fine calcifications, progressive massive fibrosis is the leading differential. Infectious etiology cannot be ruled out, including atypical infections, such as TB. Pulmonary consultation recommended for this finding. Neoplastic disease is felt to be less likely given the appearance and bilateral upper lobe involvement. There is a stable subpleural right upper lobe nodule measuring 4-5 mm. Severe centrilobular emphysema. Atheromatous disease of the thoracic aorta and coronary arteries.  No mediastinal lymphadenopathy is seen. Small densities in the trachea on the right and posteriorly are likely retained secretions. Interim right thyroidectomy. 2/21/2022-essentially, interval response to treatment. Continue carboplatin/Taxol and Keytruda  3/14/22 Initiated maintenance Pembrolizumab 200mg every 3 weeks  3/14/2022 FL Esophagram Complete ([facility])  No esophageal mass or stricture. Prominent persistent contraction of the cricopharyngeus muscle with indentation of the posterior wall of the upper esophagus. Esophageal dysmotility with delayed clearance. 4/13/2022 CT Chest W Contrast New ill-defined 1.6 cm spiculated pleural-based opacity in the left lower lobe medially image 94 series 4. This could be neoplastic. Infection and inflammation are also considered. Central lower left hilar area of mass effect is smaller on the current study. Stable small right upper lobe pulmonary nodule. Worsening consolidation in the right upper lobe. This is likely infectious or inflammatory. Curvilinear opacities in the left upper lobe appear relatively stable and may also be infectious or inflammatory. Please see the neck, abdomen and pelvis CT report separately. 4/13/2022 CT Abd/Pelvis W IV Contrast (Oral) New ill-defined 1.6 cm spiculated pleural-based opacity in the left lower lobe medially image 94 series 4. This could be neoplastic. Infection and inflammation are also considered. Central lower left hilar area of mass effect is smaller on the current study. Stable small right upper lobe pulmonary nodule. Worsening consolidation in the right upper lobe. This is likely infectious or inflammatory. Curvilinear opacities in the left upper lobe appear relatively stable and may also be infectious or inflammatory. Please see the neck, abdomen and pelvis CT report separately. 4/13/2022 CT Soft Tissue Neck W Contrast No pathologically enlarged cervical chain or posterior triangle lymphadenopathy is present.  There is asymmetry at the level of the right vocal fold with asymmetric soft tissue density and adduction of the right vocal fold. Correlation is recommended with the patient's history. Those portions of the brain which are visualized are normal in appearance. Please see previously dictated CT of the chest report for findings within both upper lobes. The right lobe of the thyroid gland is not visualized and I suspect is surgically absent. 5/25/2022-essentially, findings of mixed response in the chest.  Continue Keytruda. Repeat CT chest in 2 months. 6/15/2022 CT Chest W Contrast  COPD. Central lobular emphysema with multiple bullae formation at the apices. Multiple ill-defined Spiculated masses, the largest of which measures 7.0 x 3.3 cm in the right super hilar region, indeterminate in nature. Differential consideration would include multifocal neoplasm versus interstitial scarring/fibrosis. Please correlate clinically. Bronchiectatic changes in the lower lobes bilaterally with suspected consolidation of the left lower lobe. Please correlate clinically 1.0 cm calcified right subcarinal node. 6/15/2022 CT Abd/Pelvis W IV Contrast (Oral) 3.3 x 1.7 cm fat containing left inguinal hernia. 3.7 x 3.3 cm mild aneurysmal dilatation of the distal abdominal aorta with circumferential mural thrombus. The appendix is not identified. Multiple simple hepatic cysts. Bilateral simple renal cysts. Recommendation: Follow up as clinically indicated. 6/17/2022-he was seen by pulmonary referred for new finding the left lower lobe. He underwent a bronchoscopy and biopsy Lesion, left lower lobe bronchus, biopsies: No malignancy identified. Benign bronchial mucosa. 6/17/2022 Lung, left lower lobe bronchoalveolar lavage: Blood admixed with very rare degenerated benign appearing epithelial cells. 6/27/2022- I reviewed results CT chest abdomen pelvis. The findings in the CT chest seems to be stable.   Unfortunately, the radiologist did not specify his impression whether this is better worse or stable. I did my best to reach out to radiology on 6/27/2022 and I am waiting a callback. Continue Keytruda for now.  6/29/2022 NM Bone Scan A tiny focus of upatke is in the soft tissues right mid thigh region. Foci of increased tracer accumulation are in the left 3rd, 4th and 5thleft ribs. These areas correspond to sclerotic foci is seen in the ribcage on CT chest dated 6/15/2022. The pattern of uptake abd sclerosis suggest remote posttraumatic changes with metastatic disease being less likely. 8/8/2022-patient tolerating treatment with Keytruda well.   8/23/2022-CT CAP Mixed findindgs. Improvement on the right and improvement in most left lung nodules.   The left infrahilar soft tissue mass enlarged with obstructive atelectasis, Refer to Dr. Lore Adams   plan repeat CT chest 8 weeks following completion of XRT (need CT scan premeds)     Past Medical History:    Past Medical History        Past Medical History:   Diagnosis Date    CAD (coronary artery disease)       RUFINO to LCX 1/13    Hyperlipidemia      Hypertension      Lung cancer ([address]      Nicotine dependence, cigarettes, w unsp disorders 12/1/2017    Prostate cancer ([facility]      PVD (peripheral vascular disease) ([facility]      Tobacco abuse              Past Surgical History:    Past Surgical History         Past Surgical History:   Procedure Laterality Date    BRONCHOSCOPY Left 11/5/2021     BRONCHOSCOPY ENDOBRONCHIAL ULTRASOUND performed by Stephanie Franco MD at [facility]    BRONCHOSCOPY Left 6/17/2022     BRONCHOSCOPY performed by Stephanie Franco MD at [facility]   02/02/2013     EF 65%, patent stent to LCX, no new occlusive disease    CATARACT REMOVAL WITH IMPLANT Bilateral 2014    CORONARY ANGIOPLASTY   01/28/2013     RUFINO to [facility] VASCULAR STENT   2013    LUNG BIOPSY   11/05/2021    PROSTATE SURGERY        SHOULDER SURGERY Left              Social History: 12 hrs before CTs and 1 hr before CTs 3 tablet 0    melatonin 3 MG TABS tablet Take 3 mg by mouth nightly as needed      megestrol (MEGACE) 40 MG/ML suspension Take 10 mLs by mouth daily 480 mL 5    metoprolol tartrate (LOPRESSOR) 25 MG tablet Take 1 tablet by mouth 2 times daily 180 tablet 3    gabapentin (NEURONTIN) 100 MG capsule Take 1 capsule by mouth 2 times daily for 30 days. 60 capsule 0    ondansetron (ZOFRAN) 4 MG tablet Take 1 tablet by mouth daily as needed for Nausea or Vomiting 30 tablet 0    nitroGLYCERIN (NITROSTAT) 0.4 MG SL tablet Place 1 tablet under the tongue every 5 minutes as needed for Chest pain As directed 25 tablet 5    ezetimibe (ZETIA) 10 MG tablet Take 10 mg by mouth daily      montelukast (SINGULAIR) 10 MG tablet Take 10 mg by mouth nightly      rosuvastatin (CRESTOR) 10 MG tablet Take 20 mg by mouth daily       calcium citrate (CALCITRATE) 950 MG tablet Take 1 tablet by mouth daily (Patient not taking: Reported on 6/17/2022)      metaxalone (SKELAXIN) 800 MG tablet Take 800 mg by mouth      traMADol (ULTRAM) 50 MG tablet Take 50 mg by mouth. [place] omeprazole (PRILOSEC) 20 MG delayed release capsule Take 20 mg by mouth in the morning and at bedtime       Multiple Minerals-Vitamins (CALCIUM & VIT D3 BONE HEALTH PO) Take by mouth (Patient not taking: Reported on 6/17/2022)      Cholecalciferol (VITAMIN D3) 2000 UNITS CAPS Take by mouth      vitamin B-12 (CYANOCOBALAMIN) 1000 MCG tablet Take 1,000 mcg by mouth daily      aspirin 81 MG tablet Take 81 mg by mouth 2 times daily      folic acid (FOLVITE) 1 MG tablet Take 1 mg by mouth daily      Fluticasone Furoate-Vilanterol (BREO ELLIPTA) 200-25 MCG/INH AEPB Inhale into the lungs      azelastine HCl 0.15 % SOLN by Nasal route      cilostazol (PLETAL) 100 MG tablet Take 100 mg by mouth 2 times daily       No current facility-administered medications for this visit.       Allergies   Allergen Reactions    Zithromax [Azithromycin]      abd pain.     Penicillins Rash     And pain      Shellfish-Derived Products Rash     Social History     Tobacco Use    Smoking status: Former     Packs/day: 0.25     Years: 60.00     Pack years: 15.00     Types: Cigarettes     Quit date: 2022     Years since quittin.4    Smokeless tobacco: Never   Vaping Use    Vaping Use: Never used   Substance Use Topics    Alcohol use: No    Drug use: No     Family History   Problem Relation Age of Onset    Hypertension Mother     Stroke Mother     Cancer Brother 61        Lung    Cancer Brother 79        Prostate     Review of Systems   Constitutional:  Positive for activity change and fatigue. Negative for fever. HENT:  Positive for voice change (hoarseness). Negative for dental problem, hearing loss, mouth sores, nosebleeds, sore throat and trouble swallowing. Eyes:  Negative for discharge and itching. Respiratory:  Positive for cough (dry) and shortness of breath (exertional). Negative for wheezing. Cardiovascular:  Negative for chest pain, palpitations and leg swelling. Gastrointestinal:  Positive for constipation and nausea (rare). Negative for abdominal pain, diarrhea and vomiting. Endocrine: Negative for cold intolerance and heat intolerance. Genitourinary:  Negative for dysuria, frequency, hematuria and urgency. H/o prostate cancer    Musculoskeletal:  Positive for arthralgias. Negative for joint swelling and myalgias. Skin:  Negative for pallor and rash. Allergic/Immunologic: Negative for environmental allergies and immunocompromised state. Neurological:  Negative for seizures, syncope and numbness. Hematological:  Negative for adenopathy. Does not bruise/bleed easily. Psychiatric/Behavioral:  Negative for agitation, behavioral problems and confusion. Physical Exam  Vitals reviewed. Constitutional:       General: He is not in acute distress. Appearance: He is well-developed. He is not toxic-appearing or diaphoretic. Comments: Wearing a facial mask. Thin. HENT:      Head: Normocephalic and atraumatic. Right Ear: External ear normal.      Left Ear: External ear normal.      Nose: Nose normal.      Mouth/Throat:      Mouth: Mucous membranes are moist.   Eyes:      General: No scleral icterus. Right eye: No discharge. Left eye: No discharge. Conjunctiva/sclera: Conjunctivae normal.   Neck:      Trachea: No tracheal deviation. Cardiovascular:      Rate and Rhythm: Normal rate and regular rhythm. Pulmonary:      Effort: Pulmonary effort is normal. No respiratory distress. Breath sounds: Examination of the left-lower field reveals decreased breath sounds. Decreased breath sounds present. No wheezing or rales. Chest:   Breasts:     Right: No supraclavicular adenopathy. Left: No supraclavicular adenopathy. Abdominal:      General: Bowel sounds are normal. There is no distension. Palpations: Abdomen is soft. Tenderness: There is no abdominal tenderness. There is no guarding. Genitourinary:     Comments: Exam deferred  Musculoskeletal:         General: No tenderness or deformity. Cervical back: Neck supple. No muscular tenderness. Comments: Normal ROM all four extremities. Thoracic spine tenderness   Lymphadenopathy:      Cervical:      Right cervical: No superficial or deep cervical adenopathy. Left cervical: No superficial or deep cervical adenopathy. Upper Body:      Right upper body: No supraclavicular adenopathy. Left upper body: No supraclavicular adenopathy. Comments:      Skin:     General: Skin is warm and dry. Findings: No rash. Neurological:      Mental Status: He is alert and oriented to person, place, and time. Comments: follows commands, non-focal   Psychiatric:         Behavior: Behavior normal. Behavior is cooperative. Thought Content:  Thought content normal.         Judgment: Judgment normal.      Comments: Alert and oriented to person, place and time. Vitals:    08/29/22 0934   BP: 99/70   Pulse: 80   Resp: 18   Temp: 98.1 °F (36.7 °C)   SpO2: 98%   Weight: 150 lb (68 kg)   Height: 5' 7\" (1.702 m)      Wt Readings from Last 3 Encounters:   08/29/22 150 lb (68 kg)   08/08/22 150 lb 12.8 oz (68.4 kg)   07/18/22 144 lb 12.8 oz (65.7 kg)     Vitals:    08/29/22 0934   BP: 99/70   Pulse: 80   Resp: 18   Temp: 98.1 °F (36.7 °C)   SpO2: 98%     CT CHEST W CONTRAST    Result Date: 8/25/2022  1. Interval improvement in the right lung. 2. Interval worsening and enlarging masses in nodules within the left lung. 3. Small pericardial effusion. 4. Stable liver cyst.  Appearance for cholelithiasis and possible choledocholithiasis. Similar appearance detected on prior CT abdomen and pelvis dated 06/15/2022. If not previously performed, correlate with right upper quadrant ultrasound and noncontrast MRI abdomen/MRCP as clinically indicated. Recommendation: Follow up as clinically indicated. All CT scans at this facility utilize dose modulation, iterative reconstruction, and/or weight based dosing when appropriate to reduce radiation dose to as low as reasonably achievable. Electronically Signed by Jessika Westbrook MD at 25-Aug-2022 04:58:39 PM             CT ABDOMEN PELVIS W IV CONTRAST Additional Contrast? Oral    Result Date: 8/26/2022   1. Spiculated irregular mass in lateral basal segment of left lower lobe, likely known squamous cell carcinoma of left lung. Additional abnormal soft tissue within the posterior mediastinum consistent with pathologic adenopathy. Mild-moderate pericardial fluid. 2. Mild aneurysmal dilatation of infrarenal abdominal aorta with circumferential eccentric thrombus. 3. Other nonacute findings above. Multiple tiny calcified splenic granulomas.  As compared to previous CT scan dated 15th June 2022, there are new findings in the form of mild to moderate pericardial effusion and irregular spiculated mass involving lateral basal segment of left lower lobe. Rest of the findings remains unchanged  Recommendation: Follow up as clinically indicated. All CT scans at this facility utilize dose modulation, iterative reconstruction, and/or weight based dosing when appropriate to reduce radiation dose to as low as reasonably achievable. Electronically Signed by Evelia Montoya MD at 26-Aug-2022 10:58:01 AM                CBC:   Lab Results   Component Value Date    WBC 11.51 (H) 08/29/2022    HGB 10.4 (L) 08/29/2022    HCT 34.3 (L) 08/29/2022    MCV 98.3 (H) 08/29/2022     08/29/2022     CMP:  Lab Results   Component Value Date     08/29/2022    K 4.3 08/29/2022     08/29/2022    CO2 24 08/29/2022    BUN 14 08/29/2022    CREATININE 0.9 08/29/2022    GLUCOSE 92 08/29/2022    CALCIUM 9.0 08/29/2022    PROT 6.9 08/29/2022    LABALBU 3.8 08/29/2022    BILITOT 0.4 08/29/2022    ALKPHOS 94 08/29/2022    AST 23 08/29/2022    ALT 20 (L) 08/29/2022    LABGLOM >60 08/29/2022    GFRAA >60 11/24/2021    GLOB 3.0 08/29/2022     CT Chest w contrast 8/23/2022 at [facility]:   Lungs:  Right:  Right paramediastinal mass with calcifications 3.6 x 2.7 cm, resolving. There is an adjacent nodule at the posterior right lung apex measuring 1.4 cm, resolving. There is pleural thickening and atelectasis in the right paramediastinal region. Left:  Enlarging Left infrahilar soft tissue mass 6.0 x 5.7 cm, previously 5.4 x 4.2 cm with obstructive atelectasis. The mass continues to abut the left atrium. There is a loss of fat plane between the mass in the left atrium suggesting invasion. Left major fissure intra fissural nodule 8 mm appears larger. The multiple  following nodules are resolving:  Left upper lobe nodule 9 mm series 4, image 32. Left upper lobe posterior spiculated nodule 5.5 x 2.0 cm series 4, image 36. Two left lower lobe spiculated nodules. Subpleural nodule 3.6 x 4.2 cm. Adjacent left lower lobe nodule 3.6 x 2.3 cm.   Left weeks in treatment room  Findings/plan of care/discussed with patient and spouse. All questions answered. Orders Placed This Encounter   Procedures    CBC with Auto Differential    Comprehensive Metabolic Panel    TSH    External Referral To Radiation Oncology     Return in about 3 weeks (around 9/19/2022) for Dr Yuni Gamboa in treatment room Makenna Graves . I have seen, examined and reviewed this patient medication list, appropriate labs and imaging studies. I reviewed relevant medical records and others physicians notes. I discussed the plan of care with the patient. I answered all questions to the patients satisfaction. I have also reviewed the chief complaint (CC) and part of the history (History of Present Illness (HPI), Past Family Social History [facility]), or Review of Systems (ROS) and made changes when appropriated. Dictated utilizing Dragon transcription software.         Khris Zenaida, APRN  5:06 PM  8/29/2022

## 2022-09-19 ENCOUNTER — HOSPITAL ENCOUNTER (OUTPATIENT)
Dept: INFUSION THERAPY | Age: 77
Discharge: HOME | End: 2022-09-19
Payer: OTHER GOVERNMENT

## 2022-09-19 VITALS
BODY MASS INDEX: 22.32 KG/M2 | OXYGEN SATURATION: 97 % | TEMPERATURE: 97.7 F | WEIGHT: 142.2 LBS | HEART RATE: 69 BPM | RESPIRATION RATE: 18 BRPM | SYSTOLIC BLOOD PRESSURE: 128 MMHG | DIASTOLIC BLOOD PRESSURE: 83 MMHG | HEIGHT: 67 IN

## 2022-09-19 DIAGNOSIS — C34.92 SQUAMOUS CELL CARCINOMA OF LEFT LUNG (HCC): ICD-10-CM

## 2022-09-19 DIAGNOSIS — R53.83 FATIGUE DUE TO TREATMENT: Primary | ICD-10-CM

## 2022-09-19 DIAGNOSIS — R53.83 FATIGUE DUE TO TREATMENT: ICD-10-CM

## 2022-09-19 DIAGNOSIS — C34.92 SQUAMOUS CELL CARCINOMA OF LEFT LUNG (HCC): Primary | ICD-10-CM

## 2022-09-19 LAB
ALBUMIN SERPL-MCNC: 4.3 G/DL (ref 3.5–5.2)
ALP BLD-CCNC: 99 U/L (ref 40–130)
ALT SERPL-CCNC: 19 U/L (ref 21–72)
ANION GAP SERPL CALCULATED.3IONS-SCNC: 14 MMOL/L (ref 7–19)
AST SERPL-CCNC: 28 U/L (ref 17–59)
BILIRUB SERPL-MCNC: 0.4 MG/DL (ref 0.2–1.3)
BUN BLDV-MCNC: 22 MG/DL (ref 9–20)
CALCIUM SERPL-MCNC: 9.7 MG/DL (ref 8.4–10.2)
CHLORIDE BLD-SCNC: 102 MMOL/L (ref 98–111)
CO2: 25 MMOL/L (ref 22–29)
CORTISOL TOTAL: 0.6 UG/DL
CREAT SERPL-MCNC: 0.8 MG/DL (ref 0.6–1.2)
GFR NON-AFRICAN AMERICAN: >60
GLOBULIN: 3.7 G/DL
GLUCOSE BLD-MCNC: 94 MG/DL (ref 74–106)
HCT VFR BLD CALC: 39 % (ref 40.1–51)
HEMOGLOBIN: 11.9 G/DL (ref 13.7–17.5)
LYMPHOCYTES ABSOLUTE: 1.26 K/UL (ref 1.18–3.74)
LYMPHOCYTES RELATIVE PERCENT: 8.3 % (ref 19.3–53.1)
MCH RBC QN AUTO: 29.6 PG (ref 25.7–32.2)
MCHC RBC AUTO-ENTMCNC: 30.5 G/DL (ref 32.3–36.5)
MCV RBC AUTO: 97 FL (ref 79–92.2)
MONOCYTES ABSOLUTE: 1.3 K/UL (ref 0.24–0.82)
MONOCYTES RELATIVE PERCENT: 8.5 % (ref 4.7–12.5)
NEUTROPHILS ABSOLUTE: 12.5 K/UL (ref 1.56–6.13)
NEUTROPHILS RELATIVE PERCENT: 81.9 % (ref 34–71.1)
PDW BLD-RTO: 16.2 % (ref 11.6–14.4)
PLATELET # BLD: 298 K/UL (ref 163–337)
PMV BLD AUTO: 9 FL (ref 7.4–10.4)
POTASSIUM SERPL-SCNC: 4.5 MMOL/L (ref 3.5–5.1)
RBC # BLD: 4.02 M/UL (ref 4.63–6.08)
SODIUM BLD-SCNC: 141 MMOL/L (ref 137–145)
TOTAL PROTEIN: 7.9 G/DL (ref 6.3–8.2)
TSH SERPL DL<=0.05 MIU/L-ACNC: 3.83 UIU/ML (ref 0.27–4.2)
WBC # BLD: 15.26 K/UL (ref 4.23–9.07)

## 2022-09-19 PROCEDURE — 85025 COMPLETE CBC W/AUTO DIFF WBC: CPT

## 2022-09-19 PROCEDURE — 2580000003 HC RX 258: Performed by: INTERNAL MEDICINE

## 2022-09-19 PROCEDURE — 80053 COMPREHEN METABOLIC PANEL: CPT

## 2022-09-19 PROCEDURE — 96413 CHEMO IV INFUSION 1 HR: CPT

## 2022-09-19 PROCEDURE — 36415 COLL VENOUS BLD VENIPUNCTURE: CPT

## 2022-09-19 PROCEDURE — 6360000002 HC RX W HCPCS: Performed by: INTERNAL MEDICINE

## 2022-09-19 RX ORDER — FAMOTIDINE 10 MG/ML
20 INJECTION, SOLUTION INTRAVENOUS
Status: CANCELLED | OUTPATIENT
Start: 2022-09-19

## 2022-09-19 RX ORDER — SODIUM CHLORIDE 9 MG/ML
5-40 INJECTION INTRAVENOUS PRN
Status: CANCELLED | OUTPATIENT
Start: 2022-09-19

## 2022-09-19 RX ORDER — EPINEPHRINE 1 MG/ML
0.3 INJECTION, SOLUTION, CONCENTRATE INTRAVENOUS PRN
Status: CANCELLED | OUTPATIENT
Start: 2022-09-19

## 2022-09-19 RX ORDER — SODIUM CHLORIDE 0.9 % (FLUSH) 0.9 %
5-40 SYRINGE (ML) INJECTION PRN
Status: CANCELLED | OUTPATIENT
Start: 2022-09-19

## 2022-09-19 RX ORDER — SODIUM CHLORIDE 9 MG/ML
25 INJECTION, SOLUTION INTRAVENOUS PRN
Status: CANCELLED | OUTPATIENT
Start: 2022-09-19

## 2022-09-19 RX ORDER — HEPARIN SODIUM (PORCINE) LOCK FLUSH IV SOLN 100 UNIT/ML 100 UNIT/ML
500 SOLUTION INTRAVENOUS PRN
Status: CANCELLED | OUTPATIENT
Start: 2022-09-19

## 2022-09-19 RX ORDER — MEPERIDINE HYDROCHLORIDE 50 MG/ML
12.5 INJECTION INTRAMUSCULAR; INTRAVENOUS; SUBCUTANEOUS PRN
Status: CANCELLED | OUTPATIENT
Start: 2022-09-19

## 2022-09-19 RX ORDER — SODIUM CHLORIDE 9 MG/ML
20 INJECTION, SOLUTION INTRAVENOUS ONCE
Status: CANCELLED | OUTPATIENT
Start: 2022-09-19 | End: 2022-09-19

## 2022-09-19 RX ORDER — DIPHENHYDRAMINE HYDROCHLORIDE 50 MG/ML
50 INJECTION INTRAMUSCULAR; INTRAVENOUS
Status: CANCELLED | OUTPATIENT
Start: 2022-09-19

## 2022-09-19 RX ORDER — ONDANSETRON 2 MG/ML
8 INJECTION INTRAMUSCULAR; INTRAVENOUS
Status: CANCELLED | OUTPATIENT
Start: 2022-09-19

## 2022-09-19 RX ORDER — ALBUTEROL SULFATE 90 UG/1
4 AEROSOL, METERED RESPIRATORY (INHALATION) PRN
Status: CANCELLED | OUTPATIENT
Start: 2022-09-19

## 2022-09-19 RX ORDER — ACETAMINOPHEN 325 MG/1
650 TABLET ORAL
Status: CANCELLED | OUTPATIENT
Start: 2022-09-19

## 2022-09-19 RX ORDER — SODIUM CHLORIDE 9 MG/ML
INJECTION, SOLUTION INTRAVENOUS CONTINUOUS
Status: CANCELLED | OUTPATIENT
Start: 2022-09-19

## 2022-09-19 RX ADMIN — SODIUM CHLORIDE 200 MG: 9 INJECTION, SOLUTION INTRAVENOUS at 10:17

## 2022-09-20 PROBLEM — Z87.891 FORMER SMOKER: Status: ACTIVE | Noted: 2022-01-01

## 2022-09-20 NOTE — PROGRESS NOTES
[facility]ia [facility] MD      Erlin Calero APRN  ____________________________________________________________               [facility]  Department of Radiation Oncology  [address] [place] [id number]  Office:  [phone]  Fax: [phone]    DATE: 09/21/2022  PATIENT: Chucky Jamison 1945   Medical record #: [id number]                                                     REASON FOR CONSULTATION:   Chief Complaint   Patient presents with   • Lung Cancer     Chucky Jamison is a 77 y.o. male that has been referred to our clinic to be evaluated for consideration of radiotherapy to the lung. Reports chronic back pain. Denies activity change, appetite change, unexpected change, nausea/vomiting, diarrhea, light-headedness, weakness, and headaches. He follows .            HISTORY OF PRESENT ILLNESS    09/2021 - Developed complaints of cough. Chest x-ray was performed revealing a lung mass.         *Patient has a history of pelvic radiotherapy for prostate cancer treatment many years ago       in [facility] under the care of of Arnoldo Donald.    09/28/2021 - Chest x-ray:  • Questionable mass density seen only on the lateral view with patchy density in left upper lobe. Further evaluation with computed tomography is recommended.   • COPD.     10/04/2021 - CT chest with contrast:  • Mass lesion left hilum extending caudally along the mediastinum and inseparable from the mediastinum in this region. This is highly suspicious for malignancy. Consolidation and/or neoplastic mass left upper lobe as described above Emphysema.   • New right thyroid nodule, probably solid.   • 2 chronic cysts within the liver.     10/22/2021- PET scan:   • Essentially, mass in the right side of the neck of the neck base measuring 2.8 cm x 2.4 cm. SUV 18.9.   • Also small focus soft tissue of the back at the base of the neck on the right. Lesion measured 9 mm. SUV 10.5.   • Large mass  in the left hilum measures 5.1 x 4.2 cm (SUV 18.6).   • Extensive parenchymal abnormality with a high SUV uptake in the left upper lobe.   • Apical component with a small amount of cavitation measuring 2.9 x 2.6 cm with SUV 11.3.    11/05/2021 - Bronchoscopy with biopsy:   • Lung, left mainstem bronchus biopsy with touch imprint smears:   o Invasive keratinizing squamous cell carcinoma.   • Lung, fine-needle aspiration of left hilar mass, ThinPrep and cell block:   o Keratinizing squamous cell carcinoma.   • Lung, left mainstem bronchial washing, ThinPrep and cell block:   o Keratinizing squamous cell carcinoma.     11/15/2021 - Consult with :  • Recommended biopsy of right neck mass and also subcutaneous nodule right upper back.   • Discussed with ENT, Dr. Blair Walsh.    11/17/2021 - Excisional biopsy of a nodule in the thyroid gland and skin nodule:  • Right posterior lower neck:   o Metastatic keratinizing squamous cell carcinoma involving fibroadipose tissue.   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    11/24/2021 - MRI Brain with and without contrast:  • No acute intracranial abnormality, mass or acute infarction.  • Chronic ischemic and atrophic changes.    12/06/2021 - Appointment with :  • Essentially, stage IV disease. Squamous cell carcinoma PDL 35% metastatic left upper back skin nodule consistent with squamous cell carcinoma.   • Left thyroid mass consistent with metastatic, cell carcinoma.   • Recommended frontline chemotherapy with carboplatin AUC 5, paclitaxel 175 mg/m², Keytruda every 3 weeks x4 cycles to be followed by maintenance Keytruda if stable disease.    12/20/2021 - 03/2022 - Chemotherapy course:  • Carboplatin AUC 5 Paclitaxel 175mg/m2 and Keytruda     02/18/2022 - CT Chest with contrast:  • A mid to lower left hilar mass is smaller in size.   • Specifically, a central mass in the lower left hilum now measures 5 x 3.2  cm and prevously measured 6 x 4.5 cm. Somewhat curvilinear opacities in both upper lobes, worsening on the left and developing on the right when compared to the prior study. There is an increasing area of similar opacity in the left upper lobe located slightly more superiorly now measuring 2.2 x 1.4 cm and previously measuring about 1.2 x 1.5 cm.   • There is another new area that has developed more inferiorly in the left upper lobe measuring 1.6 x 1 cm.   • There are similar appearing new developing smewhat linear opacities in the right upper lobe. Given the curvilinear appearance and fine calcifications, progressive massive fibrosis is the leading differential. Infectious etiology cannot be ruled out, including atypical infections, such as TB. Pulmonary consultation recommended for this finding. Neoplastic disease is felt to be less likely given the appearance and bilateral upper lobe involvement.   • There is a stable subpleural right upper lobe nodule measuring 4-5 mm.  • Severe centrilobular emphysema.   • Atheromatous disease of the thoracic aorta and coronary arteries.   • No mediastinal lymphadenopathy is seen.   • Small densities in the trachea on the right and posteriorly are likely retained secretions.   • Interim right thyroidectomy.     02/21/2022 - Appointment with :  • Essentially, interval response to treatment.   • Continue carboplatin/Taxol and Keytruda    03/14/2022 - Initiated maintenance Pembrolizumab 200mg every 3 weeks    04/13/2022 - CT Chest with contrast:  • New ill-defined 1.6 cm spiculated pleural-based opacity in the left lower lobe medially image 94 series 4. This could be neoplastic.Infection and inflammation are also considered.Central lower left hilar area of mass effect is smaller on the current study. Stable small right upper lobe pulmonary nodule.Worsening consolidation in the right upper lobe. This is likely infectious or inflammatory. Curvilinear opacities in the left  upper lobe appear relatively stable and may also be infectious or inflammatory.     04/13/2022 - CT Abdomen/Pelvis with contrast:  • New ill-defined 1.6 cm spiculated pleural-based opacity in the left lower lobe medially image 94 series 4. This could be neoplastic. Infection and inflammation are also considered.Central lower left hilar area of mass effect is smaller on the current study. Stable small right upper lobe pulmonary nodule.Worsening consolidation in the right upper lobe. This is likely infectious or inflammatory. Curvilinear opacities in the left upper lobe appear relatively stable and may also be infectious or inflammatory. Please see the neck, abdomen and pelvis CT report separately.    04/13/2022 - CT Soft Tissue Neck W Contrast:  • No pathologically enlarged cervical chain or posterior triangle lymphadenopathy is present.   • There is asymmetry at the level of the right vocal fold with asymmetric soft tissue density and adduction of the right vocal fold.Correlation is recommended with the patient's history. Those portions of the brain which are visualized are normal in appearance. Please see previously dictated CT of the chest report for findings within both upper lobes.  • The right lobe of the thyroid gland is not visualized and I suspect is surgically absent.    05/25/2022 - Appointment with :  • Essentially, findings of mixed response in the chest.   • Continue Keytruda.   • Repeat CT chest in 2 months.    06/15/2022 - CT Chest with Contrast:  • COPD.   • Central lobular emphysema with multiple bullae formation at the apices.Multiple ill-defined Spiculated masses, the largest of which measures 7.0 x 3.3 cm in the right super hilar region, indeterminate in nature. Differential consideration would include multifocal neoplasm versus interstitial scarring/fibrosis. Please correlate clinically.   • Bronchiectatic changes in the lower lobes bilaterally with suspected consolidation of the left  lower lobe. Please correlate clinically 1.0 cm calcified right subcarinal node.     06/15/2022 - CT Abdomen/Pelvis with contrast:  • 3.3 x 1.7 cm fat containing left inguinal hernia.3.7 x 3.3 cm mild aneurysmal dilatation of the distal abdominal aorta with circumferential mural thrombus. The appendix is not identified. Multiple simple hepatic cysts. Bilateral simple renal cysts.  • Recommendation: Follow up as clinically indicated.    06/17/2022 - Bronchoscopy with :  • Lesion, left lower lobe bronchus, biopsies:   o No malignancy identified.   o Benign bronchial mucosa.  • Lung, left lower lobe bronchoalveolar lavage:   o Blood admixed with very rare degenerated benign appearing epithelial cells.    06/29/2022 - Bone Scan:  • A tiny focus of upatke is in the soft tissues right mid thigh region. Foci of increased tracer accumulation are in the left 3rd, 4th and 5thleft ribs.   • These areas correspond to sclerotic foci is seen in the ribcage on CT chest dated 6/15/2022. The pattern of uptake abd sclerosis suggest remote posttraumatic changes with metastatic disease being less likely.    08/23/2022 - CT Chest with contrast:  • Interval improvement in the right lung.   • Interval worsening and enlarging masses in nodules within the left lung.   • Small pericardial effusion.   • Stable liver cyst.  Appearance for cholelithiasis and possible choledocholithiasis.  Similar appearance detected on prior CT abdomen and pelvis dated 06/15/2022.  If not previously performed, correlate with right upper quadrant ultrasound and   noncontrast MRI abdomen/MRCP as clinically indicated.     08/23/2022 - CT Abdomen/Pelvis with contrast:  • Spiculated irregular mass in lateral basal segment of left lower lobe, likely known squamous cell carcinoma of left lung.  Additional abnormal soft tissue within the posterior mediastinum consistent with pathologic adenopathy.  Mild-moderate   pericardial fluid.   • Mild aneurysmal  dilatation of infrarenal abdominal aorta with circumferential eccentric thrombus.   • Other nonacute findings above.  Multiple tiny calcified splenic granulomas.     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 Improvement on the right and improvement in most left lung nodules. The left infrahilar soft tissue mass has enlarged with obstructive atelectasis -discussed with Dr. Trujillo  • Recommend continue maintenance pembrolizumab today and every 3 weeks  • Refer to Dr. Asa Yuan to consider palliative XRT to the site of progression on the left with post-obstruction atelectasis    • plan repeat CT chest 8 weeks following completion of XRT (need CT scan premeds)  • CBC, CMP, TSH level today  • Hgb 10.4 stable, follow  • TSH   • Continue follow-up with Dr. Blair Walsh/[facility]NT -will be starting speech therapy  • Continue supportive medications, Megace, Zofran PRN, OTC Benadryl and/or Melatonin for sleep  • Continue Ativan 0.5mg every 8 hrs as needed  • May take Tramadol BID and alternate with Ibuprofen  • RTC with MD 3 weeks in treatment room  • Findings/plan of care/discussed with patient and spouse. All questions answered.  · Return in about 3 weeks (around 9/19/2022) for Dr Trujillo in treatment room [facility] .     History obtained from  PATIENT, FAMILY, and CHART    PAST MEDICAL HISTORY  Past Medical History:   Diagnosis Date   • Arthritis    • Cataract    • COPD (chronic obstructive pulmonary disease) (HCC)    • Coronary artery disease    • Disease of thyroid gland     LOWER LOBE REMOVED R/T CANCER   • Elevated cholesterol    • Emphysema lung (HCC)    • GERD (gastroesophageal  reflux disease)    • Heart disease    • Hypercholesteremia    • Hypertension    • Lung cancer (HCC)    • Mass of right side of neck    • Mass of soft tissue of neck    • Prostate cancer (HCC)       PAST SURGICAL HISTORY  Past Surgical History:   Procedure Laterality Date   • CARDIAC CATHETERIZATION     • CATARACT EXTRACTION WITH INTRAOCULAR LENS IMPLANT Bilateral    • CORONARY STENT PLACEMENT      2013 X1   • EXCISION MASS HEAD/NECK Right 2021    Procedure: Excisional biopsy of right posterior neck mass and excisional biopsy of right anterior neck mass (related to the inferior right thyroid lobe);  Surgeon: Blair Walsh MD;  Location: [facility] OR;  Service: ENT;  Laterality: Right;   • HERNIA REPAIR     • LARYNGOSCOPY WITH LARYNGEAL INJECTION N/A 2022    Procedure: DIRECT LARYNGOSCOPY WITH VOCAL CORD INJECTION WITH CYMETRA OR PROLARYN;  Surgeon: Blair Walsh MD;  Location: [facility] OR;  Service: ENT;  Laterality: N/A;   • LUNG BIOPSY     • PROSTATE SURGERY        PAST ONCOLOGIC HISTORY: Patient reports pelvic radiotherapy under the care of Dr. Arnoldo Donald in [facility] many years ago.  No records available for review of this treatment at time of at time of consultation.  He denies radiotherapy to other body sites.  He denies knowledge of vascular/connective tissue disorders or active rheumatologic disease.    FAMILY HISTORY  family history includes Cancer in his brother; Heart disease in his mother; No Known Problems in his father; Prostate cancer in his brother.    SOCIAL HISTORY  Social History     Tobacco Use   • Smoking status: Former Smoker     Packs/day: 0.50     Types: Cigarettes     Quit date: 2022     Years since quittin.6   • Smokeless tobacco: Never Used   • Tobacco comment: quit 2-3 months ago   Vaping Use   • Vaping Use: Never used   Substance Use Topics   • Alcohol use: No   • Drug use: No      ALLERGIES  Azithromycin, Penicillins, and Shellfish-derived products      MEDICATIONS  Current Outpatient Medications   Medication Sig Dispense Refill   • Aspirin 81 MG capsule aspirin     • azelastine (ASTELIN) 0.1 % nasal spray 1 spray into each nostril.     • BREO ELLIPTA 200-25 MCG/INH aerosol powder  INHALE 1 PUFF DAILY  1   • Calcium Carb-Cholecalciferol (CALCIUM 500 + D PO) Take 1 tablet by mouth Daily. UNABLE TO SWALLOW RIGHT NOW     • Cholecalciferol (VITAMIN D3) 2000 UNITS capsule Take 2 tablets by mouth Daily.     • cilostazol (PLETAL) 100 MG tablet Take 100 mg by mouth 2 (Two) Times a Day. HOLD for DOS-11/17/21  1   • Cyanocobalamin (VITAMIN B12) 1000 MCG tablet controlled-release Take 1,000 mcg by mouth Daily.     • ezetimibe (ZETIA) 10 MG tablet Take 10 mg by mouth Daily.     • folic acid (FOLVITE) 1 MG tablet Take 1 mg by mouth Daily.     • gabapentin (NEURONTIN) 300 MG capsule Take 300 mg by mouth 2 (Two) Times a Day.     • ibuprofen (ADVIL,MOTRIN) 200 MG tablet Take 200 mg by mouth 5 (Five) Times a Day. 2 at lunch 3 at bedtime     • megestrol (MEGACE) 40 MG/ML suspension      • melatonin 5 MG tablet tablet Take 5 mg by mouth Every Night.     • metaxalone (SKELAXIN) 800 MG tablet Take 1 tablet by mouth 2 (Two) Times a Day As Needed for muscle spasms. (Patient taking differently: Take 800 mg by mouth 3 (Three) Times a Day As Needed for Muscle Spasms.) 60 tablet 0   • metoprolol tartrate (LOPRESSOR) 25 MG tablet TAKE 1 TABLET BY MOUTH TWICE A DAY  3   • montelukast (SINGULAIR) 10 MG tablet Take 10 mg by mouth Every Night.     • naloxone (NARCAN) 4 MG/0.1ML nasal spray Narcan 4 mg/actuation nasal spray   Use as directed PRN Opioid overdose     • omeprazole (priLOSEC) 20 MG capsule Take 20 mg by mouth 2 (Two) Times a Day.     • ondansetron (ZOFRAN) 4 MG tablet Take 4 mg by mouth Daily With Lunch.     • Pembrolizumab (KEYTRUDA IV) Infuse 1 dose into a venous catheter. EVERY 21 DAYS FOR LUNG CANCER     • rosuvastatin (CRESTOR) 20 MG tablet Take 20 mg by mouth Daily.     •  traMADol (ULTRAM) 50 MG tablet Take 1 tablet by mouth 3 (Three) Times a Day As Needed for moderate pain (4-6). (Patient taking differently: Take 50 mg by mouth 3 (Three) Times a Day As Needed for Moderate Pain. Dr Dsouza  [place]) 90 tablet 0   • diphenhydrAMINE (BENADRYL) 25 mg capsule Take 25 mg by mouth At Night As Needed for Sleep.     • diphenhydrAMINE (SOMINEX) 25 MG tablet Take 1 hr before CT     • HYDROcodone-acetaminophen (NORCO) 5-325 MG per tablet Take 1 tablet by mouth Every 4 (Four) Hours As Needed for Moderate Pain  or Severe Pain  (Pain) for up to 8 doses. 8 tablet 0   • LORazepam (ATIVAN) 0.5 MG tablet lorazepam 0.5 mg tablet   TAKE 1 TABLET BY MOUTH EVERY 8 HOURS AS NEEDED FOR ANXIETY FOR UP TO 30 DAYS.     • multivitamin with minerals tablet tablet Take 1 tablet by mouth Daily.     • nitroglycerin (NITROSTAT) 0.4 MG SL tablet PLACE 1 TAB UNDER TONGUE EVERY 5 MIN FOR UP TO 3 DOSES FOR CHEST PAIN **SEEK ER IF NO RELIEF**  5   • predniSONE (DELTASONE) 50 MG tablet TAKE 1 TABLET 24 HOURS BEFORE CT, 1 TABLET 12 HOURS BEFORE CT, AND 1 TABLET 1 HOUR BEFORE CT       No current facility-administered medications for this visit.     The following portions of the patient's history were reviewed and updated as appropriate: allergies, current medications, past family history, past medical history, past social history, past surgical history and problem list.    REVIEW OF SYSTEMS  Review of Systems   Constitutional: Positive for fatigue.   HENT:   Positive for voice change.    Eyes: Negative.    Respiratory: Negative.    Cardiovascular: Negative.    Gastrointestinal: Negative.    Endocrine: Negative.    Genitourinary: Negative.     Musculoskeletal: Positive for arthralgias, back pain and myalgias.   Skin: Negative.    Neurological: Negative.    Hematological: Negative.    Psychiatric/Behavioral: Negative.        I have reviewed and confirmed the accuracy of the ROS as documented by the MA/REYNALDO/ALEX Williamson  "MD Renaldo    PHYSICAL EXAM  VITAL SIGNS:   Vitals:    09/21/22 1035   BP: 120/77   Pulse: 66   Resp: 18   SpO2: 98%   Weight: 63.5 kg (140 lb)   Height: 170.2 cm (67\")   PainSc:   8   PainLoc: Back  Comment: Chronic/ Tramadol      Physical Exam  Constitutional:       Appearance: Normal appearance. He is normal weight.   HENT:      Head: Normocephalic and atraumatic.      Nose: Nose normal.      Mouth/Throat:      Mouth: Mucous membranes are moist.      Pharynx: Oropharynx is clear.   Eyes:      Extraocular Movements: Extraocular movements intact.      Conjunctiva/sclera: Conjunctivae normal.      Pupils: Pupils are equal, round, and reactive to light.   Cardiovascular:      Rate and Rhythm: Normal rate and regular rhythm.      Pulses: Normal pulses.   Pulmonary:      Effort: Pulmonary effort is normal.      Breath sounds: Normal breath sounds.   Abdominal:      General: Abdomen is flat. Bowel sounds are normal.      Palpations: Abdomen is soft.   Musculoskeletal:         General: Normal range of motion.      Cervical back: Normal range of motion and neck supple.   Skin:     General: Skin is warm.   Neurological:      General: No focal deficit present.      Mental Status: He is alert and oriented to person, place, and time. Mental status is at baseline.   Psychiatric:         Mood and Affect: Mood normal.         Behavior: Behavior normal.         Thought Content: Thought content normal.           Performance Status: ECOG (1) Restricted in physically strenuous activity, ambulatory and able to do work of light nature    Clinical Quality Measures  -Pain Documented by Standardized Tool, FPS Chucky Jamison reports a pain score of 8. Given his pain assessment as noted, treatment options were discussed and the following options were decided upon as a follow-up plan to address the patient's pain: continuation of current treatment plan for pain and use of non-medical modalities (ice, heat, stretching and/or behavior " modifications).  Pain Medications             Aspirin 81 MG capsule aspirin    gabapentin (NEURONTIN) 300 MG capsule Take 300 mg by mouth 2 (Two) Times a Day.    ibuprofen (ADVIL,MOTRIN) 200 MG tablet Take 200 mg by mouth 5 (Five) Times a Day. 2 at lunch 3 at bedtime    metaxalone (SKELAXIN) 800 MG tablet Take 1 tablet by mouth 2 (Two) Times a Day As Needed for muscle spasms.    traMADol (ULTRAM) 50 MG tablet Take 1 tablet by mouth 3 (Three) Times a Day As Needed for moderate pain (4-6).    HYDROcodone-acetaminophen (NORCO) 5-325 MG per tablet Take 1 tablet by mouth Every 4 (Four) Hours As Needed for Moderate Pain  or Severe Pain  (Pain) for up to 8 doses.    predniSONE (DELTASONE) 50 MG tablet TAKE 1 TABLET 24 HOURS BEFORE CT, 1 TABLET 12 HOURS BEFORE CT, AND 1 TABLET 1 HOUR BEFORE CT        -Advanced Care Planning   Advance Care Planning   ACP discussion was held with the patient during this visit. Patient does not have an advance directive, information provided.     -Body Mass Index Screening and Follow-Up Plan BMI is within normal parameters. No other follow-up for BMI required.    -Tobacco Use: Screening and Cessation Intervention Social History    Tobacco Use      Smoking status: Former Smoker        Packs/day: 0.50        Types: Cigarettes        Quit date: 2022        Years since quittin.6      Smokeless tobacco: Never Used      Tobacco comment: quit 2-3 months ago    ASSESSMENT AND PLAN  1. Malignant neoplasm of upper lobe of left lung (HCC)    2. Former smoker      Orders Placed This Encounter   Procedures   • Ambulatory Referral to ONC Social Work   • Ambulatory Referral to Radiation Oncology-External     RECOMMENDATIONS: Chucky Jamison was diagnosed with stage IV left lung squamous cell carcinoma cancer with biopsy positive left hilar mass/right thyroid area mass/posterior neck soft tissues mass in 2021.  Status post carbo/paclitaxel/Keytruda first-line treatment and now continues on  maintenance Keytruda under the care of Dr. Trujillo.  Mixed treatment response has been observed with recent progression of left hilar/infrahilar mass lesion with associated atelectasis and patient has been referred for consideration of palliative thoracic radiotherapy in this area of local disease progression.    The indications and rationale of palliative lung radiation therapy according to the NCCN Guidelines has been discussed today with patient and his wife. I have extensively reviewed the risks, benefits and alternatives of therapy with this diagnosis. The risks of radiation therapy includes but is not limited to radiation induced pulmonary fibrosis, progression of disease in spite of therapy with either local or systemic failure. I have seen, examined and reviewed this patient's medication list, appropriate labs and imaging studies as well as other physician notes. We discussed the goals and plans of care with the patient and family and answered all questions.     Following this discussion and in consideration of the diagnostic data/evaluation of the patient, I did recommend a course of palliative thoracic radiotherapy in the order of 3000 cGy in 10 daily fractions to 3750 cGy in daily 15 fractions (Monday through Friday).    Patient did want to proceed with palliative radiotherapy however he wishes to pursue this closer to home where he has previously received pelvic radiotherapy for his prostate cancer.  This of course is reasonable in terms of significant convenience to the patient.  We will work on referring this patient back to Dr. Arnoldo Donald for consideration of palliative thoracic radiotherapy.      Continue ongoing management per primary care physician and other specialists.  Patient can return to the center on an as-needed basis.  Thank you for allowing me to assist in this patients care.     There are no Patient Instructions on file for this visit.    Time Spent: I spent 54 minutes caring for  Chucky on this date of service. This time includes time spent by me in the following activities: preparing for the visit, reviewing tests, obtaining and/or reviewing a separately obtained history, performing a medically appropriate examination and/or evaluation, counseling and educating the patient/family/caregiver and documenting information in the medical record.   Clay Macario MD  09/21/2022

## 2022-09-21 ENCOUNTER — TELEPHONE (OUTPATIENT)
Dept: CARDIOLOGY CLINIC | Age: 77
End: 2022-09-21

## 2022-09-21 NOTE — PROGRESS NOTES
ASTRID met with Mr. Jamison who is here for a radiation consultation of malignant neoplasm of upper lobe of left lung. SW introduced self and explained role and source of support. He is 77 years old and lives with his spouse. His support system includes his spouse, two daughters, friends, and granddaughter. Mr. Jamison states he has been feeling overwhelmed with his treatments and feels like he keeps receiving “bad news.” SW provided emotional support throughout, utilizing empathy and validating and normalizing identified emotions. SW did speak to his regarding positive coping strategies including a gratitude journal, and provided him with resources on how to decrease anxiety and depression. He does have Ativan but states he does not take it. SW asked about transportation concerns and he states they have friends who are willing to drive them if needed. Mr. Jamison does not have any financial concerns, at this time. He is independent with his personal ADLs. He is still adjusting to the loss of his physical abilities and not being able to do what he was capable in the past. He has trouble sleeping due to his back pain but states he has an appointment on Monday with his workman comp nurse provider and this will be addressed at that appointment. ASTRID will follow up with Mr. Jamison once he starts treatment.

## 2022-09-22 ENCOUNTER — OFFICE VISIT (OUTPATIENT)
Dept: CARDIOLOGY CLINIC | Age: 77
End: 2022-09-22
Payer: MEDICARE

## 2022-09-22 ENCOUNTER — TELEPHONE (OUTPATIENT)
Dept: CARDIOLOGY CLINIC | Age: 77
End: 2022-09-22

## 2022-09-22 VITALS
BODY MASS INDEX: 21.82 KG/M2 | SYSTOLIC BLOOD PRESSURE: 112 MMHG | HEART RATE: 71 BPM | WEIGHT: 139 LBS | HEIGHT: 67 IN | DIASTOLIC BLOOD PRESSURE: 78 MMHG

## 2022-09-22 DIAGNOSIS — I10 ESSENTIAL HYPERTENSION: ICD-10-CM

## 2022-09-22 DIAGNOSIS — R06.09 DYSPNEA ON EFFORT: ICD-10-CM

## 2022-09-22 DIAGNOSIS — I25.10 CORONARY ARTERY DISEASE INVOLVING NATIVE CORONARY ARTERY OF NATIVE HEART WITHOUT ANGINA PECTORIS: Primary | ICD-10-CM

## 2022-09-22 DIAGNOSIS — E78.5 HYPERLIPIDEMIA, UNSPECIFIED HYPERLIPIDEMIA TYPE: ICD-10-CM

## 2022-09-22 DIAGNOSIS — R07.9 CHEST PAIN IN ADULT: ICD-10-CM

## 2022-09-22 DIAGNOSIS — Z95.5 H/O HEART ARTERY STENT: ICD-10-CM

## 2022-09-22 LAB — TROPONIN: <0.01 NG/ML (ref 0–0.03)

## 2022-09-22 PROCEDURE — 1123F ACP DISCUSS/DSCN MKR DOCD: CPT | Performed by: INTERNAL MEDICINE

## 2022-09-22 PROCEDURE — 99214 OFFICE O/P EST MOD 30 MIN: CPT | Performed by: INTERNAL MEDICINE

## 2022-09-22 PROCEDURE — 93000 ELECTROCARDIOGRAM COMPLETE: CPT | Performed by: INTERNAL MEDICINE

## 2022-09-22 RX ORDER — CALCIUM CARBONATE 500(1250)
500 TABLET ORAL DAILY
COMMUNITY

## 2022-09-22 RX ORDER — IBUPROFEN 200 MG
200 TABLET ORAL EVERY 6 HOURS PRN
COMMUNITY

## 2022-09-22 ASSESSMENT — ENCOUNTER SYMPTOMS
RESPIRATORY NEGATIVE: 1
VOMITING: 0
NAUSEA: 0
GASTROINTESTINAL NEGATIVE: 1
SHORTNESS OF BREATH: 0
EYES NEGATIVE: 1
DIARRHEA: 0

## 2022-09-22 NOTE — PROGRESS NOTES
[facility] Progress Note                            Date:  9/22/2022  Patient: Mali Ferrara  Age:  78 y.o., 1945      Reason for evaluation:         SUBJECTIVE:    Returns today for follow-up assessment. He complains of pressure in his chest left side which has been present for a week not worse with activities. ECG sinus rhythm no ischemic changes. This is different than the symptoms that he had prior to his stent. He relates that the tumor behind his left chest and heart is reportedly growing he has a consultation to go see about radiation therapy. No other complaints or issues reported. Pressure 112/78 heart 71. Review of Systems   Constitutional: Negative. Negative for chills, fever and unexpected weight change. HENT: Negative. Eyes: Negative. Respiratory: Negative. Negative for shortness of breath. Cardiovascular: Negative. Negative for chest pain. Gastrointestinal: Negative. Negative for diarrhea, nausea and vomiting. Endocrine: Negative. Genitourinary: Negative. Musculoskeletal: Negative. Skin: Negative. Neurological: Negative. All other systems reviewed and are negative. OBJECTIVE:     /78   Pulse 71   Ht 5' 7\" (1.702 m)   Wt 139 lb (63 kg)   BMI 21.77 kg/m²     Labs:   CBC: No results for input(s): WBC, HGB, HCT, PLT in the last 72 hours. BMP:No results for input(s): NA, K, CO2, BUN, CREATININE, LABGLOM, GLUCOSE in the last 72 hours. BNP: No results for input(s): BNP in the last 72 hours. PT/INR: No results for input(s): PROTIME, INR in the last 72 hours. APTT:No results for input(s): APTT in the last 72 hours. CARDIAC ENZYMES:No results for input(s): CKTOTAL, CKMB, CKMBINDEX, TROPONINI in the last 72 hours.   FASTING LIPID PANEL:  Lab Results   Component Value Date/Time    HDL 35 03/29/2022 09:27 AM    LDLCALC 52 03/29/2022 09:27 AM    TRIG 119 03/29/2022 09:27 AM     LIVER PROFILE:No results for input(s): AST, ALT, LABALBU in the last 72 hours. Past Medical History:   Diagnosis Date    CAD (coronary artery disease)     RUFINO to LCX 1/13    Hyperlipidemia     Hypertension     Lung cancer ([facility]     Malignant neoplasm of left lung stage 4 ([facility])     Nicotine dependence, cigarettes, w unsp disorders 12/01/2017    Prostate cancer ([facility]     [facility]ipheral [facility]     Tobacco abuse      Past Surgical History:   Procedure Laterality Date    BRONCHOSCOPY Left 11/5/2021    BRONCHOSCOPY ENDOBRONCHIAL ULTRASOUND performed by Ulises Humphreys MD at [facility]    BRONCHOSCOPY Left 6/17/2022    BRONCHOSCOPY performed by Ulises Humphreys MD at [facility]  02/02/2013    EF 65%, patent stent to LCX, no new occlusive disease    CATARACT REMOVAL WITH IMPLANT Bilateral 2014    CORONARY ANGIOPLASTY  01/28/2013    RUFINO to Marcy Coles [address] VASCULAR STENT  2013    LUNG BIOPSY  11/05/2021    PROSTATE SURGERY      SHOULDER SURGERY Left      Family History   Problem Relation Age of Onset    Hypertension Mother     Stroke Mother     Cancer Brother 61        Lung    Cancer Brother 79        Prostate     Allergies   Allergen Reactions    Zithromax [Azithromycin]      abd pain.      Penicillins Rash     And pain      Shellfish-Derived Products Rash     Current Outpatient Medications   Medication Sig Dispense Refill    calcium carbonate (OSCAL) 500 MG TABS tablet Take 500 mg by mouth daily      Pembrolizumab (KEYTRUDA IV) Infuse intravenously every 21 days      ibuprofen (ADVIL;MOTRIN) 200 MG tablet Take 200 mg by mouth every 6 hours as needed for Pain      diphenhydrAMINE (BENADRYL) 25 MG tablet Take 1 hr before CT 1 tablet 0    melatonin 3 MG TABS tablet Take 3 mg by mouth nightly as needed      megestrol (MEGACE) 40 MG/ML suspension Take 10 mLs by mouth daily 480 mL 5    metoprolol tartrate (LOPRESSOR) 25 MG tablet Take 1 tablet by mouth 2 times daily 180 tablet 3 ondansetron (ZOFRAN) 4 MG tablet Take 1 tablet by mouth daily as needed for Nausea or Vomiting 30 tablet 0    nitroGLYCERIN (NITROSTAT) 0.4 MG SL tablet Place 1 tablet under the tongue every 5 minutes as needed for Chest pain As directed 25 tablet 5    ezetimibe (ZETIA) 10 MG tablet Take 10 mg by mouth daily      montelukast (SINGULAIR) 10 MG tablet Take 10 mg by mouth nightly      Rosuvastatin Calcium 20 MG CPSP Take 20 mg by mouth daily       metaxalone (SKELAXIN) 800 MG tablet Take 800 mg by mouth      traMADol (ULTRAM) 50 MG tablet Take 50 mg by mouth. Allie Avendano omeprazole (PRILOSEC) 20 MG delayed release capsule Take 20 mg by mouth in the morning and at bedtime       Cholecalciferol (VITAMIN D3) 2000 UNITS CAPS Take by mouth      vitamin B-12 (CYANOCOBALAMIN) 1000 MCG tablet Take 1,000 mcg by mouth daily      aspirin 81 MG tablet Take 81 mg by mouth 2 times daily      folic acid (FOLVITE) 1 MG tablet Take 1 mg by mouth daily      Fluticasone furoate-vilanterol (BREO ELLIPTA) 200-25 MCG/INH AEPB inhaler Inhale into the lungs      azelastine HCl 0.15 % SOLN by Nasal route      cilostazol (PLETAL) 100 MG tablet Take 100 mg by mouth 2 times daily      gabapentin (NEURONTIN) 100 MG capsule Take 1 capsule by mouth 2 times daily for 30 days. (Patient taking differently: Take 300 mg by mouth 2 times daily.) 60 capsule 0     No current facility-administered medications for this visit.      Social History     Socioeconomic History    Marital status:      Spouse name: Not on file    Number of children: Not on file    Years of education: Not on file    Highest education level: Not on file   Occupational History    Not on file   Tobacco Use    Smoking status: Former     Packs/day: 0.25     Years: 60.00     Pack years: 15.00     Types: Cigarettes     Quit date: 2022     Years since quittin.5    Smokeless tobacco: Never   Vaping Use    Vaping Use: Never used   Substance and Sexual Activity    Alcohol use: No    Drug use: No    Sexual activity: Never   Other Topics Concern    Not on file   Social History Narrative    Not on file     Social Determinants of Health     Financial Resource Strain: Not on file   Food Insecurity: Not on file   Transportation Needs: Not on file   Physical Activity: Not on file   Stress: Not on file   Social Connections: Not on file   Intimate Partner Violence: Not on file   Housing Stability: Not on file       Physical Examination:  /78   Pulse 71   Ht 5' 7\" (1.702 m)   Wt 139 lb (63 kg)   BMI 21.77 kg/m²   Physical Exam  Vitals reviewed. Constitutional:       Appearance: He is well-developed. Neck:      Vascular: No carotid bruit or JVD. Cardiovascular:      Rate and Rhythm: Normal rate and regular rhythm. Heart sounds: Normal heart sounds. No murmur heard. No friction rub. No gallop. Pulmonary:      Effort: Pulmonary effort is normal. No respiratory distress. Breath sounds: Normal breath sounds. No wheezing or rales. Abdominal:      General: There is no distension. Tenderness: There is no abdominal tenderness. Lymphadenopathy:      Cervical: No cervical adenopathy. Skin:     General: Skin is warm and dry. ASSESSMENT:     Diagnosis Orders   1. Coronary artery disease involving native coronary artery of native heart without angina pectoris  EKG 12 lead      2. Chest pain in adult  Troponin      3. H/O heart artery stent        4. Dyspnea on effort        5. Hyperlipidemia, unspecified hyperlipidemia type        6. Essential hypertension            PLAN:  Orders Placed This Encounter   Procedures    Troponin    EKG 12 lead     No orders of the defined types were placed in this encounter.         Continue present medications  We will check a troponin just to make sure that something unexpected has not happened but it sounds like this is noncardiac based  Recommend follow-up assessment otherwise back in 6 months    Return in about 6 months (around 3/22/2023) for return to Dr. Roney Perera only. Seth Valdez MD 9/22/2022 10:45 AM CDT    [id number] [facility]      Thisdictation was generated by voice recognition computer software. Although all attempts are made to edit the dictation for accuracy, there may be errors in the transcription that are not intended.

## 2022-09-24 NOTE — TELEPHONE ENCOUNTER
[facility] received a fax that requires your attention. The document has been indexed to the patient’s chart for your review.      Reason for sending: RECEIVED PLAN OF CARE FROM [facility] - NEEDS PROVIDER SIGNATURE AND FAXED BACK TO SENDER    Documents Description: PLAN OF CARE_JOSHUA ENNIS CCC-SLP_9/13/2022    Name of Sender: [facility]    Date Indexed: 9/24/2022

## 2022-10-07 NOTE — PROGRESS NOTES
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 He is status post induction chemoimmunotherapy. Edie Caballero returns to the clinic, accompanied by his wife, to discuss imaging studies to assess treatment response. He is also scheduled for maintenance pembrolizumab. He has been tolerated treatment without any major side effects other than fatigue. He gets occasionally confused. Denies any skin related side effects. He had complaints of chest pain last week and was taken to local ER at [facility] in [facility]. A CTA was performed. No evidence of pulmonary emboli. Diagnosis  Squamous cell carcinoma, Left lung, Nov 2021  [id number], stage IV (left thyroid nodule, skin metastasis)  11/5/21-PD-L1 Expressed 35%     Treatment Summary  12/20/21- Initiated Carboplatin AUC 5 Paclitaxel 175mg/m2 and Keytruda    3/14/22 Initiated maintenance Pembrolizumab 200mg every 3 weeks  9/21/22 Initiated palliative radiation therapy of 3000 cGy over 10 treatments, increasing 3750 cGy over 15 treatments     Cancer History  Kuldeep Chavis was first seen by me on 11/15/2021. He was referred by pulmonary at [facility] for a diagnosis of lung cancer. He developed complaints of cough. Chest x-ray was performed and showed a lung mass. Patient is a currently smoker. He lost about 25 pounds. 9/28/21 CXR [facility]): Questionable mass density seen only on the lateral view with patchy density in left upper lobe. Further evaluation with computed tomography is recommended.  COPD.   10/4/21 CT chest [facility]): Mass lesion left hilum extending caudally along the mediastinum and inseparable from the mediastinum in this region. This is highly suspicious for malignancy. Consolidation and/or neoplastic mass left upper lobe as described above Emphysema. New right thyroid nodule, probably solid. 2 chronic cysts within the liver. 10/22/21- PET scan [facility]): Essentially, mass in the right side of the neck of the neck base measuring 2.8 cm x 2.4 cm. SUV 18.9. Also small focus soft tissue of the back at the base of the neck on the right. Lesion measured 9 mm. SUV 10.5. Large mass in the left hilum measures 5.1 x 4.2 cm (SUV 18.6). Extensive parenchymal abnormality with a high SUV uptake in the left upper lobe. Apical component with a small amount of cavitation measuring 2.9 x 2.6 cm with SUV 11.3.  11/5/21 Lung, left mainstem bronchus biopsy with touch imprint smears: Invasive keratinizing squamous cell carcinoma. Lung, fine-needle aspiration of left hilar mass, ThinPrep and cell block: Keratinizing squamous cell carcinoma. Lung, left mainstem bronchial washing, ThinPrep and cell block: Keratinizing squamous cell carcinoma. 11/15/2021-he was first seen by me. Recommended biopsy of right neck mass and also subcutaneous nodule right upper back. Discussed with ENT, Dr. Amelia Goddard. 11/17/2021 Final Diagnosis: Excisional biopsy of a metastatic nodul in the thyroid gland as well as metastatic skin nodule. Right posterior lower neck\": Metastatic keratinizing squamous cell carcinoma involving fibroadipose tissue. 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 11/24/2021 MRI Brain W Wo Contrast No acute intracranial abnormality, mass or acute infarction. Chronic ischemic and atrophic changes. 12/6/2021-essentially, stage IV disease. Squamous cell carcinoma PDL 35% metastatic left upper back skin nodule consistent with squamous cell carcinoma. Left thyroid mass consistent with metastatic, cell carcinoma.   Recommended frontline chemotherapy with carboplatin AUC 5, paclitaxel 175 mg/m², Keytruda every 3 weeks x4 cycles to be followed by maintenance Keytruda if stable disease. 12/9/2021 2D Echo Normal left ventricular size and systolic function EF 55 to 60%. 12/20/2021-initiation of palliative chemotherapy with carboplatin/Taxol/Keytruda. 2/18/22 CT CHEST W CONTRAST A mid to lower left hilar mass is smaller in size. Specifically,a central mass in the lower left hilum now measures 5 x 3.2 cm and previously measured 6 x 4.5 cm. Somewhat curvilinear opacities in both upper lobes, worsening on the left and developing on the right when compared to the prior study. There is an increasing area of similar opacity in the left upper lobe located slightly more superiorly now measuring 2.2 x 1.4 cm and previously measuring about 1.2 x 1.5 cm. There is another new area that has developed more inferiorly in the left upper lobe measuring 1.6 x 1 cm. There are similar appearing new developing  smewhat linear opacities in the right upper lobeGiven the curvilinear appearance and fine calcifications, progressive massive fibrosis is the leading differential. Infectious etiology cannot be ruled out, including atypical infections, such as TB. Pulmonary consultation recommended for this finding. Neoplastic disease is felt to be less likely given the appearance and bilateral upper lobe involvement. There is a stable subpleural right upper lobe nodule measuring 4-5 mm. Severe centrilobular emphysema. Atheromatous disease of the thoracic aorta and coronary arteries. No mediastinal lymphadenopathy is seen. Small densities in the trachea on the right and posteriorly are likely retained secretions. Interim right thyroidectomy. 2/21/2022-essentially, interval response to treatment. Continue carboplatin/Taxol and Keytruda  3/14/22 Initiated maintenance Pembrolizumab 200mg every 3 weeks  3/14/2022 FL Esophagram Complete ([facility])  No esophageal mass or stricture. Prominent persistent contraction of the cricopharyngeus muscle with indentation of the posterior wall of the upper esophagus. Esophageal dysmotility with delayed clearance. 4/13/2022 CT Chest W Contrast New ill-defined 1.6 cm spiculated pleural-based opacity in the left lower lobe medially image 94 series 4. This could be neoplastic. Infection and inflammation are also considered. Central lower left hilar area of mass effect is smaller on the current study. Stable small right upper lobe pulmonary nodule. Worsening consolidation in the right upper lobe. This is likely infectious or inflammatory. Curvilinear opacities in the left upper lobe appear relatively stable and may also be infectious or inflammatory. Please see the neck, abdomen and pelvis CT report separately. 4/13/2022 CT Abd/Pelvis W IV Contrast (Oral) New ill-defined 1.6 cm spiculated pleural-based opacity in the left lower lobe medially image 94 series 4. This could be neoplastic. Infection and inflammation are also considered. Central lower left hilar area of mass effect is smaller on the current study. Stable small right upper lobe pulmonary nodule. Worsening consolidation in the right upper lobe. This is likely infectious or inflammatory. Curvilinear opacities in the left upper lobe appear relatively stable and may also be infectious or inflammatory. Please see the neck, abdomen and pelvis CT report separately. 4/13/2022 CT Soft Tissue Neck W Contrast No pathologically enlarged cervical chain or posterior triangle lymphadenopathy is present. There is asymmetry at the level of the right vocal fold with asymmetric soft tissue density and adduction of the right vocal fold. Correlation is recommended with the patient's history. Those portions of the brain which are visualized are normal in appearance. Please see previously dictated CT of the chest report for findings within both upper lobes. The right lobe of the thyroid gland is not visualized and I suspect is surgically absent. 5/25/2022-essentially, findings of mixed response in the chest.  Continue Keytruda. Repeat CT chest in 2 months. 6/15/2022 CT Chest W Contrast  COPD. Central lobular emphysema with multiple bullae formation at the apices. Multiple ill-defined Spiculated masses, the largest of which measures 7.0 x 3.3 cm in the right super hilar region, indeterminate in nature. Differential consideration would include multifocal neoplasm versus interstitial scarring/fibrosis. Please correlate clinically. Bronchiectatic changes in the lower lobes bilaterally with suspected consolidation of the left lower lobe. Please correlate clinically 1.0 cm calcified right subcarinal node. 6/15/2022 CT Abd/Pelvis W IV Contrast (Oral) 3.3 x 1.7 cm fat containing left inguinal hernia. 3.7 x 3.3 cm mild aneurysmal dilatation of the distal abdominal aorta with circumferential mural thrombus. The appendix is not identified. Multiple simple hepatic cysts. Bilateral simple renal cysts. Recommendation: Follow up as clinically indicated. 6/17/2022-he was seen by pulmonary referred for new finding the left lower lobe. He underwent a bronchoscopy and biopsy Lesion, left lower lobe bronchus, biopsies: No malignancy identified. Benign bronchial mucosa. 6/17/2022 Lung, left lower lobe bronchoalveolar lavage: Blood admixed with very rare degenerated benign appearing epithelial cells. 6/27/2022- I reviewed results CT chest abdomen pelvis. The findings in the CT chest seems to be stable. Unfortunately, the radiologist did not specify his impression whether this is better worse or stable. I did my best to reach out to radiology on 6/27/2022 and I am waiting a callback. Continue Keytruda for now.  6/29/2022 NM Bone Scan A tiny focus of upatke is in the soft tissues right mid thigh region. Foci of increased tracer accumulation are in the left 3rd, 4th and 5thleft ribs.  These areas correspond to sclerotic foci is seen in the ribcage on CT chest dated 6/15/2022. The pattern of uptake abd sclerosis suggest remote posttraumatic changes with metastatic disease being less likely. 8/8/2022-patient tolerating treatment with Dionisio Owusu well.   8/23/2022 CT Chest W Contrast Interval improvement in the right lung. Interval worsening and enlarging masses in nodules within the left lung. Small pericardial effusion. Stable liver cyst.  Appearance for cholelithiasis and possible choledocholithiasis. Similar appearance detected on prior CT abdomen and pelvis dated 06/15/2022. If not previously performed, correlate with right upper quadrant ultrasound and noncontrast MRI abdomen/MRCP as clinically indicated. 8/23/2022 CT Abd/Pelvis W IV Contrast (oral) Spiculated irregular mass in lateral basal segment of left lower lobe, likely known squamous cell carcinoma of left lung. Additional abnormal soft tissue within the posterior mediastinum consistent with pathologic adenopathy. Mild-moderate pericardial fluid. Mild aneurysmal dilatation of infrarenal abdominal aorta with circumferential eccentric thrombus. Other nonacute findings above. Multiple tiny calcified splenic granulomas. As compared to previous CT scan dated 15th June 2022, there are new findings in the form of mild to moderate pericardial effusion and irregular spiculated mass involving lateral basal segment of left lower lobe. Rest of the findings remains unchanged  8/23/2022-CT CAP Mixed findings. Improvement on the right and improvement in most left lung nodules.   The left infrahilar soft tissue mass enlarged with obstructive atelectasis, Refer to Dr. Wu Host   9/21/22 Initiated palliative radiation therapy of 3000 cGy over 10 treatments, increasing 3750 cGy over 15 treatments  10/5/2022 Chest Angiogram with Contrast ([facility]) Interval enlargement of large, invasive left hilar soft tissue mass with extension into the posterior mediastinum and involvement of the left pulmonary arterial and venous branches. Slight interval decrease and consolidative opacities within the left upper lobe. New areas of ill-defined opacities within the right upper lobe, some of which contain internal calcification. Severe centrilobular emphysema. There is been interval enlargement of the left hilar soft tissue mass which extends into the posterior mediastinum and involves the left pulmonary veins and adjacent left pulmonary arterial branches. This mass extends into the left lower lobe. This measures a maximum of 7.7 x 7.0 cm on axial imaging, previously measuring 5.1 x 4.2 cm on the reference PET/CT from October 2021.   10/10/2022-pembrolizumab will be on hold until comparison is made to prior CT scan. The patient was instructed to drop off CT disc from prior CT scan at [email or web address] in [facility] for proper comparison.          PAST MEDICAL HISTORY:   Past Medical History:   Diagnosis Date    CAD (coronary artery disease)     RUFINO to LCX 1/13    Hyperlipidemia     Hypertension     Lung cancer ([facility] [address]     Malignant neoplasm of left lung stage 4 (HCC)     Nicotine dependence, cigarettes, w unsp disorders 12/01/2017    Prostate cancer ([facility] [address]     PVD (peripheral vascular disease) ([facility] [address]     Tobacco abuse           PAST SURGICAL HISTORY:  Past Surgical History:   Procedure Laterality Date    BRONCHOSCOPY Left 11/5/2021    BRONCHOSCOPY ENDOBRONCHIAL ULTRASOUND performed by Roger Butcher MD at [facility]    BRONCHOSCOPY Left 6/17/2022    BRONCHOSCOPY performed by Roger Butcher MD at [facility]  02/02/2013    EF 65%, patent stent to LCX, no new occlusive disease    CATARACT REMOVAL WITH IMPLANT Bilateral 2014    CORONARY ANGIOPLASTY  01/28/2013    RUFINO to [address] VASCULAR STENT  2013    LUNG BIOPSY  11/05/2021    PROSTATE SURGERY      SHOULDER SURGERY Left         SOCIAL HISTORY:  Social History     Socioeconomic History    Marital status:      Spouse name: None    Number of children: None    Years of education: None    Highest education level: None   Tobacco Use    Smoking status: Former     Packs/day: 0.25     Years: 60.00     Pack years: 15.00     Types: Cigarettes     Quit date: 2022     Years since quittin.6    Smokeless tobacco: Never   Vaping Use    Vaping Use: Never used   Substance and Sexual Activity    Alcohol use: No    Drug use: No    Sexual activity: Never       FAMILY HISTORY:  Family History   Problem Relation Age of Onset    Hypertension Mother     Stroke Mother     Cancer Brother 61        Lung    Cancer Brother 79        Prostate        Current Outpatient Medications   Medication Sig Dispense Refill    calcium carbonate (OSCAL) 500 MG TABS tablet Take 500 mg by mouth daily      Pembrolizumab (KEYTRUDA IV) Infuse intravenously every 21 days      ibuprofen (ADVIL;MOTRIN) 200 MG tablet Take 200 mg by mouth every 6 hours as needed for Pain      diphenhydrAMINE (BENADRYL) 25 MG tablet Take 1 hr before CT 1 tablet 0    melatonin 3 MG TABS tablet Take 3 mg by mouth nightly as needed      megestrol (MEGACE) 40 MG/ML suspension Take 10 mLs by mouth daily 480 mL 5    metoprolol tartrate (LOPRESSOR) 25 MG tablet Take 1 tablet by mouth 2 times daily 180 tablet 3    gabapentin (NEURONTIN) 100 MG capsule Take 1 capsule by mouth 2 times daily for 30 days.  (Patient taking differently: Take 300 mg by mouth 2 times daily.) 60 capsule 0    ondansetron (ZOFRAN) 4 MG tablet Take 1 tablet by mouth daily as needed for Nausea or Vomiting 30 tablet 0    nitroGLYCERIN (NITROSTAT) 0.4 MG SL tablet Place 1 tablet under the tongue every 5 minutes as needed for Chest pain As directed 25 tablet 5    ezetimibe (ZETIA) 10 MG tablet Take 10 mg by mouth daily      montelukast (SINGULAIR) 10 MG tablet Take 10 mg by mouth nightly      Rosuvastatin Calcium 20 MG CPSP Take 20 mg by mouth daily       metaxalone (SKELAXIN) 800 MG tablet Take 800 mg by mouth      traMADol (ULTRAM) 50 MG tablet Take 50 mg by mouth. Adela Cuba omeprazole (PRILOSEC) 20 MG delayed release capsule Take 20 mg by mouth in the morning and at bedtime       Cholecalciferol (VITAMIN D3) 2000 UNITS CAPS Take by mouth      vitamin B-12 (CYANOCOBALAMIN) 1000 MCG tablet Take 1,000 mcg by mouth daily      aspirin 81 MG tablet Take 81 mg by mouth 2 times daily      folic acid (FOLVITE) 1 MG tablet Take 1 mg by mouth daily      Fluticasone furoate-vilanterol (BREO ELLIPTA) 200-25 MCG/INH AEPB inhaler Inhale into the lungs      azelastine HCl 0.15 % SOLN by Nasal route      cilostazol (PLETAL) 100 MG tablet Take 100 mg by mouth 2 times daily       No current facility-administered medications for this visit. REVIEW OF SYSTEMS:    Constitutional: no fever, no night sweats,  fatigue;   HEENT: no blurring of vision, no double vision, no hearing difficulty, no tinnitus,no ulceration, no dysphagia  Lungs: no cough, no shortness of breath, no wheeze;   CVS: no palpitation, no chest pain, no shortness of breath;  GI: no abdominal pain, no nausea , no vomiting, no constipation;   LLUVIA: no dysuria, frequency and urgency, no hematuria, no kidney stones;   Musculoskeletal: no joint pain, swelling , stiffness;   Endocrine: no polyuria, polydypsia, no cold or heat intolerence; Hematology/lymphatic: no easy brusing or bleeding, no hx of clotting disorder; no peripheral adenopathy. Dermatology: no skin rash, no eczema, no pruritis;   Psychiatry: no depression, no anxiety,no panic attacks, no suicide ideation;    Neurology: no syncope, no seizures, no numbness or tingling of hands, no numbness or tingling of feet, no paresis;     PHYSICAL EXAM:    Vitals signs:  /71   Pulse 93   Temp 97.8 °F (36.6 °C)   Resp 18   Ht 5' 7\" (1.702 m)   Wt 141 lb 12.8 oz (64.3 kg)   SpO2 95%   BMI 22.21 kg/m²    Pain scale:  Pain Score:   0 - No pain   Wt Readings from Last 3 Encounters:   10/10/22 141 lb 12.8 oz (64.3 kg)   09/22/22 139 lb (63 kg) 09/19/22 142 lb 3.2 oz (64.5 kg)       CONSTITUTIONAL: Alert, appropriate, no acute distress,   EYES: Non icteric, EOM intact, pupils equal round and reactive to light and accommodation. ENT: Oral mucus membranes moist, no oral pharyngeal lesions. External inspection of ears and nose are normal.   NECK: Supple, no masses. No palpable thyroid mass    CHEST/LUNGS: CTA bilaterally, normal respiratory effort   CARDIOVASCULAR: RRR, no murmurs. No lower extremity edema   ABDOMEN: soft non-tender, active bowel sounds, no hepatosplenomegaly. No palpable masses. EXTREMITIES: warm, Full ROM of all fours extremities. No focal weakness. SKIN: warm, dry with no rashes or lesions  LYMPH: No cervical, clavicular, axillary, or inguinal lymphadenopathy  NEUROLOGIC: follows commands, non focal.   PSYCH: mood and affect appropriate. Alert and oriented to time and place and person. Relevant Lab findings/reviewed by me:  Lab Results   Component Value Date    WBC 8.63 10/10/2022    HGB 11.4 (L) 10/10/2022    HCT 35.5 (L) 10/10/2022    MCV 92.9 (H) 10/10/2022     10/10/2022     Lab Results   Component Value Date    NEUTROABS 6.89 (H) 10/10/2022     Lab Results   Component Value Date     10/10/2022    K 3.7 10/10/2022     10/10/2022    CO2 25 10/10/2022    BUN 12 10/10/2022    CREATININE 0.8 10/10/2022    GLUCOSE 98 10/10/2022    CALCIUM 9.3 10/10/2022    PROT 7.0 10/10/2022    LABALBU 3.7 10/10/2022    BILITOT 0.5 10/10/2022    ALKPHOS 109 10/10/2022    AST 22 10/10/2022    ALT 21 10/10/2022    LABGLOM >60 10/10/2022    GFRAA >60 11/24/2021    GLOB 3.3 10/10/2022       Relevant Imaging studies/reviewed by me:  10/5/2022 CTA PE W WO Contrast [facility]) Interval enlargement of large, invasive left hilar soft tissue mass with extension into the posterior mediastinum and involvement of the left pulmonary arterial and venous branches. Slight interval decrease and consolidative opacities within the left upper lobe.  New areas of ill-defined opacities within the right upper lobe, some of which contain internal calcification. Severe centrilobular emphysema. ASSESSMENT    Orders Placed This Encounter   Procedures    CBC with Auto Differential     Standing Status:   Future     Number of Occurrences:   1     Standing Expiration Date:   10/10/2023    Comprehensive Metabolic Panel     Standing Status:   Future     Number of Occurrences:   1     Standing Expiration Date:   10/10/2023    TSH     Standing Status:   Future     Number of Occurrences:   1     Standing Expiration Date:   10/10/2023    Cortisol Total     Standing Status:   Future     Number of Occurrences:   1     Standing Expiration Date:   10/10/2023     Order Specific Question:   8AM or 4PM?     Answer:   8 am      Mohit Hunt was seen today for cancer. Diagnoses and all orders for this visit:    Fatigue due to treatment  -     TSH; Future  -     Cortisol Total; Future    Squamous cell carcinoma of left lung (HCC)  -     CBC with Auto Differential; Future  -     Comprehensive Metabolic Panel; Future    Chemotherapy management, encounter for    Care plan discussed with patient    Antineoplastic chemotherapy induced anemia     Non-small cell lung cancer, SCC [id number] stage IV (left thyroid, skin subcutaneous nodule) PDL 1 35%  Essentially, findings of squamous cell non-small cell lung cancer left lung with skin metastasis/left thyroid metastasis  Reviewed pathology skin nodule/left thyroid mass which were both consistent with metastatic squamous cell carcinoma. Recommended:  Carboplatin AUC 5  Paclitaxel 175 mg/m²  Keytruda 200mg IV   X4 cycles  every 3 weeks  To be followed by maintenance Keytruda 200 mg IV every 3 weeks  Discussed at length about logistics/side effects of treatment. -Repeat CT soft tissue neck, chest, abdomen pelvis on 4/13/2022 showed findings of mixed response in the chest.     Continue maintenance pembrolizumab only.      -Repeat CT chest, abdomen pelvis-it seems to me that he has overall stable disease in the right upper lobe and left lower lobe. Overall interpretation is very difficult due to underlying structural lung disease. However, it seems to me that he has mostly stable disease. He has significant postobstructive change/atelectasis that have improved on the most recent CT chest.  -Bone scan showed no obvious evidence of progression. Plan:  -Continue Keytruda, palliative setting. Given the fact that immunotherapy is the most important drug I do not feel that he has any significant progression or regression. Therefore, we will continue immunotherapy at this time and repeat CT scans in 2 months, around 8/15/2022.     -Repeat CT scans before next visit. Addendum notes 10/12/2022. I reviewed the addendum from the CT angiogram performed at outside facility. Unfortunately, the patient has significant disease progression. His performance status has declined. He was seen by radiation oncology and recommended palliative radiation to the chest mass. I agree with this recommendation. The patient will be seen closer to home. I have discussed with his wife today about consideration of hospice after palliative radiation. We also discussed about other options to include palliative second line chemotherapy. Unfortunately, second line chemotherapy could have significant impact his quality of life due to side effects     Chemotherapy related adverse events-fatigue, myalgia, arthralgia, nausea, insomnia, joint pain. Headaches- resolved. MRI brain was unremarkable.      Cancer related anxiety-Recommend Ativan 0.5mg as needed     Fatigue-        Lab Results   Component Value Date     TSH 4.110 08/08/2022      Insomnia-try melatonin 5 mg and Benadryl 25 mg at bedtime       PLAN:  RTC with MD 10/26/2022  Discontinue chemoimmunotherapy  CBC/CMP/TSH level today   Recommend comparison of last CT scan at [place] [id number] suspension 400mg daily  CT scan pre-meds given  Continue follow-up with [facility] speech therapy   Continue follow-up with Dr. Joseline Wick/[facility] ENT  Continue Zofran 4mg for nausea  Continue  OTC Benadryl and/or Melatonin for sleep  Continue Ativan 0.5mg every 8 hrs as needed-refill sent  Consider hospice after palliative radiation       Follow Up:     No follow-ups on file. Data [place], am pre charting  as Medical Assistant for Ap Ruvalcaba MD. Electronically signed by Rodney Young MA on 10/10/2022 at 3:00 PM CDT. Reagan Mack am scribing for Ap Ruvalcaba MD. Electronically signed by Briseyda Lipscomb RN on 10/10/2022 at 10:23 AM CDT. I, Dr Laura Fabian, personally performed the services described in this documentation as scribed by Briseyda Lipscomb RN in my presence and is both accurate and complete. I have seen, examined and reviewed this patient medication list, appropriate labs and imaging studies. I reviewed relevant medical records and others physicians notes. I discussed the plans of care with the patient. I answered all the questions to the patients satisfaction. I have also reviewed the chief complaint (CC) and part of the history (History of Present Illness (HPI), Past Family Social History [facility]), or Review of Systems (ROS) and made changes when appropriated.        (Please note that portions of this note were completed with a voice recognition program. Efforts were made to edit the dictations but occasionally words are mis-transcribed.)  Electronically signed by Ap Ruvalcaba MD on 10/10/2022 at 10:25 AM

## 2022-10-10 ENCOUNTER — OFFICE VISIT (OUTPATIENT)
Dept: HEMATOLOGY | Age: 77
End: 2022-10-10
Payer: OTHER GOVERNMENT

## 2022-10-10 ENCOUNTER — HOSPITAL ENCOUNTER (OUTPATIENT)
Dept: INFUSION THERAPY | Age: 77
Discharge: HOME | End: 2022-10-10
Payer: OTHER GOVERNMENT

## 2022-10-10 VITALS
TEMPERATURE: 97.8 F | DIASTOLIC BLOOD PRESSURE: 71 MMHG | RESPIRATION RATE: 18 BRPM | SYSTOLIC BLOOD PRESSURE: 114 MMHG | WEIGHT: 141.8 LBS | HEIGHT: 67 IN | BODY MASS INDEX: 22.26 KG/M2 | OXYGEN SATURATION: 95 % | HEART RATE: 93 BPM

## 2022-10-10 DIAGNOSIS — Z51.11 CHEMOTHERAPY MANAGEMENT, ENCOUNTER FOR: ICD-10-CM

## 2022-10-10 DIAGNOSIS — Z71.89 CARE PLAN DISCUSSED WITH PATIENT: ICD-10-CM

## 2022-10-10 DIAGNOSIS — T45.1X5A ANTINEOPLASTIC CHEMOTHERAPY INDUCED ANEMIA: ICD-10-CM

## 2022-10-10 DIAGNOSIS — C34.92 SQUAMOUS CELL CARCINOMA OF LEFT LUNG (HCC): ICD-10-CM

## 2022-10-10 DIAGNOSIS — R53.83 FATIGUE DUE TO TREATMENT: ICD-10-CM

## 2022-10-10 DIAGNOSIS — D64.9 NORMOCYTIC ANEMIA: ICD-10-CM

## 2022-10-10 DIAGNOSIS — D64.81 ANTINEOPLASTIC CHEMOTHERAPY INDUCED ANEMIA: ICD-10-CM

## 2022-10-10 DIAGNOSIS — C34.92 STAGE IV SQUAMOUS CELL CARCINOMA OF LEFT LUNG (HCC): ICD-10-CM

## 2022-10-10 DIAGNOSIS — R53.83 FATIGUE DUE TO TREATMENT: Primary | ICD-10-CM

## 2022-10-10 DIAGNOSIS — Z51.12 ENCOUNTER FOR ANTINEOPLASTIC IMMUNOTHERAPY: ICD-10-CM

## 2022-10-10 LAB
ALBUMIN SERPL-MCNC: 3.7 G/DL (ref 3.5–5.2)
ALP BLD-CCNC: 109 U/L (ref 40–130)
ALT SERPL-CCNC: 21 U/L (ref 21–72)
ANION GAP SERPL CALCULATED.3IONS-SCNC: 12 MMOL/L (ref 7–19)
AST SERPL-CCNC: 22 U/L (ref 17–59)
BILIRUB SERPL-MCNC: 0.5 MG/DL (ref 0.2–1.3)
BUN BLDV-MCNC: 12 MG/DL (ref 9–20)
CALCIUM SERPL-MCNC: 9.3 MG/DL (ref 8.4–10.2)
CHLORIDE BLD-SCNC: 101 MMOL/L (ref 98–111)
CO2: 25 MMOL/L (ref 22–29)
CORTISOL TOTAL: 11.1 UG/DL
CREAT SERPL-MCNC: 0.8 MG/DL (ref 0.6–1.2)
GFR NON-AFRICAN AMERICAN: >60
GLOBULIN: 3.3 G/DL
GLUCOSE BLD-MCNC: 98 MG/DL (ref 74–106)
HCT VFR BLD CALC: 35.5 % (ref 40.1–51)
HEMOGLOBIN: 11.4 G/DL (ref 13.7–17.5)
LYMPHOCYTES ABSOLUTE: 0.99 K/UL (ref 1.18–3.74)
LYMPHOCYTES RELATIVE PERCENT: 11.5 % (ref 19.3–53.1)
MCH RBC QN AUTO: 29.8 PG (ref 25.7–32.2)
MCHC RBC AUTO-ENTMCNC: 32.1 G/DL (ref 32.3–36.5)
MCV RBC AUTO: 92.9 FL (ref 79–92.2)
MONOCYTES ABSOLUTE: 0.57 K/UL (ref 0.24–0.82)
MONOCYTES RELATIVE PERCENT: 6.6 % (ref 4.7–12.5)
NEUTROPHILS ABSOLUTE: 6.89 K/UL (ref 1.56–6.13)
NEUTROPHILS RELATIVE PERCENT: 79.8 % (ref 34–71.1)
PDW BLD-RTO: 15.8 % (ref 11.6–14.4)
PLATELET # BLD: 216 K/UL (ref 163–337)
PMV BLD AUTO: 9.3 FL (ref 7.4–10.4)
POTASSIUM SERPL-SCNC: 3.7 MMOL/L (ref 3.5–5.1)
RBC # BLD: 3.82 M/UL (ref 4.63–6.08)
SODIUM BLD-SCNC: 138 MMOL/L (ref 137–145)
TOTAL PROTEIN: 7 G/DL (ref 6.3–8.2)
TSH SERPL DL<=0.05 MIU/L-ACNC: 5.13 UIU/ML (ref 0.27–4.2)
WBC # BLD: 8.63 K/UL (ref 4.23–9.07)

## 2022-10-10 PROCEDURE — 99212 OFFICE O/P EST SF 10 MIN: CPT

## 2022-10-10 PROCEDURE — 36415 COLL VENOUS BLD VENIPUNCTURE: CPT | Performed by: INTERNAL MEDICINE

## 2022-10-10 PROCEDURE — 36415 COLL VENOUS BLD VENIPUNCTURE: CPT

## 2022-10-10 PROCEDURE — 80053 COMPREHEN METABOLIC PANEL: CPT

## 2022-10-10 PROCEDURE — 1123F ACP DISCUSS/DSCN MKR DOCD: CPT | Performed by: INTERNAL MEDICINE

## 2022-10-10 PROCEDURE — 99213 OFFICE O/P EST LOW 20 MIN: CPT | Performed by: INTERNAL MEDICINE

## 2022-10-10 PROCEDURE — 85025 COMPLETE CBC W/AUTO DIFF WBC: CPT

## 2022-10-10 RX ORDER — LORAZEPAM 0.5 MG/1
0.5 TABLET ORAL EVERY 8 HOURS PRN
Qty: 60 TABLET | Refills: 0 | Status: SHIPPED | OUTPATIENT
Start: 2022-10-10 | End: 2022-11-09

## 2022-10-10 NOTE — PROGRESS NOTES
YOB: 1945  Location: [facility]  Location Address: [address] [place] [address], [facility], [address] [place] [address]  Location Phone: [phone]    Chief Complaint   Patient presents with   • Hoarse   • Squamous Cell Carcinoma   • Difficulty Swallowing       History of Present Illness  Chucky Jamison is a 77 y.o. male.  Chucky Jamison is here for follow up of ENT complaints. The patient is s/p vocal cord injection on 2022. Patient is worse today. He had a session of ST and had to cough hard and he felt like something is in this throat. Patient still has globus sensation, swallowing has become worse and his throat is occasional sore. Patient has history of on-small cell lung cancer, SCC [id number] (right thyroid, skin posterior cervical subcutaneous nodule)   SCCa - left lung with skin metastasis/right thyroid metastasis S/P right thyroidectomy 21 with pathology skin nodule/left thyroid mass consistent with metastatic SCCa. Patient had MicroDirect laryngoscopy with right true vocal cord injection (Prolaryn) on 22.    He was significantly improved after Prolaryn  Injection in .  In the last few weeks he has noticed significant decline with more difficulty eating and coarse breathy voice.         Past Medical History:   Diagnosis Date   • Arthritis    • Cataract    • COPD (chronic obstructive pulmonary disease) (HCC)    • Coronary artery disease    • Disease of thyroid gland     LOWER LOBE REMOVED R/T CANCER   • Elevated cholesterol    • Emphysema lung (HCC)    • GERD (gastroesophageal reflux disease)    • Heart disease    • Hypercholesteremia    • Hypertension    • Lung cancer (HCC)    • Mass of right side of neck    • Mass of soft tissue of neck    • Prostate cancer (HCC)        Past Surgical History:   Procedure Laterality Date   • CARDIAC CATHETERIZATION     • CATARACT EXTRACTION WITH INTRAOCULAR LENS IMPLANT Bilateral    • CORONARY STENT PLACEMENT      2013 X1   • EXCISION MASS HEAD/NECK Right  11/17/2021    Procedure: Excisional biopsy of right posterior neck mass and excisional biopsy of right anterior neck mass (related to the inferior right thyroid lobe);  Surgeon: Blair Walsh MD;  Location:  [facility];  Service: ENT;  Laterality: Right;   • HERNIA REPAIR     • LARYNGOSCOPY WITH LARYNGEAL INJECTION N/A 6/20/2022    Procedure: DIRECT LARYNGOSCOPY WITH VOCAL CORD INJECTION WITH CYMETRA OR PROLARYN;  Surgeon: Blair Walsh MD;  Location:  [facility];  Service: ENT;  Laterality: N/A;   • LUNG BIOPSY     • PROSTATE SURGERY         Outpatient Medications Marked as Taking for the 10/11/22 encounter (Office Visit) with Blair Walsh MD   Medication Sig Dispense Refill   • Aspirin 81 MG capsule aspirin     • azelastine (ASTELIN) 0.1 % nasal spray 1 spray into each nostril.     • BREO ELLIPTA 200-25 MCG/INH aerosol powder  INHALE 1 PUFF DAILY  1   • Calcium Carb-Cholecalciferol (CALCIUM 500 + D PO) Take 1 tablet by mouth Daily. UNABLE TO SWALLOW RIGHT NOW     • Cholecalciferol (VITAMIN D3) 2000 UNITS capsule Take 2 tablets by mouth Daily.     • cilostazol (PLETAL) 100 MG tablet Take 100 mg by mouth 2 (Two) Times a Day. HOLD for DOS-11/17/21  1   • Cyanocobalamin (VITAMIN B12) 1000 MCG tablet controlled-release Take 1,000 mcg by mouth Daily.     • diphenhydrAMINE (BENADRYL) 25 mg capsule Take 25 mg by mouth At Night As Needed for Sleep.     • ezetimibe (ZETIA) 10 MG tablet Take 10 mg by mouth Daily.     • folic acid (FOLVITE) 1 MG tablet Take 1 mg by mouth Daily.     • gabapentin (NEURONTIN) 300 MG capsule Take 300 mg by mouth 2 (Two) Times a Day.     • LORazepam (ATIVAN) 0.5 MG tablet lorazepam 0.5 mg tablet   TAKE 1 TABLET BY MOUTH EVERY 8 HOURS AS NEEDED FOR ANXIETY FOR UP TO 30 DAYS.     • megestrol (MEGACE) 40 MG/ML suspension      • melatonin 5 MG tablet tablet Take 5 mg by mouth Every Night.     • metaxalone (SKELAXIN) 800 MG tablet Take 1 tablet by mouth 2 (Two) Times a Day As Needed  for muscle spasms. (Patient taking differently: Take 1 tablet by mouth 3 (Three) Times a Day As Needed for Muscle Spasms.) 60 tablet 0   • metoprolol tartrate (LOPRESSOR) 25 MG tablet TAKE 1 TABLET BY MOUTH TWICE A DAY  3   • montelukast (SINGULAIR) 10 MG tablet Take 10 mg by mouth Every Night.     • multivitamin with minerals tablet tablet Take 1 tablet by mouth Daily.     • nitroglycerin (NITROSTAT) 0.4 MG SL tablet PLACE 1 TAB UNDER TONGUE EVERY 5 MIN FOR UP TO 3 DOSES FOR CHEST PAIN **SEEK ER IF NO RELIEF**  5   • omeprazole (priLOSEC) 20 MG capsule Take 20 mg by mouth 2 (Two) Times a Day.     • ondansetron (ZOFRAN) 4 MG tablet Take 4 mg by mouth Daily With Lunch.     • Pembrolizumab (KEYTRUDA IV) Infuse 1 dose into a venous catheter. EVERY 21 DAYS FOR LUNG CANCER     • rosuvastatin (CRESTOR) 20 MG tablet Take 20 mg by mouth Daily.     • traMADol (ULTRAM) 50 MG tablet Take 1 tablet by mouth 3 (Three) Times a Day As Needed for moderate pain (4-6). (Patient taking differently: Take 1 tablet by mouth 3 (Three) Times a Day As Needed for Moderate Pain. Dr Dsouza  [place]) 90 tablet 0       Azithromycin, Penicillins, and Shellfish-derived products    Family History   Problem Relation Age of Onset   • Heart disease Mother    • No Known Problems Father    • Cancer Brother    • Prostate cancer Brother        Social History     Socioeconomic History   • Marital status:    Tobacco Use   • Smoking status: Former     Packs/day: 0.50     Types: Cigarettes     Quit date: 2022     Years since quittin.6   • Smokeless tobacco: Never   • Tobacco comments:     quit 2-3 months ago   Vaping Use   • Vaping Use: Never used   Substance and Sexual Activity   • Alcohol use: No   • Drug use: No   • Sexual activity: Defer       Review of Systems   Constitutional: Negative.    HENT: Positive for sore throat, trouble swallowing and voice change.    Eyes: Negative.    Respiratory: Positive for shortness of breath.     Cardiovascular: Negative.    Gastrointestinal: Negative.    Endocrine: Negative.    Genitourinary: Negative.    Musculoskeletal: Negative.    Skin: Negative.    Allergic/Immunologic: Negative.    Neurological: Negative.    Psychiatric/Behavioral: Negative.        Vitals:    10/11/22 1130   BP: 128/82   Pulse: 102   Temp: 97.1 °F (36.2 °C)       Body mass index is 22.15 kg/m².    Objective     Physical Exam  CONSTITUTIONAL: well nourished, well-developed, alert, oriented, in no acute distress     COMMUNICATION AND VOICE: able to communicate normally, normal voice quality    HEAD: normocephalic, no lesions, atraumatic, no tenderness, no masses     FACE: appearance normal, no lesions, no tenderness, no deformities, facial motion symmetric    EYES: ocular motility normal, eyelids normal, orbits normal, no proptosis, conjunctiva normal , pupils equal, round     EARS:  Hearing: hearing to conversational voice intact bilaterally   External Ears: normal bilaterally, no lesions    NOSE:  External Nose: external nasal structure normal, no tenderness on palpation, no nasal discharge, no lesions, no evidence of trauma, nostrils patent     ORAL:  Lips: upper and lower lips without lesion     LARYNX:  See endoscopy    NECK:  Inspection and Palpation: neck appearance normal, no masses or tenderness-well-healed incision    CHEST/RESPIRATORY: normal respiratory effort     CARDIOVASCULAR: no cyanosis or edema     NEUROLOGICAL/PSYCHIATRIC: oriented to time, place and person, mood normal, affect appropriate, CN II-XII intact grossly    Assessment & Plan   Diagnoses and all orders for this visit:    1. 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 Thyroid carcinoma (HCC)    3. Unilateral complete paralysis of vocal cord    4. Voice hoarseness    5. Pharyngoesophageal dysphagia      * Surgery not found *  No orders of the defined types were placed in this encounter.    Return for Recheck.       Patient Instructions    The risk benefits and options regarding right true vocal cord injection via MicroDirect laryngoscopy with Prolaryn gel was discussed with the patient and his wife    Will discuss with radiation oncology injection on October 26.    Be aware of the signs and symptoms of recurrent head and neck cancer including neck mass, persistent or recurrent sore throat, ear pain, hemoptysis, weight loss and hoarseness. If any of these symptoms recur and or persist, call for an evaluation.     D/W patient increasing caloric intake and discussed cruciferous vegetables and protein shakes etc to maintain optimal nutrition.  The necessity of abstaining from tobacco products was also discussed particularly with respect to not smoking.    Continue F/U and/or treatment with Macey

## 2022-10-11 NOTE — PROGRESS NOTES
OPERATIVE NOTE:  Chucky Jamison    DATE OF PROCEDURE: 10/11/2022    PROCEDURE:   Flexible Fiberoptic Laryngoscopy    ANESTHESIA:  None    REASON FOR PROCEDURE:  Procedure was recommended for persistant hoarseness  Risks, benefits and alternatives were discussed.      DETAILS of OPERATION:  The patient was seated in the exam chair.  A flexible fiberoptic laryngoscopy was performed through the oral cavity.  The scope was introduced into the oral cavity and directed to the level of the glottis, examining the structures of the oropharynx, base of tongue, vallecula, supraglottic larynx, glottic larynx, and hypopharynx.      FINDINGS:  Mucosal surfaces:   The mucosal surfaces demonstrated normal mucosa surfaces with mild inflammation    Base of tongue:  The base of tongue was found to have no mass or lesion.    Epiglottis:  The epiglottis was found to have no mass or lesion.    Aryepiglottic fold:  The AE folds were found to have no mass or lesion.    False Vocal Fold:  The false cords were found to have no mass or lesion.    True Vocal Cord:  The true vocal cords were found to have no mass or lesion.  Left true vocal cord adducts and abducts normally with a dense right true vocal cord paralysis with the cord in the paramedian position.  No residual bulk is appreciated to the right true vocal fold particularly posteriorly.     Arytenoid:   The arytenoids were found to have no mass or lesion.    Hypopharynx:  The hypopharynx was found to have no mass or lesion.    The patient tolerated procedure well.

## 2022-10-11 NOTE — PATIENT INSTRUCTIONS
The risk benefits and options regarding right true vocal cord injection via MicroDirect laryngoscopy with Prolaryn gel was discussed with the patient and his wife    Will discuss with radiation oncology injection on October 26.    Be aware of the signs and symptoms of recurrent head and neck cancer including neck mass, persistent or recurrent sore throat, ear pain, hemoptysis, weight loss and hoarseness. If any of these symptoms recur and or persist, call for an evaluation.     D/W patient increasing caloric intake and discussed cruciferous vegetables and protein shakes etc to maintain optimal nutrition.  The necessity of abstaining from tobacco products was also discussed particularly with respect to not smoking.    Continue F/U and/or treatment with Macey

## 2022-10-19 NOTE — DISCHARGE INSTRUCTIONS
Before you come to the hospital        Arrival time: AS DIRECTED BY OFFICE     YOU MAY TAKE THE FOLLOWING MEDICATION(S) THE MORNING OF SURGERY WITH A SIP OF WATER: METOPROLOL, ATIVAN, GABAPENTIN, TRAMADOL           ALL OTHER HOME MEDICATION CHECK WITH YOUR PHYSICIAN (especially if you are taking diabetes medicines or blood thinners)    Do not take any Erectile Dysfunction medications (EX: CIALIS, VIAGRA) 24 hours prior to surgery.      If you were given and instructed to use a germ- killing soap, use as directed the night before surgery and again the morning of surgery or as directed by your surgeon.    (See attached information for How to Use Chlorhexidine for Bathing if applicable.)            Eating and drinking restrictions prior to scheduled arrival time    2 Hours before arrival time STOP   Drinking Clear liquids (water, apple juice-no pulp)     6 Hours before arrival time STOP   Milk or drinks that contain milk, full liquids    6 Hours before arrival time STOP   Light meals or foods, such as toast or cereal    8 Hours before arrival time STOP   Heavy foods, such as meat, fried foods, or fatty foods    (It is extremely important that you follow these guidelines to prevent delay or cancelation of your procedure)     Clear Liquids  Water and flavored water                                                                      Clear Fruit juices, such as cranberry juice and apple juice.  Black coffee (NO cream of any kind, including powdered).  Plain tea  Clear bouillon or broth.  Flavored gelatin.  Soda.  Gatorade or Powerade.  Full liquid examples  Juices that have pulp.  Frozen ice pops that contain fruit pieces.  Coffee with creamer  Milk.  Yogurt.                MANAGING PAIN AFTER SURGERY    We know you are probably wondering what your pain will be like after surgery.  Following surgery it is unrealistic to expect you will not have pain.   Pain is how our bodies let us know that something is wrong or cautions  us to be careful.  That said, our goal is to make your pain tolerable.    Methods we may use to treat your pain include (oral or IV medications, PCAs, epidurals, nerve blocks, etc.)   While some procedures require IV pain medications for a short time after surgery, transitioning to pain medications by mouth allows for better management of pain.   Your nurse will encourage you to take oral pain medications whenever possible.  IV medications work almost immediately, but only last a short while.  Taking medications by mouth allows for a more constant level of medication in your blood stream for a longer period of time.      Once your pain is out of control it is harder to get back under control.  It is important you are aware when your next dose of pain medication is due.  If you are admitted, your nurse may write the time of your next dose on the white board in your room to help you remember.      We are interested in your pain and encourage you to inform us about aggravating factors during your visit.   Many times a simple repositioning every few hours can make a big difference.    If your physician says it is okay, do not let your pain prevent you from getting out of bed. Be sure to call your nurse for assistance prior to getting up so you do not fall.      Before surgery, please decide your tolerable pain goal.  These faces help describe the pain ratings we use on a 0-10 scale.   Be prepared to tell us your goal and whether or not you take pain or anxiety medications at home.          Preparing for Surgery  Preparing for surgery is an important part of your care. It can make things go more smoothly and help you avoid complications. The steps leading up to surgery may vary among hospitals. Follow all instructions given to you by your health care providers. Ask questions if you do not understand something. Talk about any concerns that you have.  Here are some questions to consider asking before your surgery:  If my  surgery is not an emergency (is elective), when would be the best time to have the surgery?  What arrangements do I need to make for work, home, or school?  What will my recovery be like? How long will it be before I can return to normal activities?  Will I need to prepare my home? Will I need to arrange care for me or my children?  Should I expect to have pain after surgery? What are my pain management options? Are there nonmedical options that I can try for pain?  Tell a health care provider about:  Any allergies you have.  All medicines you are taking, including vitamins, herbs, eye drops, creams, and over-the-counter medicines.  Any problems you or family members have had with anesthetic medicines.  Any blood disorders you have.  Any surgeries you have had.  Any medical conditions you have.  Whether you are pregnant or may be pregnant.  What are the risks?  The risks and complications of surgery depend on the specific procedure that you have. Discuss all the risks with your health care providers before your surgery. Ask about common surgical complications, which may include:  Infection.  Bleeding or a need for blood replacement (transfusion).  Allergic reactions to medicines.  Damage to surrounding nerves, tissues, or structures.  A blood clot.  Scarring.  Failure of the surgery to correct the problem.  Follow these instructions before the procedure:  Several days or weeks before your procedure  You may have a physical exam by your primary health care provider to make sure it is safe for you to have surgery.  You may have testing. This may include a chest X-ray, blood and urine tests, electrocardiogram (ECG), or other testing.  Ask your health care provider about:  Changing or stopping your regular medicines. This is especially important if you are taking diabetes medicines or blood thinners.  Taking medicines such as aspirin and ibuprofen. These medicines can thin your blood. Do not take these medicines unless  your health care provider tells you to take them.  Taking over-the-counter medicines, vitamins, herbs, and supplements.  Do not use any products that contain nicotine or tobacco, such as cigarettes and e-cigarettes. If you need help quitting, ask your health care provider.  Avoid alcohol.  Ask your health care provider if there are exercises you can do to prepare for surgery.  Eat a healthy diet.   Plan to have someone take you home from the hospital or clinic.  Plan to have a responsible adult care for you for at least 24 hours after you leave the hospital or clinic. This is important.  The day before your procedure  You may be given antibiotic medicine to take by mouth to help prevent infection. Take it as told by your health care provider.  You may be asked to shower with a germ-killing soap.  Follow instructions from your health care provider about eating and drinking restrictions. This includes gum, mints and hard candy.  Pack comfortable clothes according to your procedure.   The day of your procedure  You may need to take another shower with a germ-killing soap before you leave home in the morning.  With a small sip of water, take only the medicines that you are told to take.  Remove all jewelry including rings.   Leave anything you consider valuable at home except hearing aids if needed.  Do not wear any makeup, nail polish, powder, deodorant, lotion, hair accessories, or anything on your skin or body except your clothes.  If you will be staying in the hospital, bring a case to hold your glasses, contacts, or dentures. You may also want to bring your robe and non-skid footwear.  If you wear oxygen at home, bring it with you the day of surgery.  If instructed by your health care provider, bring your sleep apnea device with you on the day of your surgery (if this applies to you).  You may want to leave your suitcase and sleep apnea device in the car until after surgery.   Arrive at the hospital as  scheduled.  Bring a friend or family member with you who can help to answer questions and be present while you meet with your health care provider.  At the hospital  When you arrive at the hospital:  Go to registration located at the main entrance of the hospital. You will be registered and given a beeper and a sticker sheet. Take the stickers to the Outpatient nurses desk and place in the black tray. This is to notify staff that you have arrived. Then return to the lobby to wait.   When your beeper lights up and vibrates proceed through the double doors, under the stairs, and a member of the Outpatient Surgery staff will escort you to your preoperative room.  You may have to wear compression sleeves. These help to prevent blood clots and reduce swelling in your legs.  An IV may be inserted into one of your veins.              In the operating room, you may be given one or more of the following:        A medicine to help you relax (sedative).        A medicine to numb the area (local anesthetic).        A medicine to make you fall asleep (general anesthetic).        A medicine that is injected into an area of your body to numb everything below the                      injection site (regional anesthetic).  You may be given an antibiotic through your IV to help prevent infection.  Your surgical site will be marked or identified.    Contact a health care provider if you:  Develop a fever of more than 100.4°F (38°C) or other feelings of illness during the 48 hours before your surgery.  Have symptoms that get worse.  Have questions or concerns about your surgery.  Summary  Preparing for surgery can make the procedure go more smoothly and lower your risk of complications.  Before surgery, make a list of questions and concerns to discuss with your surgeon. Ask about the risks and possible complications.  In the days or weeks before your surgery, follow all instructions from your health care provider. You may need to stop  smoking, avoid alcohol, follow eating restrictions, and change or stop your regular medicines.  Contact your surgeon if you develop a fever or other signs of illness during the few days before your surgery.  This information is not intended to replace advice given to you by your health care provider. Make sure you discuss any questions you have with your health care provider.  Document Revised: 12/21/2018 Document Reviewed: 10/23/2018  Elsevier Patient Education © 2021 [employer]

## 2022-10-19 NOTE — TELEPHONE ENCOUNTER
Patient notified    ----- Message from KARLA Pascal sent at 10/19/2022  2:45 PM CDT -----  May need to clear with pcp due to abnormalities   Looks similar to previous

## 2022-10-26 PROBLEM — C34.90 METASTATIC LUNG CANCER (METASTASIS FROM LUNG TO OTHER SITE): Status: ACTIVE | Noted: 2021-01-01

## 2022-10-26 PROBLEM — C80.1 MALIGNANT NEOPLASM METASTATIC TO THORACIC VERTEBRAL COLUMN WITH UNKNOWN PRIMARY SITE (HCC): Status: ACTIVE | Noted: 2022-01-01

## 2022-10-26 PROBLEM — J43.9 EMPHYSEMA LUNG (HCC): Status: ACTIVE | Noted: 2022-01-01

## 2022-10-26 PROBLEM — I48.91 ATRIAL FIBRILLATION WITH RVR (HCC): Status: ACTIVE | Noted: 2022-01-01

## 2022-10-26 PROBLEM — C79.51 MALIGNANT NEOPLASM METASTATIC TO THORACIC VERTEBRAL COLUMN WITH UNKNOWN PRIMARY SITE (HCC): Status: ACTIVE | Noted: 2022-01-01

## 2022-10-26 NOTE — ANESTHESIA PREPROCEDURE EVALUATION
Anesthesia Evaluation     Patient summary reviewed   no history of anesthetic complications:  NPO Solid Status: > 8 hours             Airway   Mallampati: II  Dental    (+) upper dentures and lower dentures    Pulmonary    (+) a smoker Former, lung cancer, COPD,   (-) asthma, sleep apnea  Cardiovascular   Exercise tolerance: poor (<4 METS)    ECG reviewed  Patient on routine beta blocker    (+) hypertension, CAD, cardiac stents (2013) dysrhythmias Bradycardia, hyperlipidemia,   (-) pacemaker, past MI, angina      Neuro/Psych  (-) seizures, TIA, CVA  GI/Hepatic/Renal/Endo    (+)  GERD,  thyroid problem   (-) liver disease, no renal disease, diabetes    Musculoskeletal     Abdominal    Substance History      OB/GYN          Other   arthritis,    history of cancer                      Anesthesia Plan    ASA 3     general     (Tachycardic in holding; bradycardic when I last saw him; NSR on preop EKG; will get EKG in holding; may have to defer his procedure;      Remains tachy to 150's and SOA-->symptomatic, recommend eval in ER. Discussed with CRNA and Dr. Wlash; )  intravenous induction     Anesthetic plan, risks, benefits, and alternatives have been provided, discussed and informed consent has been obtained with: patient.

## 2022-10-27 NOTE — TELEPHONE ENCOUNTER
[facility] received a fax that requires your attention. The document has been indexed to the patient’s chart for your review.      Reason for sending: PLAN OF CARE NEEDS TO BE SIGNED AND FAXED BACK    Documents Description: EXT MED RECS_KEYSHA YOUSIF NP_10/25/22    Name of Sender: [facility], [place], operated by [facility]    Date Indexed: 10/25/22    Notes (if needed):

## 2022-10-28 NOTE — OUTREACH NOTE
Prep Survey    Flowsheet Row Responses   [facility] facility patient discharged from? [place]   Is LACE score < 7 ? No   Emergency Room discharge w/ pulse ox? No   Eligibility Readm Mgmt   Discharge diagnosis Atrial fibrillation with RVR   Does the patient have one of the following disease processes/diagnoses(primary or secondary)? Other   Does the patient have Home health ordered? No   Is there a DME ordered? No   Prep survey completed? Yes          SHANE MOLINA - Registered Nurse

## 2022-10-31 ENCOUNTER — HOSPITAL ENCOUNTER (OUTPATIENT)
Dept: INFUSION THERAPY | Age: 77
End: 2022-10-31

## 2022-11-09 ENCOUNTER — TELEPHONE (OUTPATIENT)
Dept: INFUSION THERAPY | Age: 77
End: 2022-11-09

## 2022-11-09 NOTE — TELEPHONE ENCOUNTER
Spoke with doctor Denise and he has agreed to take over patients pain medication. Patient is currently on 12.5mcg. Orders received for 25mcg per MD. When I called to speak to wife about pain medication she informed me that patient is currently admitted to [facility] in [facility] with sepsis. I informed patients wife to notify us when patient is discharged and is in need of pain patches so that we can send in the prescription. Wife verbalized understanding. MD notified that patient was admitted at this time and the wife will call when patient gets discharged.  Electronically signed by Mari Glynn RN on 11/9/2022 at 9:30 AM

## 2022-11-10 ENCOUNTER — READMISSION MANAGEMENT (OUTPATIENT)
Dept: CALL CENTER | Facility: HOSPITAL | Age: 77
End: 2022-11-10

## 2022-11-10 ENCOUNTER — TELEPHONE (OUTPATIENT)
Dept: OTOLARYNGOLOGY | Facility: CLINIC | Age: 77
End: 2022-11-10

## 2022-11-10 NOTE — OUTREACH NOTE
Medical Week 2 Survey    Flowsheet Row Responses   [facility] facility patient discharged from? [place]   Does the patient have one of the following disease processes/diagnoses(primary or secondary)? Other   Week 2 attempt successful? No   Unsuccessful attempts Attempt 1          CHOLO HUSTON - Registered Nurse

## 2022-11-10 NOTE — TELEPHONE ENCOUNTER
Please be informed that patient has passed. Patient has been marked  in the system. The date of death is: 24    Caller:     Relationship: WIFE    Best call back number: [phone]

## 2022-11-11 ENCOUNTER — READMISSION MANAGEMENT (OUTPATIENT)
Dept: CALL CENTER | Facility: HOSPITAL | Age: 77
End: 2022-11-11

## 2022-11-11 NOTE — OUTREACH NOTE
Medical Week 2 Survey    Flowsheet Row Responses   [facility], [place] patient discharged from? [place]   Does the patient have one of the following disease processes/diagnoses(primary or secondary)? Other   Week 2 attempt successful? No   Unsuccessful attempts Attempt 1   Revoke Patient           LINDA BOATENG - Registered Nurse

## (undated) DEVICE — CONTAINER,SPECIMEN,OR STERILE,4OZ: Brand: MEDLINE

## (undated) DEVICE — PK ENT HD AND NK 30

## (undated) DEVICE — TOWEL,OR,DSP,ST,BLUE,STD,4/PK,20PK/CS: Brand: MEDLINE

## (undated) DEVICE — PACK,SET UP,NO DRAPES: Brand: MEDLINE

## (undated) DEVICE — TUBING, SUCTION, 1/4" X 12', STRAIGHT: Brand: MEDLINE

## (undated) DEVICE — SPONGE,NEURO,0.5"X3",XR,STRL,LF,10/PK: Brand: MEDLINE

## (undated) DEVICE — SUT ETHLN 5/0 P3 18IN 698H

## (undated) DEVICE — Device

## (undated) DEVICE — KT ANTI FOG W/FLD AND SPNG

## (undated) DEVICE — GLV SURG BIOGEL M LTX PF 7 1/2

## (undated) DEVICE — SINGLE USE SUCTION VALVE MAJ-209: Brand: SINGLE USE SUCTION VALVE (STERILE)

## (undated) DEVICE — SUT MNCRYL 5/0 P3 18IN UD MCP493G

## (undated) DEVICE — ADAPTER TBNG DIA15MM SWVL FBROPT BRONCHSCP TERM 2 AXIS PEEP

## (undated) DEVICE — FORCEPS BX L100CM DIA1.8MM WRK CHN 2MM PULM S STL RAD JAW 4

## (undated) DEVICE — TUBE ET 8.5MM NSL ORAL BASIC CUF INTMED MURPHY EYE RADPQ

## (undated) DEVICE — SOLUTION IV IRRIG POUR BRL 0.9% SODIUM CHL 2F7124

## (undated) DEVICE — PK TURNOVER RM ADV

## (undated) DEVICE — SPNG GZ WOVN 4X4IN 12PLY 10/BX STRL

## (undated) DEVICE — SYR TB PRECISIONGLIDE 1CC 26G 3/8IN LF

## (undated) DEVICE — GAUZE,SPONGE,4"X4",16PLY,XRAY,STRL,LF: Brand: MEDLINE

## (undated) DEVICE — DISPOSABLE IRRIGATION BIPOLAR CORD, M1000 TYPE: Brand: KIRWAN

## (undated) DEVICE — SINGLE USE BIOPSY VALVE MAJ-210: Brand: SINGLE USE BIOPSY VALVE (STERILE)

## (undated) DEVICE — PROTECT TEETH A/

## (undated) DEVICE — AMBU AURA-I U SIZE 4, DISPOSABLE LARYNGEAL MASK: Brand: AURA-I

## (undated) DEVICE — ENDO KIT,LOURDES HOSPITAL: Brand: MEDLINE INDUSTRIES, INC.

## (undated) DEVICE — SUT MNCRYL 4/0 P3 18IN UD MCP494G

## (undated) DEVICE — RESERVOIR,SUCTION,100CC,SILICONE: Brand: MEDLINE

## (undated) DEVICE — YANKAUER,BULB TIP WITH VENT: Brand: ARGYLE

## (undated) DEVICE — BLADE MAC GRN LT DISPOSABLE

## (undated) DEVICE — ANESTHESIA CIRCUIT ADULT-LF: Brand: MEDLINE INDUSTRIES, INC.

## (undated) DEVICE — PENCL ES MEGADINE EZ/CLEAN BUTN W/HOLSTR 10FT

## (undated) DEVICE — SPNG DISSCT CHRRY 3/8IN STRL PK/5

## (undated) DEVICE — RETR STAY HK ELAS SHRP 5MM 50PK

## (undated) DEVICE — SUT SILK 2/0 FS BLK 18IN 685G